# Patient Record
Sex: MALE | Race: WHITE | NOT HISPANIC OR LATINO | Employment: OTHER | URBAN - NONMETROPOLITAN AREA
[De-identification: names, ages, dates, MRNs, and addresses within clinical notes are randomized per-mention and may not be internally consistent; named-entity substitution may affect disease eponyms.]

---

## 2017-01-09 ENCOUNTER — OFFICE VISIT (OUTPATIENT)
Dept: FAMILY MEDICINE CLINIC | Facility: CLINIC | Age: 77
End: 2017-01-09

## 2017-01-09 VITALS
RESPIRATION RATE: 12 BRPM | WEIGHT: 179 LBS | DIASTOLIC BLOOD PRESSURE: 80 MMHG | TEMPERATURE: 98.8 F | SYSTOLIC BLOOD PRESSURE: 130 MMHG | HEIGHT: 63 IN | BODY MASS INDEX: 31.71 KG/M2 | OXYGEN SATURATION: 92 % | HEART RATE: 52 BPM

## 2017-01-09 DIAGNOSIS — E11.42 TYPE 2 DIABETES MELLITUS WITH PERIPHERAL NEUROPATHY (HCC): ICD-10-CM

## 2017-01-09 DIAGNOSIS — Z23 ENCOUNTER FOR IMMUNIZATION: ICD-10-CM

## 2017-01-09 DIAGNOSIS — E78.49 OTHER HYPERLIPIDEMIA: ICD-10-CM

## 2017-01-09 DIAGNOSIS — E53.8 VITAMIN B 12 DEFICIENCY: ICD-10-CM

## 2017-01-09 DIAGNOSIS — N52.01 ERECTILE DYSFUNCTION DUE TO ARTERIAL INSUFFICIENCY: ICD-10-CM

## 2017-01-09 DIAGNOSIS — J44.9 CHRONIC OBSTRUCTIVE PULMONARY DISEASE, UNSPECIFIED COPD TYPE (HCC): ICD-10-CM

## 2017-01-09 DIAGNOSIS — N40.1 BENIGN NON-NODULAR PROSTATIC HYPERPLASIA WITH LOWER URINARY TRACT SYMPTOMS: ICD-10-CM

## 2017-01-09 DIAGNOSIS — E78.2 MIXED HYPERLIPIDEMIA: ICD-10-CM

## 2017-01-09 DIAGNOSIS — K70.0 FATTY LIVER, ALCOHOLIC: ICD-10-CM

## 2017-01-09 DIAGNOSIS — F51.04 CHRONIC INSOMNIA: ICD-10-CM

## 2017-01-09 DIAGNOSIS — I10 ESSENTIAL HYPERTENSION: Primary | ICD-10-CM

## 2017-01-09 DIAGNOSIS — J30.9 CHRONIC ALLERGIC RHINITIS: ICD-10-CM

## 2017-01-09 DIAGNOSIS — Z87.891 FORMER SMOKER: ICD-10-CM

## 2017-01-09 DIAGNOSIS — Z00.00 HEALTHCARE MAINTENANCE: ICD-10-CM

## 2017-01-09 PROCEDURE — G0009 ADMIN PNEUMOCOCCAL VACCINE: HCPCS | Performed by: GENERAL PRACTICE

## 2017-01-09 PROCEDURE — 90670 PCV13 VACCINE IM: CPT | Performed by: GENERAL PRACTICE

## 2017-01-09 PROCEDURE — 99214 OFFICE O/P EST MOD 30 MIN: CPT | Performed by: GENERAL PRACTICE

## 2017-01-09 RX ORDER — HYDROXYZINE PAMOATE 50 MG/1
CAPSULE ORAL
Qty: 120 CAPSULE | Refills: 5 | Status: SHIPPED | OUTPATIENT
Start: 2017-01-09 | End: 2018-03-03 | Stop reason: SDUPTHER

## 2017-01-09 RX ORDER — LISINOPRIL 10 MG/1
10 TABLET ORAL DAILY
Qty: 30 TABLET | Refills: 5 | Status: SHIPPED | OUTPATIENT
Start: 2017-01-09 | End: 2017-12-28 | Stop reason: SDUPTHER

## 2017-01-09 RX ORDER — PRAVASTATIN SODIUM 40 MG
40 TABLET ORAL DAILY
Qty: 30 TABLET | Refills: 5 | Status: SHIPPED | OUTPATIENT
Start: 2017-01-09 | End: 2017-08-27 | Stop reason: SDUPTHER

## 2017-01-09 RX ORDER — LORAZEPAM 1 MG/1
1 TABLET ORAL NIGHTLY PRN
Qty: 30 TABLET | Refills: 3 | Status: SHIPPED | OUTPATIENT
Start: 2017-01-09 | End: 2017-04-11 | Stop reason: SDUPTHER

## 2017-01-09 RX ORDER — TAMSULOSIN HYDROCHLORIDE 0.4 MG/1
1 CAPSULE ORAL NIGHTLY
Qty: 30 CAPSULE | Refills: 5 | Status: SHIPPED | OUTPATIENT
Start: 2017-01-09 | End: 2017-12-28 | Stop reason: SDUPTHER

## 2017-01-09 RX ORDER — METFORMIN HYDROCHLORIDE 500 MG/1
500 TABLET, EXTENDED RELEASE ORAL
Qty: 30 TABLET | Refills: 5 | Status: SHIPPED | OUTPATIENT
Start: 2017-01-09 | End: 2017-07-27 | Stop reason: SDUPTHER

## 2017-01-09 RX ORDER — GABAPENTIN 600 MG/1
600 TABLET ORAL NIGHTLY
Qty: 30 TABLET | Refills: 5 | Status: SHIPPED | OUTPATIENT
Start: 2017-01-09 | End: 2017-07-27 | Stop reason: SDUPTHER

## 2017-01-09 NOTE — MR AVS SNAPSHOT
Joselito Merida   1/9/2017 1:00 PM   Office Visit    Dept Phone:  [phone]   Encounter #:  [id number]    Provider:  Markie Medrano MD   Department:  [facility]                Your Full Care Plan              Where to Get Your Medications      [address] [place], [place] [address] - [phone]  - [phone] [facility] [address]     Phone:  [phone]     cyanocobalamin 1000 MCG tablet    gabapentin 600 MG tablet    hydrOXYzine 50 MG capsule    lisinopril 10 MG tablet    metFORMIN  MG 24 hr tablet    pravastatin 40 MG tablet    RA ALLERGY RELIEF 10 MG tablet    tamsulosin 0.4 MG capsule 24 hr capsule         You can get these medications from any pharmacy     Bring a paper prescription for each of these medications     LORazepam 1 MG tablet            Your Updated Medication List          This list is accurate as of: 1/9/17  2:01 PM.  Always use your most recent med list.                aspirin 81 MG tablet       cyanocobalamin 1000 MCG tablet   Commonly known as:  VITAMIN B-12   Take 1 tablet by mouth Daily.       gabapentin 600 MG tablet   Commonly known as:  NEURONTIN   Take 1 tablet by mouth Every Night.       hydrOXYzine 50 MG capsule   Commonly known as:  VISTARIL   1 po bid, and 2 po qhs       lisinopril 10 MG tablet   Commonly known as:  PRINIVIL,ZESTRIL   Take 1 tablet by mouth Daily.       LORazepam 1 MG tablet   Commonly known as:  ATIVAN   Take 1 tablet by mouth At Night As Needed for anxiety.       metFORMIN  MG 24 hr tablet   Commonly known as:  GLUCOPHAGE-XR   Take 1 tablet by mouth Daily With Breakfast.       pravastatin 40 MG tablet   Commonly known as:  PRAVACHOL   Take 1 tablet by mouth Daily.       RA ALLERGY RELIEF 10 MG tablet   Generic drug:  cetirizine   Take 1 tablet by mouth Daily.       tamsulosin 0.4 MG capsule 24 hr  capsule   Commonly known as:  FLOMAX   Take 1 capsule by mouth Every Night.               We Performed the Following     Pneumococcal Conjugate Vaccine 13-Valent All       You Were Diagnosed With        Codes Comments    Essential hypertension    -  Primary ICD-10-CM: I10  ICD-9-CM: 401.9     Chronic obstructive pulmonary disease, unspecified COPD type     ICD-10-CM: J44.9  ICD-9-CM: 496     Chronic allergic rhinitis     ICD-10-CM: J30.9  ICD-9-CM: 477.9     Vitamin B 12 deficiency     ICD-10-CM: E53.8  ICD-9-CM: 266.2     Fatty liver, alcoholic     ICD-10-CM: K70.0  ICD-9-CM: 571.0     Type 2 diabetes mellitus with peripheral neuropathy     ICD-10-CM: E11.42  ICD-9-CM: 250.60, 357.2     Benign non-nodular prostatic hyperplasia with lower urinary tract symptoms     ICD-10-CM: N40.1  ICD-9-CM: 600.91     Chronic insomnia     ICD-10-CM: F51.04  ICD-9-CM: 780.52     Other hyperlipidemia     ICD-10-CM: E78.4  ICD-9-CM: 272.4     Mixed hyperlipidemia     ICD-10-CM: E78.2  ICD-9-CM: 272.2     Erectile dysfunction due to arterial insufficiency     ICD-10-CM: N52.01  ICD-9-CM: 607.84     Encounter for immunization     ICD-10-CM: Z23  ICD-9-CM: V03.89       Instructions     None    Patient Instructions History      Upcoming Appointments     Visit Type Date Time Department    OFFICE VISIT 1/9/2017  1:00 PM [facility]    LAB 3/24/2017  9:30 AM [facility]    OFFICE VISIT 4/11/2017  1:30 PM [facility]      MyChart Signup     Our records indicate that you have declined [facility] [employer] signup. If you would like to sign up for [employer], please email [email or web address] or call [phone] to obtain an activation code.             Other Info from Your Visit           Your Appointments     Mar 24, 2017  9:30 AM EDT   Lab with LAB [facility] (--)    [address] [place] [address]   [phone]            Apr 11, 2017  1:30 PM EDT  "  Office Visit with Markie Medrano MD   [facility] (--)    [address] [facility] [address] [phone]           Please arrive 10 minutes early. Bring a complete list of all medications and bring any previous records or diagnostic testing results.              Allergies     No Known Drug Allergy        Vital Signs     Pulse Temperature Height Weight Oxygen Saturation Body Mass Index    52 98.8 °F (37.1 °C) (Tympanic) 63\" (160 cm) 179 lb (81.2 kg) 92% 31.71 kg/m2    Smoking Status                   Never Smoker           Problems and Diagnoses Noted     Benign non-nodular prostatic hyperplasia with lower urinary tract symptoms    Chronic allergic rhinitis    Chronic insomnia    Chronic airway obstruction    Encounter for immunization    High blood pressure    Fatty liver, alcoholic    Mixed hyperlipidemia    Type 2 diabetes mellitus with peripheral neuropathy    Vasculogenic erectile dysfunction    Vitamin B12 deficiency    Other hyperlipidemia          No Longer an Issue     Excess wax in ear        "

## 2017-01-10 NOTE — PROGRESS NOTES
Subjective   Joselito Merida is a 76 y.o. male.     History of Present Illness     COPD  The patient reports that the SOB and cough has been gradually decreasing in frequency. He states that the symptoms occur during the day. He reports that his symptoms become worse with activity and exposure to cold air. His symptoms are reduced with rest and with inhaled inhaled medication. The patient states he also has nasal congestion and post nasal drip/clearing throat. He is following the treatment plan, including medications as directed. He has a history of 40 pack years.    Diabetes  Current symptoms include paresthesia of the feet. Patient denies visual disturbances, polydipsia, polyuria, hypoglycemia and foot ulcerations. Evaluation to date has been: hemoglobin A1C. Home sugars: patient does not check sugars. Current treatments: metformin. Last dilated eye exam within one year. Most recent hemoglobin A1c   Lab Results   Component Value Date    HGBA1C 6.90 (H) 09/28/2016    HGBA1C 7.6 (H) 04/14/2016      Dyslipidemia  Compliance with treatment has been fair. The patient exercises intermittently. He is currently being prescribed the following medication for his dyslipidemia - Pravachol (pravastatin). Patient denies side effects associated with his medications. Most recent lipids include  Lab Results   Component Value Date    TRIG 201 (H) 09/28/2016    TRIG 114 04/14/2016    HDL 52 (L) 09/28/2016    HDL 48 (L) 04/14/2016    LDLCALC 90 09/28/2016    LDL 63 04/14/2016     Hypertension  Home blood pressure readings: not doing. Associated signs and symptoms: dyspnea. Patient denies: chest pain, palpitations, orthopnea, paroxysmal nocturnal dyspnea and peripheral edema. Current antihypertensive medications includes lisinopril. Medication compliance: taking as prescribed. Most recent creatinine   Lab Results   Component Value Date    CREATININE 1.09 09/28/2016    CREATININE 1.10 04/14/2016     Labs  Most recent B12 301.  Patient  states he was not taking his B12 supplementation consistently at that time but has done so over the last few months    The following portions of the patient's history were reviewed and updated as appropriate: allergies, current medications, past medical history, past social history and problem list.    Review of Systems   Constitutional: Negative for appetite change, chills, fatigue, fever and unexpected weight change.   HENT: Positive for rhinorrhea and sneezing. Negative for congestion, ear pain, postnasal drip, sinus pressure, sore throat and voice change.    Eyes: Negative for visual disturbance.   Respiratory: Positive for cough and shortness of breath. Negative for wheezing.    Cardiovascular: Negative for chest pain, palpitations and leg swelling.   Gastrointestinal: Negative for abdominal pain, blood in stool, constipation, diarrhea, nausea and vomiting.   Endocrine: Negative for polydipsia and polyuria.   Genitourinary: Negative for difficulty urinating, dysuria, frequency, hematuria and urgency.   Musculoskeletal: Negative for arthralgias, back pain, joint swelling, myalgias and neck pain.   Skin: Negative for color change.   Neurological: Positive for numbness. Negative for tremors, weakness and headaches.   Psychiatric/Behavioral: Positive for sleep disturbance. Negative for dysphoric mood and suicidal ideas. The patient is not nervous/anxious.      Objective   Physical Exam   Constitutional: He is oriented to person, place, and time. He appears well-developed and well-nourished. He is cooperative. He does not have a sickly appearance. No distress.   Well in general.  No apparent distress.  No pallor, cyanosis, diaphoresis, or jaundice   HENT:   Head: Atraumatic.   Mouth/Throat: Oropharynx is clear and moist.   Eyes: EOM are normal. Pupils are equal, round, and reactive to light. No scleral icterus.   Neck: No JVD present. Carotid bruit is not present. No tracheal deviation present. No thyromegaly  present.   Cardiovascular: Normal rate, regular rhythm, S1 normal, S2 normal, normal heart sounds and intact distal pulses.  Exam reveals no gallop.    No murmur heard.  Pulmonary/Chest: No accessory muscle usage. No respiratory distress. He has decreased breath sounds. He has wheezes. He has no rales.   Pulmonary hyperinflation   Abdominal: Soft. Normal aorta and bowel sounds are normal. He exhibits no abdominal bruit and no mass. There is no hepatosplenomegaly. There is no tenderness. No hernia.   Musculoskeletal: He exhibits no tenderness or deformity.   Lymphadenopathy:        Head (right side): No submandibular adenopathy present.        Head (left side): No submandibular adenopathy present.     He has no cervical adenopathy.   Neurological: He is alert and oriented to person, place, and time. He has normal strength and normal reflexes. He displays normal reflexes. A sensory deficit (decreased vibration sense both feet) is present. No cranial nerve deficit. He exhibits normal muscle tone. Coordination normal.   Skin: Skin is warm and dry. No rash noted. He is not diaphoretic. No pallor. Nails show no clubbing.   Psychiatric: He has a normal mood and affect. His behavior is normal.     Assessment/Plan   Problems Addressed this Visit        Cardiovascular and Mediastinum    Essential hypertension - Primary  Hypertension: at goal. Evidence of target organ damage: none.  Encouraged to continue to work on diet and exercise plan.   Continue current medication  Fasting labs will be updated in several months     Relevant Medications    lisinopril (PRINIVIL,ZESTRIL) 10 MG tablet    Other Relevant Orders    CBC & Differential    Comprehensive Metabolic Panel       Respiratory    COPD (chronic obstructive pulmonary disease)  COPD is improving with smoking cessation.  Encouraged to remain off cigarettes  Encouraged to remain as active as symptoms allow for    Relevant Medications    RA ALLERGY RELIEF 10 MG tablet     Chronic allergic rhinitis    Relevant Medications    hydrOXYzine (VISTARIL) 50 MG capsule    RA ALLERGY RELIEF 10 MG tablet       Digestive    Vitamin B 12 deficiency  Encouraged to continue oral supplementation.  We will monitor     Relevant Medications    cyanocobalamin (VITAMIN B-12) 1000 MCG tablet    Other Relevant Orders    Vitamin B12    Fatty liver, alcoholic  Encouraged remain off alcohol        Endocrine    Type 2 diabetes mellitus with peripheral neuropathy  Diabetes mellitus Type II, under excellent control.   Encouraged to continue to pursue ADA diet  Encouraged aerobic exercise.  Reminded of the dangers of metformin if he should resume alcohol  We'll continue to monitor     Relevant Medications    gabapentin (NEURONTIN) 600 MG tablet    metFORMIN XR (GLUCOPHAGE-XR) 500 MG 24 hr tablet    Other Relevant Orders    Hemoglobin A1c    MicroAlbumin, Urine, Random       Genitourinary    Benign non-nodular prostatic hyperplasia with lower urinary tract symptoms    Relevant Medications    tamsulosin (FLOMAX) 0.4 MG capsule 24 hr capsule       Other    Mixed hyperlipidemia  If next LDL is not close to 70 we'll consider replacing pravastatin with a more potent HMG COA inhibitor     Relevant Medications    pravastatin (PRAVACHOL) 40 MG tablet    Other Relevant Orders    Lipid Panel    TSH    Chronic insomnia  Reminded her good sleep habits.  We'll continue current medication     Relevant Medications    LORazepam (ATIVAN) 1 MG tablet    hydrOXYzine (VISTARIL) 50 MG capsule    Vasculogenic erectile dysfunction    Healthcare maintenance  Lung cancer screening discussed briefly however patient quit smoking more than 20 years ago and is not inclined toward this     Encounter for immunization  Recommended a Prevnar     Relevant Orders    Pneumococcal Conjugate Vaccine 13-Valent All (Completed)    Former smoker

## 2017-02-20 ENCOUNTER — OFFICE VISIT (OUTPATIENT)
Dept: FAMILY MEDICINE CLINIC | Facility: CLINIC | Age: 77
End: 2017-02-20

## 2017-02-20 VITALS
OXYGEN SATURATION: 90 % | TEMPERATURE: 98.8 F | HEART RATE: 54 BPM | WEIGHT: 179 LBS | DIASTOLIC BLOOD PRESSURE: 80 MMHG | BODY MASS INDEX: 31.71 KG/M2 | SYSTOLIC BLOOD PRESSURE: 140 MMHG | HEIGHT: 63 IN

## 2017-02-20 DIAGNOSIS — H61.23 BILATERAL IMPACTED CERUMEN: Primary | ICD-10-CM

## 2017-02-20 PROCEDURE — 69210 REMOVE IMPACTED EAR WAX UNI: CPT | Performed by: NURSE PRACTITIONER

## 2017-02-20 PROCEDURE — 99213 OFFICE O/P EST LOW 20 MIN: CPT | Performed by: NURSE PRACTITIONER

## 2017-02-20 NOTE — PROGRESS NOTES
"Guanakito Merida is a 76 y.o. male.     Chief Complaint   Patient presents with   • Ears Cleaned out       History of Present Illness     Ear complaints-excessive wax.  Reports he has them washed out frequently.  He denies any changes in his hearing more than his usual.  He denies any ear pain.      The following portions of the patient's history were reviewed and updated as appropriate: allergies, current medications, past family history, past medical history, past social history, past surgical history and problem list.    Review of Systems   Constitutional: Negative for appetite change, chills, fatigue, fever and unexpected weight change.   HENT: Positive for rhinorrhea and sneezing. Negative for congestion, ear pain, postnasal drip, sinus pressure, sore throat and voice change.    Eyes: Negative for visual disturbance.   Respiratory: Positive for cough and shortness of breath. Negative for wheezing.    Cardiovascular: Negative for chest pain, palpitations and leg swelling.   Gastrointestinal: Negative for abdominal pain, blood in stool, constipation, diarrhea, nausea and vomiting.   Endocrine: Negative for polydipsia and polyuria.   Genitourinary: Negative for difficulty urinating, dysuria, frequency, hematuria and urgency.   Musculoskeletal: Negative for arthralgias, back pain, joint swelling, myalgias and neck pain.   Skin: Negative for color change.   Neurological: Positive for numbness. Negative for tremors, weakness and headaches.   Psychiatric/Behavioral: Positive for sleep disturbance. Negative for dysphoric mood and suicidal ideas. The patient is not nervous/anxious.    All other systems reviewed and are negative.      Objective     Visit Vitals   • /80 (BP Location: Left arm, Patient Position: Sitting)   • Pulse 54   • Temp 98.8 °F (37.1 °C) (Tympanic)   • Ht 63\" (160 cm)   • Wt 179 lb (81.2 kg)   • SpO2 90%   • BMI 31.71 kg/m2       Physical Exam   Constitutional: He is oriented to person, " place, and time. He appears well-developed and well-nourished. He is cooperative. He does not have a sickly appearance. No distress.   Well in general.  No apparent distress.  No pallor, cyanosis, diaphoresis, or jaundice   HENT:   Head: Atraumatic.   Right Ear: Decreased hearing is noted.   Left Ear: Decreased hearing is noted.   Mouth/Throat: Oropharynx is clear and moist.   Bilateral Cerumen impaction.  Left ear cleared with copious amounts of warm water and flexi-loop.  LEft TM dull, retracted.  Right ear flushed with copious amounts of warm water.  Small amount removed with flexi-loop but unable to clear EAC.  Patient tolerated well.     Eyes: EOM are normal. Pupils are equal, round, and reactive to light. No scleral icterus.   Neck: No JVD present. Carotid bruit is not present. No tracheal deviation present. No thyromegaly present.   Cardiovascular: Normal rate, regular rhythm, S1 normal, S2 normal, normal heart sounds and intact distal pulses.  Exam reveals no gallop.    No murmur heard.  Pulmonary/Chest: No accessory muscle usage. No respiratory distress. He has decreased breath sounds. He has no wheezes. He has no rales.   Pulmonary hyperinflation   Abdominal: Soft. Normal aorta and bowel sounds are normal. He exhibits no abdominal bruit and no mass. There is no hepatosplenomegaly. There is no tenderness. No hernia.   Musculoskeletal: He exhibits no tenderness or deformity.   Lymphadenopathy:        Head (right side): No submandibular adenopathy present.        Head (left side): No submandibular adenopathy present.     He has no cervical adenopathy.   Neurological: He is alert and oriented to person, place, and time. He has normal strength and normal reflexes. A sensory deficit (decreased vibration sense both feet) is present. No cranial nerve deficit. Coordination normal.   Skin: Skin is warm and dry. No rash noted. He is not diaphoretic. No pallor. Nails show no clubbing.   Psychiatric: He has a normal  mood and affect. His behavior is normal.   Vitals reviewed.      Assessment/Plan     Joselito was seen today for ears cleaned out.    Diagnoses and all orders for this visit:    Bilateral impacted cerumen    Advised to have ears cleaned more frequently.    Do not stick objects into ears.   May use sweet oil drops 3-4 days prior to planned appt to soften wax.   Understands disease processes and need for medications.  Understands reasons for urgent and emergent care.  Patient (& family) verbalized agreement for treatment plan.   RTC PRN 3-5 days for worsening or non resolving symptoms  Patient declines to make an appt today for return back to the office.

## 2017-03-24 ENCOUNTER — LAB (OUTPATIENT)
Dept: FAMILY MEDICINE CLINIC | Facility: CLINIC | Age: 77
End: 2017-03-24

## 2017-03-24 DIAGNOSIS — E78.2 MIXED HYPERLIPIDEMIA: ICD-10-CM

## 2017-03-24 DIAGNOSIS — I10 ESSENTIAL HYPERTENSION: ICD-10-CM

## 2017-03-24 DIAGNOSIS — E11.42 TYPE 2 DIABETES MELLITUS WITH PERIPHERAL NEUROPATHY (HCC): ICD-10-CM

## 2017-03-24 DIAGNOSIS — E53.8 VITAMIN B 12 DEFICIENCY: ICD-10-CM

## 2017-03-24 LAB
ALBUMIN SERPL-MCNC: 4.3 G/DL (ref 3.4–4.8)
ALBUMIN UR-MCNC: 76.3 MG/L
ALBUMIN/GLOB SERPL: 1.4 G/DL (ref 1.5–2.5)
ALP SERPL-CCNC: 38 U/L (ref 40–129)
ALT SERPL W P-5'-P-CCNC: 50 U/L (ref 10–44)
ANION GAP SERPL CALCULATED.3IONS-SCNC: 5.4 MMOL/L (ref 3.6–11.2)
AST SERPL-CCNC: 43 U/L (ref 10–34)
BASOPHILS # BLD AUTO: 0.03 10*3/MM3 (ref 0–0.3)
BASOPHILS NFR BLD AUTO: 0.5 % (ref 0–2)
BILIRUB SERPL-MCNC: 0.8 MG/DL (ref 0.2–1.8)
BUN BLD-MCNC: 20 MG/DL (ref 7–21)
BUN/CREAT SERPL: 19.4 (ref 7–25)
CALCIUM SPEC-SCNC: 9.7 MG/DL (ref 7.7–10)
CHLORIDE SERPL-SCNC: 105 MMOL/L (ref 99–112)
CHOLEST SERPL-MCNC: 156 MG/DL (ref 0–200)
CO2 SERPL-SCNC: 28.6 MMOL/L (ref 24.3–31.9)
CREAT BLD-MCNC: 1.03 MG/DL (ref 0.43–1.29)
DEPRECATED RDW RBC AUTO: 42.2 FL (ref 37–54)
EOSINOPHIL # BLD AUTO: 0.37 10*3/MM3 (ref 0–0.7)
EOSINOPHIL NFR BLD AUTO: 5.6 % (ref 0–7)
ERYTHROCYTE [DISTWIDTH] IN BLOOD BY AUTOMATED COUNT: 13.7 % (ref 11.5–14.5)
GFR SERPL CREATININE-BSD FRML MDRD: 71 ML/MIN/1.73
GLOBULIN UR ELPH-MCNC: 3.1 GM/DL
GLUCOSE BLD-MCNC: 146 MG/DL (ref 70–110)
HBA1C MFR BLD: 7.2 % (ref 4.5–5.7)
HCT VFR BLD AUTO: 46.8 % (ref 42–52)
HDLC SERPL-MCNC: 52 MG/DL (ref 60–100)
HGB BLD-MCNC: 15.7 G/DL (ref 14–18)
IMM GRANULOCYTES # BLD: 0.02 10*3/MM3 (ref 0–0.03)
IMM GRANULOCYTES NFR BLD: 0.3 % (ref 0–0.5)
LDLC SERPL CALC-MCNC: 64 MG/DL (ref 0–100)
LDLC/HDLC SERPL: 1.22 {RATIO}
LYMPHOCYTES # BLD AUTO: 2.24 10*3/MM3 (ref 1–3)
LYMPHOCYTES NFR BLD AUTO: 33.7 % (ref 16–46)
MCH RBC QN AUTO: 28.3 PG (ref 27–33)
MCHC RBC AUTO-ENTMCNC: 33.5 G/DL (ref 33–37)
MCV RBC AUTO: 84.5 FL (ref 80–94)
MONOCYTES # BLD AUTO: 0.57 10*3/MM3 (ref 0.1–0.9)
MONOCYTES NFR BLD AUTO: 8.6 % (ref 0–12)
NEUTROPHILS # BLD AUTO: 3.42 10*3/MM3 (ref 1.4–6.5)
NEUTROPHILS NFR BLD AUTO: 51.3 % (ref 40–75)
OSMOLALITY SERPL CALC.SUM OF ELEC: 282.8 MOSM/KG (ref 273–305)
PLATELET # BLD AUTO: 183 10*3/MM3 (ref 130–400)
PMV BLD AUTO: 10.2 FL (ref 6–10)
POTASSIUM BLD-SCNC: 4.4 MMOL/L (ref 3.5–5.3)
PROT SERPL-MCNC: 7.4 G/DL (ref 6–8)
RBC # BLD AUTO: 5.54 10*6/MM3 (ref 4.7–6.1)
SODIUM BLD-SCNC: 139 MMOL/L (ref 135–153)
TRIGL SERPL-MCNC: 202 MG/DL (ref 0–150)
TSH SERPL DL<=0.05 MIU/L-ACNC: 3.05 MIU/ML (ref 0.55–4.78)
VIT B12 BLD-MCNC: 631 PG/ML (ref 211–911)
VLDLC SERPL-MCNC: 40.4 MG/DL
WBC NRBC COR # BLD: 6.65 10*3/MM3 (ref 4.5–12.5)

## 2017-03-24 PROCEDURE — 83036 HEMOGLOBIN GLYCOSYLATED A1C: CPT | Performed by: GENERAL PRACTICE

## 2017-03-24 PROCEDURE — 80053 COMPREHEN METABOLIC PANEL: CPT | Performed by: GENERAL PRACTICE

## 2017-03-24 PROCEDURE — 80061 LIPID PANEL: CPT | Performed by: GENERAL PRACTICE

## 2017-03-24 PROCEDURE — 36415 COLL VENOUS BLD VENIPUNCTURE: CPT

## 2017-03-24 PROCEDURE — 85025 COMPLETE CBC W/AUTO DIFF WBC: CPT | Performed by: GENERAL PRACTICE

## 2017-03-24 PROCEDURE — 82607 VITAMIN B-12: CPT | Performed by: GENERAL PRACTICE

## 2017-03-24 PROCEDURE — 84443 ASSAY THYROID STIM HORMONE: CPT | Performed by: GENERAL PRACTICE

## 2017-03-24 PROCEDURE — 82043 UR ALBUMIN QUANTITATIVE: CPT | Performed by: GENERAL PRACTICE

## 2017-04-11 ENCOUNTER — OFFICE VISIT (OUTPATIENT)
Dept: FAMILY MEDICINE CLINIC | Facility: CLINIC | Age: 77
End: 2017-04-11

## 2017-04-11 DIAGNOSIS — Z87.891 FORMER SMOKER: ICD-10-CM

## 2017-04-11 DIAGNOSIS — E78.2 MIXED HYPERLIPIDEMIA: ICD-10-CM

## 2017-04-11 DIAGNOSIS — E11.42 TYPE 2 DIABETES MELLITUS WITH PERIPHERAL NEUROPATHY (HCC): ICD-10-CM

## 2017-04-11 DIAGNOSIS — E53.8 VITAMIN B 12 DEFICIENCY: ICD-10-CM

## 2017-04-11 DIAGNOSIS — K70.0 FATTY LIVER, ALCOHOLIC: ICD-10-CM

## 2017-04-11 DIAGNOSIS — I10 ESSENTIAL HYPERTENSION: Primary | ICD-10-CM

## 2017-04-11 DIAGNOSIS — J30.9 CHRONIC ALLERGIC RHINITIS: ICD-10-CM

## 2017-04-11 DIAGNOSIS — N40.1 BENIGN NON-NODULAR PROSTATIC HYPERPLASIA WITH LOWER URINARY TRACT SYMPTOMS: ICD-10-CM

## 2017-04-11 DIAGNOSIS — F51.04 CHRONIC INSOMNIA: ICD-10-CM

## 2017-04-11 DIAGNOSIS — N52.01 ERECTILE DYSFUNCTION DUE TO ARTERIAL INSUFFICIENCY: ICD-10-CM

## 2017-04-11 DIAGNOSIS — J44.9 CHRONIC OBSTRUCTIVE PULMONARY DISEASE, UNSPECIFIED COPD TYPE (HCC): ICD-10-CM

## 2017-04-11 PROCEDURE — 99214 OFFICE O/P EST MOD 30 MIN: CPT | Performed by: GENERAL PRACTICE

## 2017-04-11 RX ORDER — LORAZEPAM 1 MG/1
1 TABLET ORAL NIGHTLY PRN
Qty: 30 TABLET | Refills: 3 | Status: SHIPPED | OUTPATIENT
Start: 2017-04-11 | End: 2017-08-11 | Stop reason: SDUPTHER

## 2017-04-11 NOTE — PROGRESS NOTES
Subjective   Joselito Merida is a 76 y.o. male.     History of Present Illness     Allergic Rhinitis  Patient has a history of allergic rhinitis. Patient's symptoms include sneezing, clear rhinorrhea and postnasal drip. These symptoms are perennial with seasonal exacerbation. The patient has been suffering from these symptoms for a number of years . The patient has tried prescription antihistamine - loratadine with good relief of symptoms.      COPD  The patient reports that his SOB and cough have been gradually decreasing in frequency. He states that the symptoms occur during the day. He reports that his symptoms become worse with activity and exposure to cold air. His symptoms are reduced with rest and with inhaled inhaled medication. The patient states he also has nasal congestion and post nasal drip/clearing throat. He is following the treatment plan, including medications as directed. He has a history of 40 pack years.    Diabetes  Current symptoms include paresthesia of the feet. Patient denies visual disturbances, polydipsia, polyuria, hypoglycemia and foot ulcerations. Evaluation to date has been: hemoglobin A1C. Home sugars: patient does not check sugars. Current treatments: metformin. Last dilated eye exam within one year. Most recent hemoglobin A1c   Lab Results   Component Value Date    HGBA1C 7.20 (H) 03/24/2017    HGBA1C 6.90 (H) 09/28/2016    HGBA1C 7.6 (H) 04/14/2016      Dyslipidemia  Compliance with treatment has been fair. The patient exercises intermittently. He is currently being prescribed the following medication for his dyslipidemia - pravastatin. Patient denies side effects associated with his medications. Most recent lipids include  Lab Results   Component Value Date    TRIG 202 (H) 03/24/2017    TRIG 201 (H) 09/28/2016    HDL 52 (L) 03/24/2017    HDL 52 (L) 09/28/2016    LDLCALC 64 03/24/2017    LDLCALC 90 09/28/2016    LDL 63 04/14/2016     Hypertension  Home blood pressure readings: not  doing. Associated signs and symptoms: dyspnea. Patient denies: chest pain, palpitations, orthopnea, paroxysmal nocturnal dyspnea and peripheral edema. Current antihypertensive medications includes lisinopril. Medication compliance: taking as prescribed. Most recent creatinine   Lab Results   Component Value Date    CREATININE 1.03 03/24/2017     Labs  Most recent AST 43 and ALT 50. B12 631    The following portions of the patient's history were reviewed and updated as appropriate: allergies, current medications, past medical history, past social history and problem list.    Review of Systems   Constitutional: Negative for appetite change, chills, fatigue, fever and unexpected weight change.   HENT: Positive for postnasal drip, rhinorrhea and sneezing. Negative for congestion, ear pain, sinus pressure, sore throat and voice change.    Eyes: Negative for visual disturbance.   Respiratory: Positive for cough and shortness of breath. Negative for wheezing.    Cardiovascular: Negative for chest pain, palpitations and leg swelling.   Gastrointestinal: Negative for abdominal pain, blood in stool, constipation, diarrhea, nausea and vomiting.   Endocrine: Negative for polydipsia and polyuria.   Genitourinary: Negative for difficulty urinating, dysuria, frequency, hematuria and urgency.   Musculoskeletal: Negative for arthralgias, back pain, joint swelling, myalgias and neck pain.   Skin: Negative for color change.   Neurological: Positive for numbness. Negative for tremors, weakness and headaches.   Psychiatric/Behavioral: Positive for sleep disturbance. Negative for dysphoric mood and suicidal ideas. The patient is not nervous/anxious.      Objective   Physical Exam   Constitutional: He is oriented to person, place, and time. He appears well-developed and well-nourished. He is cooperative. He does not have a sickly appearance. No distress.   Well in general.  No apparent distress.  No pallor, cyanosis, diaphoresis, or  jaundice   HENT:   Head: Atraumatic.   Mouth/Throat: Oropharynx is clear and moist.   Eyes: EOM are normal. Pupils are equal, round, and reactive to light. No scleral icterus.   Neck: No JVD present. Carotid bruit is not present. No tracheal deviation present. No thyromegaly present.   Cardiovascular: Normal rate, regular rhythm, S1 normal, S2 normal, normal heart sounds and intact distal pulses.  Exam reveals no gallop.    No murmur heard.  Pulmonary/Chest: No accessory muscle usage. No respiratory distress. He has decreased breath sounds. He has wheezes. He has no rales.   Pulmonary hyperinflation   Abdominal: Soft. Normal aorta and bowel sounds are normal. He exhibits no abdominal bruit and no mass. There is no hepatosplenomegaly. There is no tenderness. No hernia.   Musculoskeletal: He exhibits no tenderness or deformity.   Lymphadenopathy:        Head (right side): No submandibular adenopathy present.        Head (left side): No submandibular adenopathy present.     He has no cervical adenopathy.   Neurological: He is alert and oriented to person, place, and time. He has normal strength and normal reflexes. He displays normal reflexes. A sensory deficit (decreased vibration sense both feet) is present. No cranial nerve deficit. He exhibits normal muscle tone. Coordination normal.   Skin: Skin is warm and dry. No rash noted. He is not diaphoretic. No pallor. Nails show no clubbing.   Psychiatric: He has a normal mood and affect. His behavior is normal.     Assessment/Plan   Problems Addressed this Visit        Cardiovascular and Mediastinum    Essential hypertension   Hypertension: elevated today. Evidence of target organ damage: none.  Encouraged to continue to work on diet and exercise plan.   Reminded of the importance of salt avoidance.  Continue current medication for now - if elevated at return will recommend a titration of his lisinopril    Mixed hyperlipidemia  As above. Continue current medication.        Respiratory    COPD (chronic obstructive pulmonary disease)  COPD is improving with smoking cessation.  Encouraged to remain off cigarettes  Encouraged to remain as active as symptoms allow for    Chronic allergic rhinitis  Continue current treatment. Reminded regarding allergen avoidance.       Digestive    Vitamin B 12 deficiency  Corrected  Continue supplementation    Fatty liver, alcoholic       Endocrine    Type 2 diabetes mellitus with peripheral neuropathy  Diabetes mellitus Type II, under fair control.   Encouraged to continue to pursue ADA diet  Encouraged aerobic exercise.  Updated labs will be arranged at his return       Genitourinary    Benign non-nodular prostatic hyperplasia with lower urinary tract symptoms       Other    Chronic insomnia    Relevant Medications    LORazepam (ATIVAN) 1 MG tablet    Vasculogenic erectile dysfunction    Former smoker

## 2017-04-12 VITALS
HEIGHT: 63 IN | SYSTOLIC BLOOD PRESSURE: 160 MMHG | WEIGHT: 177 LBS | DIASTOLIC BLOOD PRESSURE: 70 MMHG | BODY MASS INDEX: 31.36 KG/M2 | HEART RATE: 56 BPM | TEMPERATURE: 98.2 F | OXYGEN SATURATION: 95 % | RESPIRATION RATE: 12 BRPM

## 2017-07-27 DIAGNOSIS — E11.42 TYPE 2 DIABETES MELLITUS WITH PERIPHERAL NEUROPATHY (HCC): ICD-10-CM

## 2017-07-27 RX ORDER — METFORMIN HYDROCHLORIDE 500 MG/1
TABLET, EXTENDED RELEASE ORAL
Qty: 30 TABLET | Refills: 5 | Status: SHIPPED | OUTPATIENT
Start: 2017-07-27 | End: 2018-01-29 | Stop reason: SDUPTHER

## 2017-07-27 RX ORDER — GABAPENTIN 600 MG/1
TABLET ORAL
Qty: 30 TABLET | Refills: 0 | Status: SHIPPED | OUTPATIENT
Start: 2017-07-27 | End: 2017-07-31 | Stop reason: SDUPTHER

## 2017-07-31 DIAGNOSIS — E11.42 TYPE 2 DIABETES MELLITUS WITH PERIPHERAL NEUROPATHY (HCC): ICD-10-CM

## 2017-07-31 RX ORDER — GABAPENTIN 600 MG/1
600 TABLET ORAL DAILY
Qty: 30 TABLET | Refills: 0 | Status: SHIPPED | OUTPATIENT
Start: 2017-07-31 | End: 2017-08-11 | Stop reason: SDUPTHER

## 2017-08-11 ENCOUNTER — OFFICE VISIT (OUTPATIENT)
Dept: FAMILY MEDICINE CLINIC | Facility: CLINIC | Age: 77
End: 2017-08-11

## 2017-08-11 DIAGNOSIS — E53.8 VITAMIN B 12 DEFICIENCY: ICD-10-CM

## 2017-08-11 DIAGNOSIS — E78.2 MIXED HYPERLIPIDEMIA: ICD-10-CM

## 2017-08-11 DIAGNOSIS — N52.01 ERECTILE DYSFUNCTION DUE TO ARTERIAL INSUFFICIENCY: ICD-10-CM

## 2017-08-11 DIAGNOSIS — I10 ESSENTIAL HYPERTENSION: Primary | ICD-10-CM

## 2017-08-11 DIAGNOSIS — N40.1 BENIGN NON-NODULAR PROSTATIC HYPERPLASIA WITH LOWER URINARY TRACT SYMPTOMS: ICD-10-CM

## 2017-08-11 DIAGNOSIS — J44.9 CHRONIC OBSTRUCTIVE PULMONARY DISEASE, UNSPECIFIED COPD TYPE (HCC): ICD-10-CM

## 2017-08-11 DIAGNOSIS — E11.42 TYPE 2 DIABETES MELLITUS WITH PERIPHERAL NEUROPATHY (HCC): ICD-10-CM

## 2017-08-11 DIAGNOSIS — J30.9 CHRONIC ALLERGIC RHINITIS: ICD-10-CM

## 2017-08-11 DIAGNOSIS — Z00.00 HEALTHCARE MAINTENANCE: ICD-10-CM

## 2017-08-11 DIAGNOSIS — F51.04 CHRONIC INSOMNIA: ICD-10-CM

## 2017-08-11 DIAGNOSIS — K70.0 FATTY LIVER, ALCOHOLIC: ICD-10-CM

## 2017-08-11 DIAGNOSIS — Z87.891 FORMER SMOKER: ICD-10-CM

## 2017-08-11 PROCEDURE — 85025 COMPLETE CBC W/AUTO DIFF WBC: CPT | Performed by: GENERAL PRACTICE

## 2017-08-11 PROCEDURE — 82607 VITAMIN B-12: CPT | Performed by: GENERAL PRACTICE

## 2017-08-11 PROCEDURE — 80053 COMPREHEN METABOLIC PANEL: CPT | Performed by: GENERAL PRACTICE

## 2017-08-11 PROCEDURE — 99214 OFFICE O/P EST MOD 30 MIN: CPT | Performed by: GENERAL PRACTICE

## 2017-08-11 PROCEDURE — 36415 COLL VENOUS BLD VENIPUNCTURE: CPT | Performed by: GENERAL PRACTICE

## 2017-08-11 PROCEDURE — 82043 UR ALBUMIN QUANTITATIVE: CPT | Performed by: GENERAL PRACTICE

## 2017-08-11 PROCEDURE — 80061 LIPID PANEL: CPT | Performed by: GENERAL PRACTICE

## 2017-08-11 PROCEDURE — 83036 HEMOGLOBIN GLYCOSYLATED A1C: CPT | Performed by: GENERAL PRACTICE

## 2017-08-11 PROCEDURE — 84443 ASSAY THYROID STIM HORMONE: CPT | Performed by: GENERAL PRACTICE

## 2017-08-11 RX ORDER — LORAZEPAM 1 MG/1
1 TABLET ORAL NIGHTLY PRN
Qty: 30 TABLET | Refills: 3 | Status: SHIPPED | OUTPATIENT
Start: 2017-08-11 | End: 2017-12-18 | Stop reason: SDUPTHER

## 2017-08-11 RX ORDER — GABAPENTIN 600 MG/1
600 TABLET ORAL DAILY
Qty: 30 TABLET | Refills: 3 | Status: SHIPPED | OUTPATIENT
Start: 2017-08-11 | End: 2018-01-02 | Stop reason: SDUPTHER

## 2017-08-11 NOTE — PROGRESS NOTES
Subjective   Joselito Merida is a 76 y.o. male.     History of Present Illness     Allergic Rhinitis  Patient has a history of allergic rhinitis. Patient's symptoms include sneezing, clear rhinorrhea and postnasal drip. These symptoms are perennial with seasonal exacerbation. The patient has been suffering from these symptoms for a number of years . The patient has tried prescription antihistamine - loratadine with good relief of symptoms.      COPD  The patient reports that his SOB and cough have remained stable since last here. He states that the symptoms occur during the day. He reports that his symptoms become worse with activity and exposure to cold air. His symptoms are reduced with rest and with inhaled inhaled medication. The patient states he also has nasal congestion and post nasal drip/clearing throat. He states he is able to walk on level ground with no symptoms but has trouble with inclines, stairs, or lifting. He is following the treatment plan, including medications as directed. He has a history of 40 pack years.    Diabetes  Current symptoms include paresthesia of the feet. Patient denies visual disturbances, polydipsia, polyuria, hypoglycemia and foot ulcerations. Evaluation to date has been: hemoglobin A1C. Home sugars: patient does not check sugars. Current treatments: metformin. Last dilated eye exam within one year. He has had no recent labs     Dyslipidemia  Compliance with treatment has been fair. The patient exercises intermittently. He is currently being prescribed the following medication for his dyslipidemia - pravastatin. Patient denies side effects associated with his medications.    Hypertension  Home blood pressure readings: not doing. Associated signs and symptoms: dyspnea. Patient denies: chest pain, palpitations, orthopnea, paroxysmal nocturnal dyspnea and peripheral edema. Current antihypertensive medications includes lisinopril. Medication compliance: taking as prescribed.     The  following portions of the patient's history were reviewed and updated as appropriate: allergies, current medications, past medical history, past social history and problem list.    Review of Systems   Constitutional: Negative for appetite change, chills, fatigue, fever and unexpected weight change.   HENT: Positive for postnasal drip, rhinorrhea and sneezing. Negative for congestion, ear pain, sinus pressure, sore throat and voice change.    Eyes: Negative for visual disturbance.   Respiratory: Positive for cough and shortness of breath. Negative for wheezing.    Cardiovascular: Negative for chest pain, palpitations and leg swelling.   Gastrointestinal: Negative for abdominal pain, blood in stool, constipation, diarrhea, nausea and vomiting.   Endocrine: Negative for polydipsia and polyuria.   Genitourinary: Negative for difficulty urinating, dysuria, frequency, hematuria and urgency.   Musculoskeletal: Negative for arthralgias, back pain, joint swelling, myalgias and neck pain.   Skin: Negative for color change.   Neurological: Positive for numbness. Negative for tremors, weakness and headaches.   Psychiatric/Behavioral: Positive for sleep disturbance. Negative for dysphoric mood and suicidal ideas. The patient is not nervous/anxious.      Objective   Physical Exam   Constitutional: He is oriented to person, place, and time. He appears well-developed and well-nourished. He is cooperative. He does not have a sickly appearance. No distress.   Well in general.  No apparent distress.  No pallor, cyanosis, diaphoresis, or jaundice   HENT:   Head: Atraumatic.   Mouth/Throat: Oropharynx is clear and moist.   Eyes: EOM are normal. Pupils are equal, round, and reactive to light. No scleral icterus.   Neck: No JVD present. Carotid bruit is not present. No tracheal deviation present. No thyromegaly present.   Cardiovascular: Normal rate, regular rhythm, S1 normal, S2 normal, normal heart sounds and intact distal pulses.  Exam  reveals no gallop.    No murmur heard.  Pulmonary/Chest: No accessory muscle usage. No respiratory distress. He has decreased breath sounds. He has wheezes. He has no rales.   Pulmonary hyperinflation   Abdominal: Soft. Normal aorta and bowel sounds are normal. He exhibits no abdominal bruit and no mass. There is no hepatosplenomegaly. There is no tenderness. No hernia.   Musculoskeletal: He exhibits no tenderness or deformity.    Joselito had a diabetic foot exam performed today.  Lymphadenopathy:        Head (right side): No submandibular adenopathy present.        Head (left side): No submandibular adenopathy present.     He has no cervical adenopathy.   Neurological: He is alert and oriented to person, place, and time. He has normal strength and normal reflexes. He displays normal reflexes. A sensory deficit (decreased vibration sense both feet) is present. No cranial nerve deficit. He exhibits normal muscle tone. Coordination normal.   Skin: Skin is warm and dry. No rash noted. He is not diaphoretic. No pallor. Nails show no clubbing.   Psychiatric: He has a normal mood and affect. His behavior is normal.     Assessment/Plan   Problems Addressed this Visit        Cardiovascular and Mediastinum    Essential hypertension   Encouraged to continue to work on his diet and exercise plan.  Continue current medication  Fasting labs scheduled    Relevant Orders    CBC & Differential    Comprehensive Metabolic Panel    CBC Auto Differential    Mixed hyperlipidemia  As above. Continue current medication.    Relevant Orders    Lipid Panel    TSH       Respiratory    COPD (chronic obstructive pulmonary disease)  COPD is improving with smoking cessation.  Encouraged to remain off cigarettes  Encouraged to remain as active as symptoms allow for    Chronic allergic rhinitis       Digestive    Vitamin B 12 deficiency  Will continue to monitor    Relevant Orders    Vitamin B12    Fatty liver, alcoholic       Endocrine    Type 2  diabetes mellitus with peripheral neuropathy  Diabetes mellitus Type II, under fair control.   Encouraged to continue to pursue ADA diet  Encouraged aerobic exercise.    Relevant Medications    gabapentin (NEURONTIN) 600 MG tablet    Other Relevant Orders    Hemoglobin A1c    MicroAlbumin, Urine, Random       Genitourinary    Benign non-nodular prostatic hyperplasia with lower urinary tract symptoms       Other    Chronic insomnia    Relevant Medications    LORazepam (ATIVAN) 1 MG tablet    Vasculogenic erectile dysfunction    Healthcare maintenance  Reminded to get a flu shot when available  Will discuss a Tdap and zostavax at his return    Former smoker

## 2017-08-12 VITALS
HEART RATE: 50 BPM | DIASTOLIC BLOOD PRESSURE: 70 MMHG | SYSTOLIC BLOOD PRESSURE: 120 MMHG | TEMPERATURE: 98.1 F | BODY MASS INDEX: 30.83 KG/M2 | HEIGHT: 63 IN | RESPIRATION RATE: 12 BRPM | OXYGEN SATURATION: 92 % | WEIGHT: 174 LBS

## 2017-08-13 LAB
ALBUMIN SERPL-MCNC: 4.5 G/DL (ref 3.5–4.8)
ALBUMIN/GLOB SERPL: 1.6 {RATIO} (ref 1.2–2.2)
ALP SERPL-CCNC: 45 IU/L (ref 39–117)
ALT SERPL-CCNC: 60 IU/L (ref 0–44)
AST SERPL-CCNC: 59 IU/L (ref 0–40)
BASOPHILS # BLD AUTO: 0 X10E3/UL (ref 0–0.2)
BASOPHILS NFR BLD AUTO: 1 %
BILIRUB SERPL-MCNC: 0.4 MG/DL (ref 0–1.2)
BUN SERPL-MCNC: 20 MG/DL (ref 8–27)
BUN/CREAT SERPL: 19 (ref 10–24)
CALCIUM SERPL-MCNC: 9.6 MG/DL (ref 8.6–10.2)
CHLORIDE SERPL-SCNC: 99 MMOL/L (ref 96–106)
CHOLEST SERPL-MCNC: 168 MG/DL (ref 100–199)
CO2 SERPL-SCNC: 19 MMOL/L (ref 18–29)
CREAT SERPL-MCNC: 1.04 MG/DL (ref 0.76–1.27)
EOSINOPHIL # BLD AUTO: 1 X10E3/UL (ref 0–0.4)
EOSINOPHIL # BLD AUTO: 12 %
ERYTHROCYTE [DISTWIDTH] IN BLOOD BY AUTOMATED COUNT: 14.2 % (ref 12.3–15.4)
GLOBULIN SER CALC-MCNC: 2.8 G/DL (ref 1.5–4.5)
GLUCOSE SERPL-MCNC: 107 MG/DL (ref 65–99)
HBA1C MFR BLD: 6.6 % (ref 4.8–5.6)
HCT VFR BLD AUTO: 45.9 % (ref 37.5–51)
HDLC SERPL-MCNC: 57 MG/DL
HGB BLD-MCNC: 15.5 G/DL (ref 12.6–17.7)
IMM GRANULOCYTES # BLD: 0 X10E3/UL (ref 0–0.1)
IMM GRANULOCYTES NFR BLD: 0 %
LDLC SERPL CALC-MCNC: 66 MG/DL (ref 0–99)
LYMPHOCYTES # BLD AUTO: 2.2 X10E3/UL (ref 0.7–3.1)
LYMPHOCYTES NFR BLD AUTO: 26 %
MCH RBC QN AUTO: 30.3 PG (ref 26.6–33)
MCHC RBC AUTO-ENTMCNC: 33.8 G/DL (ref 31.5–35.7)
MCV RBC AUTO: 90 FL (ref 79–97)
MICROALBUMIN UR-MCNC: 13.5 UG/ML
MONOCYTES # BLD AUTO: 0.5 X10E3/UL (ref 0.1–0.9)
MONOCYTES NFR BLD AUTO: 6 %
NEUTROPHILS # BLD AUTO: 4.7 X10E3/UL (ref 1.4–7)
NEUTROPHILS NFR BLD AUTO: 55 %
PLATELET # BLD AUTO: 181 X10E3/UL (ref 150–379)
POTASSIUM SERPL-SCNC: 5.1 MMOL/L (ref 3.5–5.2)
PROT SERPL-MCNC: 7.3 G/DL (ref 6–8.5)
RBC # BLD AUTO: 5.12 X10E6/UL (ref 4.14–5.8)
SODIUM SERPL-SCNC: 141 MMOL/L (ref 134–144)
TRIGL SERPL-MCNC: 223 MG/DL (ref 0–149)
TSH SERPL-ACNC: 2.58 UIU/ML (ref 0.45–4.5)
VIT B12 SERPL-MCNC: 671 PG/ML (ref 211–946)
VLDLC SERPL-MCNC: 45 MG/DL (ref 5–40)
WBC # BLD AUTO: 8.5 X10E3/UL (ref 3.4–10.8)

## 2017-08-27 DIAGNOSIS — E78.49 OTHER HYPERLIPIDEMIA: ICD-10-CM

## 2017-08-28 RX ORDER — PRAVASTATIN SODIUM 40 MG
TABLET ORAL
Qty: 30 TABLET | Refills: 5 | Status: SHIPPED | OUTPATIENT
Start: 2017-08-28 | End: 2018-03-01 | Stop reason: SDUPTHER

## 2017-12-18 ENCOUNTER — OFFICE VISIT (OUTPATIENT)
Dept: FAMILY MEDICINE CLINIC | Facility: CLINIC | Age: 77
End: 2017-12-18

## 2017-12-18 VITALS
HEART RATE: 54 BPM | RESPIRATION RATE: 12 BRPM | DIASTOLIC BLOOD PRESSURE: 75 MMHG | HEIGHT: 63 IN | WEIGHT: 176 LBS | TEMPERATURE: 98.8 F | OXYGEN SATURATION: 92 % | SYSTOLIC BLOOD PRESSURE: 120 MMHG | BODY MASS INDEX: 31.18 KG/M2

## 2017-12-18 DIAGNOSIS — H61.92 SKIN LESION OF LEFT EAR: ICD-10-CM

## 2017-12-18 DIAGNOSIS — J30.2 CHRONIC SEASONAL ALLERGIC RHINITIS, UNSPECIFIED TRIGGER: ICD-10-CM

## 2017-12-18 DIAGNOSIS — J44.9 CHRONIC OBSTRUCTIVE PULMONARY DISEASE, UNSPECIFIED COPD TYPE (HCC): ICD-10-CM

## 2017-12-18 DIAGNOSIS — N40.1 BENIGN NON-NODULAR PROSTATIC HYPERPLASIA WITH LOWER URINARY TRACT SYMPTOMS: ICD-10-CM

## 2017-12-18 DIAGNOSIS — E78.2 MIXED HYPERLIPIDEMIA: ICD-10-CM

## 2017-12-18 DIAGNOSIS — Z87.891 FORMER SMOKER: ICD-10-CM

## 2017-12-18 DIAGNOSIS — N52.01 ERECTILE DYSFUNCTION DUE TO ARTERIAL INSUFFICIENCY: ICD-10-CM

## 2017-12-18 DIAGNOSIS — I10 ESSENTIAL HYPERTENSION: Primary | ICD-10-CM

## 2017-12-18 DIAGNOSIS — E53.8 VITAMIN B 12 DEFICIENCY: ICD-10-CM

## 2017-12-18 DIAGNOSIS — Z00.00 HEALTHCARE MAINTENANCE: ICD-10-CM

## 2017-12-18 DIAGNOSIS — E11.42 TYPE 2 DIABETES MELLITUS WITH PERIPHERAL NEUROPATHY (HCC): ICD-10-CM

## 2017-12-18 DIAGNOSIS — F51.04 CHRONIC INSOMNIA: ICD-10-CM

## 2017-12-18 PROCEDURE — 99214 OFFICE O/P EST MOD 30 MIN: CPT | Performed by: GENERAL PRACTICE

## 2017-12-18 RX ORDER — LORAZEPAM 1 MG/1
1 TABLET ORAL NIGHTLY PRN
Qty: 30 TABLET | Refills: 3 | Status: SHIPPED | OUTPATIENT
Start: 2017-12-18 | End: 2018-01-02 | Stop reason: SDUPTHER

## 2017-12-18 NOTE — PROGRESS NOTES
Subjective   Joselito Merida is a 77 y.o. male.     History of Present Illness     Allergic Rhinitis  Patient has a history of allergic rhinitis. Patient's symptoms include sneezing, clear rhinorrhea and postnasal drip. These symptoms are perennial with seasonal exacerbation. The patient has been suffering from these symptoms for a number of years . The patient has tried prescription antihistamine - loratadine with good relief of symptoms.      COPD  The patient reports that his SOB and cough have remained stable since last here. He states that the symptoms occur during the day. He reports that his symptoms become worse with activity and exposure to cold air. His symptoms are reduced with rest and with inhaled inhaled medication. The patient states he also has nasal congestion and post nasal drip/clearing throat. He states he is able to walk on level ground with no symptoms but has trouble with inclines, stairs, or lifting. He is following the treatment plan, including medications as directed. He has a history of 40 pack years.    Diabetes  Current symptoms include paresthesia of the feet. Patient denies visual disturbances, polydipsia, polyuria, hypoglycemia and foot ulcerations. Evaluation to date has been: hemoglobin A1C. Home sugars: patient does not check sugars. Current treatments: metformin. Last dilated eye exam within one year.   Lab Results   Component Value Date    HGBA1C 6.6 (H) 08/11/2017      Dyslipidemia  Compliance with treatment has been fair. The patient exercises intermittently. He is currently being prescribed the following medication for his dyslipidemia - pravastatin. Patient denies side effects associated with his medications.  Lab Results   Component Value Date    CHOL 156 03/24/2017    CHLPL 168 08/11/2017    TRIG 223 (H) 08/11/2017    HDL 57 08/11/2017    LDLCALC 64 03/24/2017    LDL 66 08/11/2017     Hypertension  Home blood pressure readings: not doing. Associated signs and symptoms: dyspnea.  Patient denies: chest pain, palpitations, orthopnea, paroxysmal nocturnal dyspnea and peripheral edema. Current antihypertensive medications includes lisinopril. Medication compliance: taking as prescribed.   Lab Results   Component Value Date    CREATININE 1.04 08/11/2017     Labs  Most recent vitamin B-12 671    The following portions of the patient's history were reviewed and updated as appropriate: allergies, current medications, past medical history, past social history and problem list.    Review of Systems   Constitutional: Negative for appetite change, chills, fatigue, fever and unexpected weight change.   HENT: Positive for postnasal drip, rhinorrhea and sneezing. Negative for congestion, ear pain, sinus pressure, sore throat and voice change.    Eyes: Negative for visual disturbance.   Respiratory: Positive for cough and shortness of breath. Negative for wheezing.    Cardiovascular: Negative for chest pain, palpitations and leg swelling.   Gastrointestinal: Negative for abdominal pain, blood in stool, constipation, diarrhea, nausea and vomiting.   Endocrine: Negative for polydipsia and polyuria.   Genitourinary: Negative for difficulty urinating, dysuria, frequency, hematuria and urgency.   Musculoskeletal: Negative for arthralgias, back pain, joint swelling, myalgias and neck pain.   Skin: Negative for color change.   Neurological: Positive for numbness. Negative for tremors, weakness and headaches.   Psychiatric/Behavioral: Positive for sleep disturbance. Negative for dysphoric mood and suicidal ideas. The patient is not nervous/anxious.      Objective   Physical Exam   Constitutional: He is oriented to person, place, and time. He appears well-developed and well-nourished. He is cooperative. He does not have a sickly appearance. No distress.   Well in general.  No apparent distress.  No pallor, cyanosis, diaphoresis, or jaundice   HENT:   Head: Atraumatic.   Mouth/Throat: Oropharynx is clear and moist.    Eyes: EOM are normal. Pupils are equal, round, and reactive to light. No scleral icterus.   Neck: No JVD present. Carotid bruit is not present. No tracheal deviation present. No thyromegaly present.   Cardiovascular: Normal rate, regular rhythm, S1 normal, S2 normal, normal heart sounds and intact distal pulses.  Exam reveals no gallop.    No murmur heard.  Pulmonary/Chest: No accessory muscle usage. No respiratory distress. He has decreased breath sounds. He has wheezes. He has no rales.   Pulmonary hyperinflation   Abdominal: Soft. Normal aorta and bowel sounds are normal. He exhibits no abdominal bruit and no mass. There is no hepatosplenomegaly. There is no tenderness. No hernia.   Musculoskeletal: He exhibits no tenderness or deformity.   Lymphadenopathy:        Head (right side): No submandibular adenopathy present.        Head (left side): No submandibular adenopathy present.     He has no cervical adenopathy.   Neurological: He is alert and oriented to person, place, and time. He has normal strength and normal reflexes. He displays normal reflexes. A sensory deficit (decreased vibration sense both feet) is present. No cranial nerve deficit. He exhibits normal muscle tone. Coordination normal.   Skin: Skin is warm and dry. Lesion (4-5 mm oval slightly pale slightly pearly and slightly raised papule upper pinna left ear - patient states this has been present for over a year, has not changed, and has not been associated with any symptoms) noted. No rash noted. He is not diaphoretic. No pallor. Nails show no clubbing.   Psychiatric: He has a normal mood and affect. His behavior is normal.     Assessment/Plan   Problems Addressed this Visit        Cardiovascular and Mediastinum    Essential hypertension   Hypertension: at goal. Evidence of target organ damage: none.  Encouraged to continue to work on diet and exercise plan.   Continue current medication  Updated labs will be drawn at his return    Mixed  hyperlipidemia  As above.   Continue current medication.       Respiratory    COPD (chronic obstructive pulmonary disease)  COPD is improving with smoking cessation.  Encouraged to remain as active as symptoms allow for    Chronic seasonal allergic rhinitis  Reminded regarding allergen avoidance.  Continue current medication       Digestive    Vitamin B 12 deficiency  Corrected   Continue oral supplementation with monitoring        Endocrine    Type 2 diabetes mellitus with peripheral neuropathy  Diabetes mellitus Type II, under excellent control.   Encouraged to continue to pursue ADA diet  Encouraged aerobic exercise.  Reminded to get yearly retinal exam.       Nervous and Auditory    Skin lesion of left ear  Reviewed options going forward.  Patient like to monitor and will report if any larger, any change in appearance, or any associated symptoms       Genitourinary    Benign non-nodular prostatic hyperplasia with lower urinary tract symptoms       Other    Chronic insomnia    Relevant Medications    LORazepam (ATIVAN) 1 MG tablet    Vasculogenic erectile dysfunction    Healthcare maintenance  Patient has already received a flu shot fall.    Former smoker

## 2017-12-28 DIAGNOSIS — I10 ESSENTIAL HYPERTENSION: ICD-10-CM

## 2017-12-28 DIAGNOSIS — N40.1 BENIGN NON-NODULAR PROSTATIC HYPERPLASIA WITH LOWER URINARY TRACT SYMPTOMS: ICD-10-CM

## 2017-12-29 RX ORDER — LISINOPRIL 10 MG/1
TABLET ORAL
Qty: 30 TABLET | Refills: 5 | OUTPATIENT
Start: 2017-12-29 | End: 2018-06-30 | Stop reason: SDUPTHER

## 2017-12-29 RX ORDER — TAMSULOSIN HYDROCHLORIDE 0.4 MG/1
CAPSULE ORAL
Qty: 30 CAPSULE | Refills: 5 | OUTPATIENT
Start: 2017-12-29 | End: 2018-06-30 | Stop reason: SDUPTHER

## 2018-01-02 DIAGNOSIS — E11.42 TYPE 2 DIABETES MELLITUS WITH PERIPHERAL NEUROPATHY (HCC): ICD-10-CM

## 2018-01-02 DIAGNOSIS — F51.04 CHRONIC INSOMNIA: ICD-10-CM

## 2018-01-03 DIAGNOSIS — E11.42 TYPE 2 DIABETES MELLITUS WITH PERIPHERAL NEUROPATHY (HCC): ICD-10-CM

## 2018-01-03 DIAGNOSIS — F51.04 CHRONIC INSOMNIA: ICD-10-CM

## 2018-01-03 RX ORDER — LORAZEPAM 1 MG/1
TABLET ORAL
Qty: 30 TABLET | Refills: 2 | Status: SHIPPED | OUTPATIENT
Start: 2018-01-03 | End: 2018-03-19 | Stop reason: SDUPTHER

## 2018-01-03 RX ORDER — GABAPENTIN 600 MG/1
TABLET ORAL
Qty: 30 TABLET | Refills: 2 | Status: SHIPPED | OUTPATIENT
Start: 2018-01-03 | End: 2018-03-19 | Stop reason: SDUPTHER

## 2018-01-03 RX ORDER — LORAZEPAM 1 MG/1
TABLET ORAL
Qty: 30 TABLET | Refills: 3 | OUTPATIENT
Start: 2018-01-03

## 2018-01-03 RX ORDER — GABAPENTIN 600 MG/1
TABLET ORAL
Qty: 30 TABLET | Refills: 3 | OUTPATIENT
Start: 2018-01-03

## 2018-01-29 DIAGNOSIS — E11.42 TYPE 2 DIABETES MELLITUS WITH PERIPHERAL NEUROPATHY (HCC): ICD-10-CM

## 2018-01-29 RX ORDER — METFORMIN HYDROCHLORIDE 500 MG/1
TABLET, EXTENDED RELEASE ORAL
Qty: 30 TABLET | Refills: 5 | Status: SHIPPED | OUTPATIENT
Start: 2018-01-29 | End: 2018-08-08 | Stop reason: SDUPTHER

## 2018-03-01 DIAGNOSIS — E78.49 OTHER HYPERLIPIDEMIA: ICD-10-CM

## 2018-03-01 RX ORDER — PRAVASTATIN SODIUM 40 MG
TABLET ORAL
Qty: 30 TABLET | Refills: 5 | Status: SHIPPED | OUTPATIENT
Start: 2018-03-01 | End: 2018-08-23 | Stop reason: SDUPTHER

## 2018-03-03 DIAGNOSIS — J30.9 CHRONIC ALLERGIC RHINITIS: ICD-10-CM

## 2018-03-03 DIAGNOSIS — F51.04 CHRONIC INSOMNIA: ICD-10-CM

## 2018-03-05 RX ORDER — HYDROXYZINE PAMOATE 50 MG/1
CAPSULE ORAL
Qty: 120 CAPSULE | Refills: 5 | Status: SHIPPED | OUTPATIENT
Start: 2018-03-05 | End: 2019-07-27 | Stop reason: SDUPTHER

## 2018-03-19 ENCOUNTER — OFFICE VISIT (OUTPATIENT)
Dept: FAMILY MEDICINE CLINIC | Facility: CLINIC | Age: 78
End: 2018-03-19

## 2018-03-19 VITALS
RESPIRATION RATE: 12 BRPM | DIASTOLIC BLOOD PRESSURE: 70 MMHG | OXYGEN SATURATION: 96 % | BODY MASS INDEX: 31.01 KG/M2 | TEMPERATURE: 98.5 F | WEIGHT: 175 LBS | HEIGHT: 63 IN | HEART RATE: 60 BPM | SYSTOLIC BLOOD PRESSURE: 125 MMHG

## 2018-03-19 DIAGNOSIS — N52.01 ERECTILE DYSFUNCTION DUE TO ARTERIAL INSUFFICIENCY: ICD-10-CM

## 2018-03-19 DIAGNOSIS — F51.04 CHRONIC INSOMNIA: ICD-10-CM

## 2018-03-19 DIAGNOSIS — N40.1 BENIGN NON-NODULAR PROSTATIC HYPERPLASIA WITH LOWER URINARY TRACT SYMPTOMS: ICD-10-CM

## 2018-03-19 DIAGNOSIS — J30.2 CHRONIC SEASONAL ALLERGIC RHINITIS, UNSPECIFIED TRIGGER: ICD-10-CM

## 2018-03-19 DIAGNOSIS — J44.9 CHRONIC OBSTRUCTIVE PULMONARY DISEASE, UNSPECIFIED COPD TYPE (HCC): ICD-10-CM

## 2018-03-19 DIAGNOSIS — E11.42 TYPE 2 DIABETES MELLITUS WITH PERIPHERAL NEUROPATHY (HCC): ICD-10-CM

## 2018-03-19 DIAGNOSIS — E78.2 MIXED HYPERLIPIDEMIA: ICD-10-CM

## 2018-03-19 DIAGNOSIS — Z87.891 FORMER SMOKER: ICD-10-CM

## 2018-03-19 DIAGNOSIS — I10 ESSENTIAL HYPERTENSION: Primary | ICD-10-CM

## 2018-03-19 DIAGNOSIS — H61.92 SKIN LESION OF LEFT EAR: ICD-10-CM

## 2018-03-19 DIAGNOSIS — E53.8 VITAMIN B 12 DEFICIENCY: ICD-10-CM

## 2018-03-19 DIAGNOSIS — Z00.00 HEALTHCARE MAINTENANCE: ICD-10-CM

## 2018-03-19 PROCEDURE — 99214 OFFICE O/P EST MOD 30 MIN: CPT | Performed by: GENERAL PRACTICE

## 2018-03-19 RX ORDER — GABAPENTIN 600 MG/1
600 TABLET ORAL DAILY
Qty: 30 TABLET | Refills: 2 | Status: SHIPPED | OUTPATIENT
Start: 2018-03-19 | End: 2018-06-19 | Stop reason: SDUPTHER

## 2018-03-19 RX ORDER — NEOMYCIN SULFATE, POLYMYXIN B SULFATE AND HYDROCORTISONE 10; 3.5; 1 MG/ML; MG/ML; [USP'U]/ML
SUSPENSION/ DROPS AURICULAR (OTIC)
Qty: 10 ML | Refills: 0 | Status: SHIPPED | OUTPATIENT
Start: 2018-03-19 | End: 2018-03-26

## 2018-03-19 RX ORDER — LORAZEPAM 1 MG/1
1 TABLET ORAL NIGHTLY
Qty: 30 TABLET | Refills: 2 | Status: SHIPPED | OUTPATIENT
Start: 2018-03-19 | End: 2018-06-19 | Stop reason: SDUPTHER

## 2018-03-19 NOTE — PROGRESS NOTES
Subjective   Joselito Merida is a 77 y.o. male.     History of Present Illness     Allergic Rhinitis  Patient has a history of allergic rhinitis. Patient's symptoms include sneezing, clear rhinorrhea and postnasal drip. These symptoms are perennial with seasonal exacerbation.  With the onset of spring his symptoms have been worse once more.  The patient has been suffering from these symptoms for a number of years . The patient has tried prescription antihistamine - loratadine with good relief of symptoms.      COPD  The patient reports that his SOB and cough have remained stable since last here. He states that the symptoms occur during the day. He reports that his symptoms become worse with activity and exposure to cold air. His symptoms are reduced with rest and with inhaled inhaled medication. The patient states he also has nasal congestion and post nasal drip/clearing throat. He states he is able to walk on level ground with no symptoms but has trouble with inclines, stairs, or lifting. He is following the treatment plan, including medications as directed. He has a history of 40 pack years.    Diabetes  Current symptoms include paresthesia of the feet. Patient denies visual disturbances, polydipsia, polyuria, hypoglycemia and foot ulcerations. Evaluation to date has been: hemoglobin A1C. Home sugars: patient does not check sugars. Current treatments: metformin. Last dilated eye exam within one year.  He has had no recent labs     Dyslipidemia  Compliance with treatment has been fair. The patient exercises intermittently. He is currently being prescribed the following medication for his dyslipidemia - pravastatin. Patient denies side effects associated with his medications.    Hypertension  Home blood pressure readings: not doing. Associated signs and symptoms: dyspnea. Patient denies: chest pain, palpitations, orthopnea, paroxysmal nocturnal dyspnea and peripheral edema. Current antihypertensive medications includes  lisinopril. Medication compliance: taking as prescribed.     The following portions of the patient's history were reviewed and updated as appropriate: allergies, current medications, past medical history, past social history and problem list.    Review of Systems   Constitutional: Negative for appetite change, chills, fatigue, fever and unexpected weight change.   HENT: Positive for postnasal drip, rhinorrhea and sneezing. Negative for congestion, ear pain, sinus pressure, sore throat and voice change.    Eyes: Negative for visual disturbance.   Respiratory: Positive for cough and shortness of breath. Negative for wheezing.    Cardiovascular: Negative for chest pain, palpitations and leg swelling.   Gastrointestinal: Negative for abdominal pain, blood in stool, constipation, diarrhea, nausea and vomiting.   Endocrine: Negative for polydipsia and polyuria.   Genitourinary: Negative for difficulty urinating, dysuria, frequency, hematuria and urgency.   Musculoskeletal: Negative for arthralgias, back pain, joint swelling, myalgias and neck pain.   Skin: Negative for color change.   Neurological: Positive for numbness. Negative for tremors, weakness and headaches.   Psychiatric/Behavioral: Positive for sleep disturbance. Negative for dysphoric mood and suicidal ideas. The patient is not nervous/anxious.      Objective   Physical Exam   Constitutional: He is oriented to person, place, and time. He appears well-developed and well-nourished. He is cooperative. He does not have a sickly appearance. No distress.   Well in general.  No apparent distress.  No pallor, cyanosis, diaphoresis, or jaundice   HENT:   Head: Atraumatic.   Mouth/Throat: Oropharynx is clear and moist.   Eyes: EOM are normal. Pupils are equal, round, and reactive to light. No scleral icterus.   Neck: No JVD present. Carotid bruit is not present. No tracheal deviation present. No thyromegaly present.   Cardiovascular: Normal rate, regular rhythm, S1  normal, S2 normal, normal heart sounds and intact distal pulses.  Exam reveals no gallop.    No murmur heard.  Pulmonary/Chest: No accessory muscle usage. No respiratory distress. He has decreased breath sounds. He has wheezes. He has no rales.   Pulmonary hyperinflation   Abdominal: Soft. Normal aorta and bowel sounds are normal. He exhibits no abdominal bruit and no mass. There is no hepatosplenomegaly. There is no tenderness. No hernia.   Musculoskeletal: He exhibits no tenderness or deformity.   Lymphadenopathy:        Head (right side): No submandibular adenopathy present.        Head (left side): No submandibular adenopathy present.     He has no cervical adenopathy.   Neurological: He is alert and oriented to person, place, and time. He has normal strength and normal reflexes. He displays normal reflexes. A sensory deficit (decreased vibration sense both feet) is present. No cranial nerve deficit. He exhibits normal muscle tone. Coordination normal.   Skin: Skin is warm and dry. Lesion (4-5 mm oval slightly pale slightly pearly and slightly raised papule upper pinna left ear - appears unchanged from last) noted. No rash noted. He is not diaphoretic. No pallor. Nails show no clubbing.   Psychiatric: He has a normal mood and affect. His behavior is normal.     Assessment/Plan   Problems Addressed this Visit        Cardiovascular and Mediastinum    Essential hypertension   Encouraged to continue to work on his diet and exercise plan.  Continue current medication  Updated labs drawn     Relevant Orders    CBC & Differential    Comprehensive Metabolic Panel    Mixed hyperlipidemia  As above.   Continue current medication.    Relevant Orders    Lipid Panel    TSH       Respiratory    COPD (chronic obstructive pulmonary disease)  COPD is stable.  Encouraged to remain as active as symptoms allow for  Trial of trelegy - provided with samples    Chronic seasonal allergic rhinitis  Reminded regarding allergen  avoidance.  Continue current medication       Digestive    Vitamin B 12 deficiency  Continue maintenance supplementation with monitoring.    Relevant Orders    CBC & Differential    Vitamin B12       Endocrine    Type 2 diabetes mellitus with peripheral neuropathy  Diabetes mellitus Type II, under unknown control.   Encouraged to continue to pursue ADA diet  Encouraged aerobic exercise.    Relevant Medications    gabapentin (NEURONTIN) 600 MG tablet    Other Relevant Orders    Hemoglobin A1c    MicroAlbumin, Urine, Random - Urine, Clean Catch       Nervous and Auditory    Skin lesion of left ear  Stable  Will continue to monitor       Genitourinary    Benign non-nodular prostatic hyperplasia with lower urinary tract symptoms       Other    Chronic insomnia    Relevant Medications    LORazepam (ATIVAN) 1 MG tablet    Vasculogenic erectile dysfunction    Healthcare maintenance  Reviewed the potential benefits of shingrix. Prescription written.    Relevant Medications    Zoster Vac Recomb Adjuvanted (SHINGRIX) 50 MCG reconstituted suspension    Former smoker

## 2018-03-20 LAB
ALBUMIN SERPL-MCNC: 4.5 G/DL (ref 3.4–4.8)
ALBUMIN/GLOB SERPL: 1.5 G/DL (ref 1.5–2.5)
ALP SERPL-CCNC: 45 U/L (ref 40–129)
ALT SERPL-CCNC: 72 U/L (ref 10–44)
AST SERPL-CCNC: 84 U/L (ref 10–34)
BASOPHILS # BLD AUTO: 0.02 10*3/MM3 (ref 0–0.3)
BASOPHILS NFR BLD AUTO: 0.4 % (ref 0–2)
BILIRUB SERPL-MCNC: 0.6 MG/DL (ref 0.2–1.8)
BUN SERPL-MCNC: 19 MG/DL (ref 7–21)
BUN/CREAT SERPL: 17.4 (ref 7–25)
CALCIUM SERPL-MCNC: 9.8 MG/DL (ref 7.7–10)
CHLORIDE SERPL-SCNC: 103 MMOL/L (ref 99–112)
CHOLEST SERPL-MCNC: 155 MG/DL (ref 0–200)
CO2 SERPL-SCNC: 26.9 MMOL/L (ref 24.3–31.9)
CREAT SERPL-MCNC: 1.09 MG/DL (ref 0.43–1.29)
EOSINOPHIL # BLD AUTO: 0.37 10*3/MM3 (ref 0–0.7)
EOSINOPHIL NFR BLD AUTO: 6.5 % (ref 0–7)
ERYTHROCYTE [DISTWIDTH] IN BLOOD BY AUTOMATED COUNT: 12.9 % (ref 11.5–14.5)
GFR SERPLBLD CREATININE-BSD FMLA CKD-EPI: 66 ML/MIN/1.73
GFR SERPLBLD CREATININE-BSD FMLA CKD-EPI: 80 ML/MIN/1.73
GLOBULIN SER CALC-MCNC: 3 GM/DL
GLUCOSE SERPL-MCNC: 176 MG/DL (ref 70–110)
HBA1C MFR BLD: 7.4 % (ref 4.5–5.7)
HCT VFR BLD AUTO: 44.8 % (ref 42–52)
HDLC SERPL-MCNC: 59 MG/DL (ref 60–100)
HGB BLD-MCNC: 15.6 G/DL (ref 14–18)
IMM GRANULOCYTES # BLD: 0.03 10*3/MM3 (ref 0–0.03)
IMM GRANULOCYTES NFR BLD: 0.5 % (ref 0–0.5)
LDLC SERPL CALC-MCNC: 50 MG/DL (ref 0–100)
LYMPHOCYTES # BLD AUTO: 1.7 10*3/MM3 (ref 1–3)
LYMPHOCYTES NFR BLD AUTO: 29.8 % (ref 16–46)
MCH RBC QN AUTO: 29.8 PG (ref 27–33)
MCHC RBC AUTO-ENTMCNC: 34.8 G/DL (ref 33–37)
MCV RBC AUTO: 85.5 FL (ref 80–94)
MICROALBUMIN UR-MCNC: 11.3 UG/ML
MONOCYTES # BLD AUTO: 0.45 10*3/MM3 (ref 0.1–0.9)
MONOCYTES NFR BLD AUTO: 7.9 % (ref 0–12)
NEUTROPHILS # BLD AUTO: 3.13 10*3/MM3 (ref 1.4–6.5)
NEUTROPHILS NFR BLD AUTO: 54.9 % (ref 40–75)
PLATELET # BLD AUTO: 177 10*3/MM3 (ref 130–400)
POTASSIUM SERPL-SCNC: 4.5 MMOL/L (ref 3.5–5.3)
PROT SERPL-MCNC: 7.5 G/DL (ref 6–8)
RBC # BLD AUTO: 5.24 10*6/MM3 (ref 4.7–6.1)
SODIUM SERPL-SCNC: 139 MMOL/L (ref 135–153)
TRIGL SERPL-MCNC: 231 MG/DL (ref 0–150)
TSH SERPL DL<=0.005 MIU/L-ACNC: 2.48 MIU/ML (ref 0.55–4.78)
VIT B12 SERPL-MCNC: 858 PG/ML (ref 211–911)
VLDLC SERPL CALC-MCNC: 46.2 MG/DL
WBC # BLD AUTO: 5.7 10*3/MM3 (ref 4.5–12.5)

## 2018-06-19 ENCOUNTER — OFFICE VISIT (OUTPATIENT)
Dept: FAMILY MEDICINE CLINIC | Facility: CLINIC | Age: 78
End: 2018-06-19

## 2018-06-19 DIAGNOSIS — E53.8 VITAMIN B 12 DEFICIENCY: ICD-10-CM

## 2018-06-19 DIAGNOSIS — N40.1 BENIGN NON-NODULAR PROSTATIC HYPERPLASIA WITH LOWER URINARY TRACT SYMPTOMS: ICD-10-CM

## 2018-06-19 DIAGNOSIS — Z87.891 FORMER SMOKER: ICD-10-CM

## 2018-06-19 DIAGNOSIS — I10 ESSENTIAL HYPERTENSION: Primary | ICD-10-CM

## 2018-06-19 DIAGNOSIS — Z00.00 HEALTHCARE MAINTENANCE: ICD-10-CM

## 2018-06-19 DIAGNOSIS — F51.04 CHRONIC INSOMNIA: ICD-10-CM

## 2018-06-19 DIAGNOSIS — N52.01 ERECTILE DYSFUNCTION DUE TO ARTERIAL INSUFFICIENCY: ICD-10-CM

## 2018-06-19 DIAGNOSIS — J30.2 CHRONIC SEASONAL ALLERGIC RHINITIS, UNSPECIFIED TRIGGER: ICD-10-CM

## 2018-06-19 DIAGNOSIS — J44.9 COPD MIXED TYPE (HCC): ICD-10-CM

## 2018-06-19 DIAGNOSIS — E78.2 MIXED HYPERLIPIDEMIA: ICD-10-CM

## 2018-06-19 DIAGNOSIS — E11.42 TYPE 2 DIABETES MELLITUS WITH PERIPHERAL NEUROPATHY (HCC): ICD-10-CM

## 2018-06-19 DIAGNOSIS — K70.0 FATTY LIVER, ALCOHOLIC: ICD-10-CM

## 2018-06-19 PROCEDURE — 99214 OFFICE O/P EST MOD 30 MIN: CPT | Performed by: GENERAL PRACTICE

## 2018-06-19 RX ORDER — GABAPENTIN 600 MG/1
600 TABLET ORAL DAILY
Qty: 30 TABLET | Refills: 2 | Status: SHIPPED | OUTPATIENT
Start: 2018-06-19 | End: 2018-08-24 | Stop reason: SDUPTHER

## 2018-06-19 RX ORDER — LORAZEPAM 1 MG/1
1 TABLET ORAL NIGHTLY
Qty: 30 TABLET | Refills: 2 | Status: SHIPPED | OUTPATIENT
Start: 2018-06-19 | End: 2018-08-24 | Stop reason: SDUPTHER

## 2018-06-19 RX ORDER — SILDENAFIL CITRATE 20 MG/1
TABLET ORAL
Qty: 60 TABLET | Refills: 2 | Status: SHIPPED | OUTPATIENT
Start: 2018-06-19 | End: 2018-11-29 | Stop reason: SDUPTHER

## 2018-06-19 NOTE — PROGRESS NOTES
Subjective   Joselito Merida is a 77 y.o. male.     History of Present Illness     Chronic Allergic Rhinitis  Patient has a history of allergic rhinitis. Patient's symptoms include sneezing, clear rhinorrhea and postnasal drip. These symptoms are perennial with seasonal exacerbation.  Through spring his symptoms have been worse once more.  The patient has been suffering from these symptoms for a number of years . The patient has been using loratadine with good relief of symptoms.      COPD  The patient reports that his SOB and cough have remained stable. He states that the symptoms occur during the day. He reports that his symptoms become worse with activity and exposure to cold air. His symptoms are reduced with rest and with inhaled inhaled medication. The patient states he also has nasal congestion and post nasal drip/clearing throat. He states he is able to walk on level ground with no symptoms but has trouble with inclines, stairs, or lifting. He is following the treatment plan, including medications as directed. He remains on breo generally provided through samples. He has a history of 40 pack years.    Diabetes  Current symptoms include paresthesia of the feet. Patient denies visual disturbances, polydipsia, polyuria, hypoglycemia and foot ulcerations. Evaluation to date has been: hemoglobin A1C. Home sugars: patient does not check sugars. Current treatments: metformin. Last dilated eye exam within one year.   Lab Results   Component Value Date    HGBA1C 7.40 (H) 03/19/2018     Dyslipidemia  Compliance with treatment has been fair. The patient exercises intermittently. He is currently being prescribed the following medication for his dyslipidemia - pravastatin. Patient denies side effects associated with his medications.  Lab Results   Component Value Date    CHOL 156 03/24/2017    CHLPL 155 03/19/2018    TRIG 231 (H) 03/19/2018    HDL 59 (L) 03/19/2018    LDL 50 03/19/2018     Hypertension  Home blood pressure  readings: not doing. Associated signs and symptoms: dyspnea. Patient denies: chest pain, palpitations, orthopnea, paroxysmal nocturnal dyspnea and peripheral edema. Current antihypertensive medications includes lisinopril. Medication compliance: taking as prescribed.   Lab Results   Component Value Date    CREATININE 1.09 03/19/2018     Labs  Most recent [id number] [id number]    The following portions of the patient's history were reviewed and updated as appropriate: allergies, current medications, past medical history, past social history and problem list.    Review of Systems   Constitutional: Negative for appetite change, chills, fatigue, fever and unexpected weight change.   HENT: Positive for postnasal drip, rhinorrhea and sneezing. Negative for congestion, ear pain, sinus pressure, sore throat and voice change.    Eyes: Negative for visual disturbance.   Respiratory: Positive for cough and shortness of breath. Negative for wheezing.    Cardiovascular: Negative for chest pain, palpitations and leg swelling.   Gastrointestinal: Negative for abdominal pain, blood in stool, constipation, diarrhea, nausea and vomiting.   Endocrine: Negative for polydipsia and polyuria.   Genitourinary: Negative for difficulty urinating, dysuria, frequency, hematuria and urgency.   Musculoskeletal: Negative for arthralgias, back pain, joint swelling, myalgias and neck pain.   Skin: Negative for color change.   Neurological: Positive for numbness. Negative for tremors, weakness and headaches.   Psychiatric/Behavioral: Positive for sleep disturbance. Negative for dysphoric mood and suicidal ideas. The patient is not nervous/anxious.      Objective   Physical Exam   Constitutional: He is oriented to person, place, and time. He appears well-developed and well-nourished. He is cooperative. He does not have a sickly appearance. No distress.   Well in general.  No apparent distress.  No pallor, cyanosis, diaphoresis, or jaundice   HENT:   Head:  Atraumatic.   Mouth/Throat: Oropharynx is clear and moist.   Eyes: EOM are normal. Pupils are equal, round, and reactive to light. No scleral icterus.   Neck: No JVD present. Carotid bruit is not present. No tracheal deviation present. No thyromegaly present.   Cardiovascular: Normal rate, regular rhythm, S1 normal, S2 normal, normal heart sounds and intact distal pulses.  Exam reveals no gallop.    No murmur heard.  Pulmonary/Chest: No accessory muscle usage. No respiratory distress. He has decreased breath sounds. He has wheezes. He has no rales.   Pulmonary hyperinflation   Abdominal: Soft. Normal aorta and bowel sounds are normal. He exhibits no abdominal bruit and no mass. There is no hepatosplenomegaly. There is no tenderness. No hernia.   Musculoskeletal: He exhibits no tenderness or deformity.   Lymphadenopathy:        Head (right side): No submandibular adenopathy present.        Head (left side): No submandibular adenopathy present.     He has no cervical adenopathy.   Neurological: He is alert and oriented to person, place, and time. He has normal strength and normal reflexes. He displays normal reflexes. A sensory deficit (decreased vibration sense both feet) is present. No cranial nerve deficit. He exhibits normal muscle tone. Coordination normal.   Skin: Skin is warm and dry. Lesion (4-5 mm oval slightly pale slightly pearly and slightly raised papule upper pinna left ear - appears unchanged from last) noted. No rash noted. He is not diaphoretic. No pallor. Nails show no clubbing.   Psychiatric: He has a normal mood and affect. His behavior is normal.     Assessment/Plan   Problems Addressed this Visit        Cardiovascular and Mediastinum    Essential hypertension   Hypertension: elevated today. Evidence of target organ damage: none.  Encouraged to continue to work on diet and exercise plan.   Continue current medication for now however will consider titrating lisinopril if elevated again at his  return    Mixed hyperlipidemia  As above.   Continue current medication.       Respiratory    COPD mixed type  COPD is stable.  Encouraged to remain as active as symptoms allow for  Breo replaced with trelegy due to the availability of samples  Relevant Medications   trelegy 1 puff daily       Chronic seasonal allergic rhinitis  As above.   Continue current medication.       Digestive    Vitamin B 12 deficiency  Corrected  Continue maintenance supplementation with monitoring.    Fatty liver, alcoholic       Endocrine    Type 2 diabetes mellitus with peripheral neuropathy  Diabetes mellitus Type II, under good control.   Encouraged to continue to pursue ADA diet  Encouraged aerobic exercise.  Reminded to get yearly retinal exam.  Updated labs will be arranged at return.    Relevant Medications    gabapentin (NEURONTIN) 600 MG tablet       Genitourinary    Benign non-nodular prostatic hyperplasia with lower urinary tract symptoms       Other    Chronic insomnia    Relevant Medications    LORazepam (ATIVAN) 1 MG tablet    Vasculogenic erectile dysfunction  Prescription written for generic sildenafil. Reminded of the potential side effects and in particular the danger of combining with NTG    Relevant Medications    sildenafil (REVATIO) 20 MG tablet    Healthcare maintenance  Encouraged to follow up with juven[facility].    Former smoker

## 2018-06-20 VITALS
WEIGHT: 171 LBS | RESPIRATION RATE: 12 BRPM | HEIGHT: 63 IN | SYSTOLIC BLOOD PRESSURE: 150 MMHG | BODY MASS INDEX: 30.3 KG/M2 | DIASTOLIC BLOOD PRESSURE: 80 MMHG | HEART RATE: 52 BPM | OXYGEN SATURATION: 93 % | TEMPERATURE: 98.6 F

## 2018-06-30 DIAGNOSIS — F51.04 CHRONIC INSOMNIA: ICD-10-CM

## 2018-06-30 DIAGNOSIS — E11.42 TYPE 2 DIABETES MELLITUS WITH PERIPHERAL NEUROPATHY (HCC): ICD-10-CM

## 2018-06-30 DIAGNOSIS — I10 ESSENTIAL HYPERTENSION: ICD-10-CM

## 2018-06-30 DIAGNOSIS — N40.1 BENIGN NON-NODULAR PROSTATIC HYPERPLASIA WITH LOWER URINARY TRACT SYMPTOMS: ICD-10-CM

## 2018-07-02 RX ORDER — LORAZEPAM 1 MG/1
TABLET ORAL
Qty: 30 TABLET | Refills: 2 | OUTPATIENT
Start: 2018-07-02

## 2018-07-02 RX ORDER — LISINOPRIL 10 MG/1
TABLET ORAL
Qty: 30 TABLET | Refills: 5 | Status: SHIPPED | OUTPATIENT
Start: 2018-07-02 | End: 2019-01-03 | Stop reason: SDUPTHER

## 2018-07-02 RX ORDER — TAMSULOSIN HYDROCHLORIDE 0.4 MG/1
CAPSULE ORAL
Qty: 30 CAPSULE | Refills: 5 | Status: SHIPPED | OUTPATIENT
Start: 2018-07-02 | End: 2019-01-03 | Stop reason: SDUPTHER

## 2018-07-02 RX ORDER — GABAPENTIN 600 MG/1
TABLET ORAL
Qty: 30 TABLET | Refills: 2 | OUTPATIENT
Start: 2018-07-02

## 2018-08-08 DIAGNOSIS — E11.42 TYPE 2 DIABETES MELLITUS WITH PERIPHERAL NEUROPATHY (HCC): ICD-10-CM

## 2018-08-11 DIAGNOSIS — E11.42 TYPE 2 DIABETES MELLITUS WITH PERIPHERAL NEUROPATHY (HCC): ICD-10-CM

## 2018-08-23 DIAGNOSIS — E78.49 OTHER HYPERLIPIDEMIA: ICD-10-CM

## 2018-08-23 RX ORDER — PRAVASTATIN SODIUM 40 MG
TABLET ORAL
Qty: 30 TABLET | Refills: 5 | Status: SHIPPED | OUTPATIENT
Start: 2018-08-23 | End: 2019-03-06 | Stop reason: SDUPTHER

## 2018-08-24 ENCOUNTER — OFFICE VISIT (OUTPATIENT)
Dept: FAMILY MEDICINE CLINIC | Facility: CLINIC | Age: 78
End: 2018-08-24

## 2018-08-24 DIAGNOSIS — E53.8 VITAMIN B 12 DEFICIENCY: ICD-10-CM

## 2018-08-24 DIAGNOSIS — J44.9 COPD MIXED TYPE (HCC): ICD-10-CM

## 2018-08-24 DIAGNOSIS — E78.2 MIXED HYPERLIPIDEMIA: ICD-10-CM

## 2018-08-24 DIAGNOSIS — J30.2 CHRONIC SEASONAL ALLERGIC RHINITIS, UNSPECIFIED TRIGGER: ICD-10-CM

## 2018-08-24 DIAGNOSIS — Z87.891 FORMER SMOKER: ICD-10-CM

## 2018-08-24 DIAGNOSIS — K70.0 FATTY LIVER, ALCOHOLIC: ICD-10-CM

## 2018-08-24 DIAGNOSIS — D36.9 DERMOID CYST: ICD-10-CM

## 2018-08-24 DIAGNOSIS — N52.01 ERECTILE DYSFUNCTION DUE TO ARTERIAL INSUFFICIENCY: ICD-10-CM

## 2018-08-24 DIAGNOSIS — Z00.00 HEALTHCARE MAINTENANCE: ICD-10-CM

## 2018-08-24 DIAGNOSIS — I10 ESSENTIAL HYPERTENSION: Primary | ICD-10-CM

## 2018-08-24 DIAGNOSIS — E11.42 TYPE 2 DIABETES MELLITUS WITH PERIPHERAL NEUROPATHY (HCC): ICD-10-CM

## 2018-08-24 DIAGNOSIS — N40.1 BENIGN NON-NODULAR PROSTATIC HYPERPLASIA WITH LOWER URINARY TRACT SYMPTOMS: ICD-10-CM

## 2018-08-24 DIAGNOSIS — F51.04 CHRONIC INSOMNIA: ICD-10-CM

## 2018-08-24 PROCEDURE — 99214 OFFICE O/P EST MOD 30 MIN: CPT | Performed by: GENERAL PRACTICE

## 2018-08-24 RX ORDER — LORAZEPAM 1 MG/1
1 TABLET ORAL NIGHTLY
Qty: 30 TABLET | Refills: 1 | Status: SHIPPED | OUTPATIENT
Start: 2018-08-24 | End: 2018-11-26 | Stop reason: SDUPTHER

## 2018-08-24 RX ORDER — SULFAMETHOXAZOLE AND TRIMETHOPRIM 800; 160 MG/1; MG/1
1 TABLET ORAL 2 TIMES DAILY
Qty: 14 TABLET | Refills: 0 | Status: SHIPPED | OUTPATIENT
Start: 2018-08-24 | End: 2018-08-31

## 2018-08-24 RX ORDER — GABAPENTIN 600 MG/1
600 TABLET ORAL DAILY
Qty: 30 TABLET | Refills: 1 | Status: SHIPPED | OUTPATIENT
Start: 2018-08-24 | End: 2018-11-29 | Stop reason: SDUPTHER

## 2018-08-24 NOTE — PROGRESS NOTES
Subjective   Joselito Merida is a 77 y.o. male.     History of Present Illness     Skin Lesion  He gives a several month history of a swelling over his left mid back. This appeared suddenly and has not changed much since. With the exception of mild tenderness with pressure and a small amount of drainage this week he denies any associated symptoms. There's no history of pain otherwise and he denies any fever or chills.    COPD  The patient reports that his SOB and cough have remained stable. He states that the symptoms occur during the day. He reports that his symptoms become worse with activity and exposure to cold air. His symptoms are reduced with rest and with inhaled inhaled medication. The patient states he also has nasal congestion and post nasal drip/clearing throat. He states he is able to walk on level ground with no symptoms but has trouble with inclines, stairs, or lifting. He is following the treatment plan, including medications as directed. He remains on breo generally provided through samples. He has a history of 40 pack years.    Diabetes  Current symptoms include paresthesia of the feet. Patient denies visual disturbances, polydipsia, polyuria, hypoglycemia and foot ulcerations. Evaluation to date has been: hemoglobin A1C. Home sugars: patient does not check sugars. Current treatments: metformin. Last dilated eye exam within one year. He has had no recent labs    Dyslipidemia  Compliance with treatment has been fair. The patient exercises intermittently. He is currently being prescribed the following medication for his dyslipidemia - pravastatin. Patient denies side effects associated with his medications.    Hypertension  Home blood pressure readings: not doing. Associated signs and symptoms: dyspnea. Patient denies: chest pain, palpitations, orthopnea, paroxysmal nocturnal dyspnea and peripheral edema. Current antihypertensive medications includes lisinopril. Medication compliance: taking as  prescribed.     The following portions of the patient's history were reviewed and updated as appropriate: allergies, current medications, past medical history, past social history and problem list.    Review of Systems   Constitutional: Negative for appetite change, chills, fatigue, fever and unexpected weight change.   HENT: Positive for postnasal drip, rhinorrhea and sneezing. Negative for congestion, ear pain, sinus pressure, sore throat and voice change.    Eyes: Negative for visual disturbance.   Respiratory: Positive for cough and shortness of breath. Negative for wheezing.    Cardiovascular: Negative for chest pain, palpitations and leg swelling.   Gastrointestinal: Negative for abdominal pain, blood in stool, constipation, diarrhea, nausea and vomiting.   Endocrine: Negative for polydipsia and polyuria.   Genitourinary: Negative for difficulty urinating, dysuria, frequency, hematuria and urgency.   Musculoskeletal: Negative for arthralgias, back pain, joint swelling, myalgias and neck pain.   Skin: Negative for color change.        Lesion left mid back   Neurological: Positive for numbness. Negative for tremors, weakness and headaches.   Psychiatric/Behavioral: Positive for sleep disturbance. Negative for dysphoric mood and suicidal ideas. The patient is not nervous/anxious.      Objective   Physical Exam   Constitutional: He is oriented to person, place, and time. He appears well-developed and well-nourished. He is cooperative. He does not have a sickly appearance. No distress.   Well in general.  No apparent distress.  No pallor, cyanosis, diaphoresis, or jaundice   HENT:   Head: Atraumatic.   Mouth/Throat: Oropharynx is clear and moist.   Eyes: Pupils are equal, round, and reactive to light. EOM are normal. No scleral icterus.   Neck: No JVD present. Carotid bruit is not present. No tracheal deviation present. No thyromegaly present.   Cardiovascular: Normal rate, regular rhythm, S1 normal, S2 normal,  normal heart sounds and intact distal pulses.  Exam reveals no gallop.    No murmur heard.  Pulmonary/Chest: No accessory muscle usage. No respiratory distress. He has decreased breath sounds. He has wheezes. He has no rales.   Pulmonary hyperinflation   Abdominal: Soft. Normal aorta and bowel sounds are normal. He exhibits no abdominal bruit and no mass. There is no hepatosplenomegaly. There is no tenderness. No hernia.   Musculoskeletal: He exhibits no tenderness or deformity.   Lymphadenopathy:        Head (right side): No submandibular adenopathy present.        Head (left side): No submandibular adenopathy present.     He has no cervical adenopathy.   Neurological: He is alert and oriented to person, place, and time. He has normal strength and normal reflexes. He displays normal reflexes. A sensory deficit (decreased vibration sense both feet) is present. No cranial nerve deficit. He exhibits normal muscle tone. Coordination normal.   Skin: Skin is warm and dry. No rash noted. He is not diaphoretic. No pallor. Nails show no clubbing.   2.5 cm oval well demarcated cystic oval mass left mid back. Small amount of scabbing over the lateral aspect. Slightly tender with pressure and a small amount of serosanguinous fluid expressed from scabbed area. No fluctuance or warmth. 4-5 mm oval slightly pale slightly pearly and slightly raised papule upper pinna left ear - appears unchanged from previous.   Psychiatric: He has a normal mood and affect. His behavior is normal.     Assessment/Plan   Problems Addressed this Visit        Cardiovascular and Mediastinum    Essential hypertension   Hypertension: at goal. Evidence of target organ damage: none.  Encouraged to continue to work on diet and exercise plan.   Continue current medication  Updated labs scheduled    Relevant Orders    CBC & Differential    Comprehensive Metabolic Panel    Mixed hyperlipidemia  As above.   Continue current medication.    Relevant Orders     Lipid Panel       Respiratory    COPD mixed type (CMS/HCC)  COPD is stable.  Encouraged to remain as active as symptoms allow for    Chronic seasonal allergic rhinitis       Digestive    Vitamin B 12 deficiency    Fatty liver, alcoholic       Endocrine    Type 2 diabetes mellitus with peripheral neuropathy (CMS/HCC)  Diabetes mellitus Type II, under excellent control.   Encouraged to continue to pursue ADA diet  Encouraged aerobic exercise.  Reminded to get yearly retinal exam.    Relevant Medications    gabapentin (NEURONTIN) 600 MG tablet    Other Relevant Orders    Hemoglobin A1c    MicroAlbumin, Urine, Random - Urine, Clean Catch       Genitourinary    Benign non-nodular prostatic hyperplasia with lower urinary tract symptoms    Dermoid cyst  Left mid back. Appears slightly inflamed  Specimen of drainage sent for C+S  Will start on empiric antibiotics   Encouraged to report if any worse or if any new symptoms or concerns.  Will discuss excision at his return    Relevant Medications    sulfamethoxazole-trimethoprim (BACTRIM DS,SEPTRA DS) 800-160 MG per tablet    Other Relevant Orders    Culture, Routine - Swab, Back       Other    Chronic insomnia    Relevant Medications    LORazepam (ATIVAN) 1 MG tablet    Vasculogenic erectile dysfunction    Healthcare maintenance  Encouraged to follow up with shingrix.  Reminded to get a flu shot when available.    Former smoker

## 2018-08-26 VITALS
HEART RATE: 50 BPM | DIASTOLIC BLOOD PRESSURE: 65 MMHG | OXYGEN SATURATION: 93 % | RESPIRATION RATE: 12 BRPM | WEIGHT: 172 LBS | HEIGHT: 63 IN | TEMPERATURE: 98.7 F | BODY MASS INDEX: 30.48 KG/M2 | SYSTOLIC BLOOD PRESSURE: 125 MMHG

## 2018-09-01 LAB
BACTERIA SPEC AEROBE CULT: ABNORMAL
BACTERIA SPEC CULT: ABNORMAL
OTHER ANTIBIOTIC SUSC ISLT: ABNORMAL

## 2018-09-26 ENCOUNTER — CLINICAL SUPPORT (OUTPATIENT)
Dept: FAMILY MEDICINE CLINIC | Facility: CLINIC | Age: 78
End: 2018-09-26

## 2018-09-26 ENCOUNTER — LAB (OUTPATIENT)
Dept: FAMILY MEDICINE CLINIC | Facility: CLINIC | Age: 78
End: 2018-09-26

## 2018-09-26 DIAGNOSIS — I10 ESSENTIAL HYPERTENSION: ICD-10-CM

## 2018-09-26 DIAGNOSIS — E11.42 TYPE 2 DIABETES MELLITUS WITH PERIPHERAL NEUROPATHY (HCC): ICD-10-CM

## 2018-09-26 DIAGNOSIS — E78.2 MIXED HYPERLIPIDEMIA: ICD-10-CM

## 2018-09-26 DIAGNOSIS — Z23 ENCOUNTER FOR IMMUNIZATION: Primary | ICD-10-CM

## 2018-09-26 PROCEDURE — 90686 IIV4 VACC NO PRSV 0.5 ML IM: CPT | Performed by: GENERAL PRACTICE

## 2018-09-26 PROCEDURE — 36415 COLL VENOUS BLD VENIPUNCTURE: CPT | Performed by: GENERAL PRACTICE

## 2018-09-26 PROCEDURE — G0008 ADMIN INFLUENZA VIRUS VAC: HCPCS | Performed by: GENERAL PRACTICE

## 2018-09-27 LAB
ALBUMIN SERPL-MCNC: 4.5 G/DL (ref 3.4–4.8)
ALBUMIN/GLOB SERPL: 1.7 G/DL (ref 1.5–2.5)
ALP SERPL-CCNC: 44 U/L (ref 40–129)
ALT SERPL-CCNC: 55 U/L (ref 10–44)
AST SERPL-CCNC: 45 U/L (ref 10–34)
BASOPHILS # BLD AUTO: 0.06 10*3/MM3 (ref 0–0.3)
BASOPHILS NFR BLD AUTO: 0.8 % (ref 0–2)
BILIRUB SERPL-MCNC: 0.6 MG/DL (ref 0.2–1.8)
BUN SERPL-MCNC: 20 MG/DL (ref 7–21)
BUN/CREAT SERPL: 19.2 (ref 7–25)
CALCIUM SERPL-MCNC: 9 MG/DL (ref 7.7–10)
CHLORIDE SERPL-SCNC: 107 MMOL/L (ref 99–112)
CHOLEST SERPL-MCNC: 148 MG/DL (ref 0–200)
CO2 SERPL-SCNC: 25.2 MMOL/L (ref 24.3–31.9)
CREAT SERPL-MCNC: 1.04 MG/DL (ref 0.43–1.29)
EOSINOPHIL # BLD AUTO: 0.91 10*3/MM3 (ref 0–0.7)
EOSINOPHIL NFR BLD AUTO: 11.4 % (ref 0–7)
ERYTHROCYTE [DISTWIDTH] IN BLOOD BY AUTOMATED COUNT: 13.4 % (ref 11.5–14.5)
GLOBULIN SER CALC-MCNC: 2.7 GM/DL
GLUCOSE SERPL-MCNC: 135 MG/DL (ref 70–110)
HBA1C MFR BLD: 6.9 % (ref 4.5–5.7)
HCT VFR BLD AUTO: 44.2 % (ref 42–52)
HDLC SERPL-MCNC: 53 MG/DL (ref 60–100)
HGB BLD-MCNC: 15.4 G/DL (ref 14–18)
IMM GRANULOCYTES # BLD: 0.03 10*3/MM3 (ref 0–0.03)
IMM GRANULOCYTES NFR BLD: 0.4 % (ref 0–0.5)
LDLC SERPL CALC-MCNC: 57 MG/DL (ref 0–100)
LYMPHOCYTES # BLD AUTO: 2.6 10*3/MM3 (ref 1–3)
LYMPHOCYTES NFR BLD AUTO: 32.6 % (ref 16–46)
MCH RBC QN AUTO: 30.4 PG (ref 27–33)
MCHC RBC AUTO-ENTMCNC: 34.8 G/DL (ref 33–37)
MCV RBC AUTO: 87.4 FL (ref 80–94)
MICROALBUMIN UR-MCNC: 37.7 UG/ML
MONOCYTES # BLD AUTO: 0.65 10*3/MM3 (ref 0.1–0.9)
MONOCYTES NFR BLD AUTO: 8.1 % (ref 0–12)
NEUTROPHILS # BLD AUTO: 3.73 10*3/MM3 (ref 1.4–6.5)
NEUTROPHILS NFR BLD AUTO: 46.7 % (ref 40–75)
PLATELET # BLD AUTO: 168 10*3/MM3 (ref 130–400)
POTASSIUM SERPL-SCNC: 4.9 MMOL/L (ref 3.5–5.3)
PROT SERPL-MCNC: 7.2 G/DL (ref 6–8)
RBC # BLD AUTO: 5.06 10*6/MM3 (ref 4.7–6.1)
SODIUM SERPL-SCNC: 141 MMOL/L (ref 135–153)
TRIGL SERPL-MCNC: 188 MG/DL (ref 0–150)
VLDLC SERPL CALC-MCNC: 37.6 MG/DL
WBC # BLD AUTO: 7.98 10*3/MM3 (ref 4.5–12.5)

## 2018-11-26 DIAGNOSIS — F51.04 CHRONIC INSOMNIA: ICD-10-CM

## 2018-11-26 RX ORDER — LORAZEPAM 1 MG/1
1 TABLET ORAL NIGHTLY
Qty: 30 TABLET | Refills: 0 | Status: SHIPPED | OUTPATIENT
Start: 2018-11-26 | End: 2018-11-29 | Stop reason: SDUPTHER

## 2018-11-29 ENCOUNTER — OFFICE VISIT (OUTPATIENT)
Dept: FAMILY MEDICINE CLINIC | Facility: CLINIC | Age: 78
End: 2018-11-29

## 2018-11-29 VITALS
OXYGEN SATURATION: 94 % | WEIGHT: 173 LBS | RESPIRATION RATE: 12 BRPM | TEMPERATURE: 98.1 F | BODY MASS INDEX: 30.65 KG/M2 | HEART RATE: 56 BPM | HEIGHT: 63 IN | SYSTOLIC BLOOD PRESSURE: 130 MMHG | DIASTOLIC BLOOD PRESSURE: 70 MMHG

## 2018-11-29 DIAGNOSIS — E78.2 MIXED HYPERLIPIDEMIA: Primary | ICD-10-CM

## 2018-11-29 DIAGNOSIS — K70.0 FATTY LIVER, ALCOHOLIC: ICD-10-CM

## 2018-11-29 DIAGNOSIS — I10 ESSENTIAL HYPERTENSION: ICD-10-CM

## 2018-11-29 DIAGNOSIS — E53.8 VITAMIN B 12 DEFICIENCY: ICD-10-CM

## 2018-11-29 DIAGNOSIS — E11.42 TYPE 2 DIABETES MELLITUS WITH PERIPHERAL NEUROPATHY (HCC): ICD-10-CM

## 2018-11-29 DIAGNOSIS — D36.9 DERMOID CYST: ICD-10-CM

## 2018-11-29 DIAGNOSIS — J30.2 CHRONIC SEASONAL ALLERGIC RHINITIS: ICD-10-CM

## 2018-11-29 DIAGNOSIS — N52.01 ERECTILE DYSFUNCTION DUE TO ARTERIAL INSUFFICIENCY: ICD-10-CM

## 2018-11-29 DIAGNOSIS — J44.9 COPD MIXED TYPE (HCC): ICD-10-CM

## 2018-11-29 DIAGNOSIS — N40.1 BENIGN NON-NODULAR PROSTATIC HYPERPLASIA WITH LOWER URINARY TRACT SYMPTOMS: ICD-10-CM

## 2018-11-29 DIAGNOSIS — F51.04 CHRONIC INSOMNIA: ICD-10-CM

## 2018-11-29 DIAGNOSIS — Z00.00 HEALTHCARE MAINTENANCE: ICD-10-CM

## 2018-11-29 DIAGNOSIS — Z87.891 FORMER SMOKER: ICD-10-CM

## 2018-11-29 PROCEDURE — 99214 OFFICE O/P EST MOD 30 MIN: CPT | Performed by: GENERAL PRACTICE

## 2018-11-29 RX ORDER — GABAPENTIN 600 MG/1
600 TABLET ORAL DAILY
Qty: 30 TABLET | Refills: 3 | Status: SHIPPED | OUTPATIENT
Start: 2018-11-29 | End: 2019-03-28 | Stop reason: SDUPTHER

## 2018-11-29 RX ORDER — SILDENAFIL CITRATE 20 MG/1
TABLET ORAL
Qty: 60 TABLET | Refills: 2 | Status: SHIPPED | OUTPATIENT
Start: 2018-11-29 | End: 2019-03-28 | Stop reason: SDUPTHER

## 2018-11-29 RX ORDER — LORAZEPAM 1 MG/1
1 TABLET ORAL NIGHTLY
Qty: 30 TABLET | Refills: 2 | Status: SHIPPED | OUTPATIENT
Start: 2018-11-29 | End: 2019-03-28 | Stop reason: SDUPTHER

## 2018-11-29 NOTE — PROGRESS NOTES
Subjective   Joselito Merida is a 78 y.o. male.     History of Present Illness     Skin Lesion  He continues to have a swelling over his left mid back. This appeared suddenly and has not changed much since. With the exception of intermittent mild tenderness with pressure and a small amount of drainage he denies any associated symptoms. There's no history of pain otherwise and he denies any fever or chills.    COPD  The patient reports that his SOB and cough have remained stable. He states that the symptoms occur during the day. He reports that his symptoms become worse with activity and exposure to cold air. His symptoms are reduced with rest and with inhaled inhaled medication. The patient states he also has nasal congestion and post nasal drip/clearing throat. He states he is able to walk on level ground with no symptoms but has trouble with inclines, stairs, or lifting. He is following the treatment plan, including medications as directed. He remains on breo generally provided through samples. He has a history of 40 pack years.    Diabetes  Current symptoms include paresthesia of the feet. Patient denies visual disturbances, polydipsia, polyuria, hypoglycemia and foot ulcerations. Evaluation to date has been: hemoglobin A1C. Home sugars: patient does not check sugars. Current treatments: metformin. Last dilated eye exam within one year.   Lab Results   Component Value Date    HGBA1C 6.90 (H) 09/26/2018     Dyslipidemia  Compliance with treatment has been fair. The patient exercises intermittently. He is currently being prescribed the following medication for his dyslipidemia - pravastatin. Patient denies side effects associated with his medications.  Lab Results   Component Value Date    CHOL 156 03/24/2017    CHLPL 148 09/26/2018    TRIG 188 (H) 09/26/2018    HDL 53 (L) 09/26/2018    LDL 57 09/26/2018     Hypertension  Home blood pressure readings: not doing. Associated signs and symptoms: dyspnea. Patient denies:  chest pain, palpitations, orthopnea, paroxysmal nocturnal dyspnea and peripheral edema. Current antihypertensive medications includes lisinopril. Medication compliance: taking as prescribed.   Lab Results   Component Value Date    CREATININE 1.04 09/26/2018     The following portions of the patient's history were reviewed and updated as appropriate: allergies, current medications, past medical history, past social history and problem list.    Review of Systems   Constitutional: Negative for appetite change, chills, fatigue, fever and unexpected weight change.   HENT: Positive for postnasal drip, rhinorrhea and sneezing. Negative for congestion, ear pain, sinus pressure, sore throat and voice change.    Eyes: Negative for visual disturbance.   Respiratory: Positive for cough and shortness of breath. Negative for wheezing.    Cardiovascular: Negative for chest pain, palpitations and leg swelling.   Gastrointestinal: Negative for abdominal pain, blood in stool, constipation, diarrhea, nausea and vomiting.   Endocrine: Negative for polydipsia and polyuria.   Genitourinary: Negative for difficulty urinating, dysuria, frequency, hematuria and urgency.   Musculoskeletal: Negative for arthralgias, back pain, joint swelling, myalgias and neck pain.   Skin: Negative for color change.        Lesion left mid back   Neurological: Positive for numbness. Negative for tremors, weakness and headaches.   Psychiatric/Behavioral: Positive for sleep disturbance. Negative for dysphoric mood and suicidal ideas. The patient is not nervous/anxious.      Objective   Physical Exam   Constitutional: He is oriented to person, place, and time. He appears well-developed and well-nourished. He is cooperative. He does not have a sickly appearance. No distress.   Well in general.  No apparent distress.  No pallor, cyanosis, diaphoresis, or jaundice   HENT:   Head: Atraumatic.   Mouth/Throat: Oropharynx is clear and moist.   Eyes: EOM are normal.  Pupils are equal, round, and reactive to light. No scleral icterus.   Neck: No JVD present. Carotid bruit is not present. No tracheal deviation present. No thyromegaly present.   Cardiovascular: Normal rate, regular rhythm, S1 normal, S2 normal, normal heart sounds and intact distal pulses. Exam reveals no gallop.   No murmur heard.  Pulmonary/Chest: No accessory muscle usage. No respiratory distress. He has decreased breath sounds. He has wheezes. He has no rales.   Pulmonary hyperinflation   Abdominal: Soft. Normal aorta and bowel sounds are normal. He exhibits no abdominal bruit and no mass. There is no hepatosplenomegaly. There is no tenderness. No hernia.   Musculoskeletal: He exhibits no tenderness or deformity.   Lymphadenopathy:        Head (right side): No submandibular adenopathy present.        Head (left side): No submandibular adenopathy present.     He has no cervical adenopathy.   Neurological: He is alert and oriented to person, place, and time. He has normal strength and normal reflexes. He displays normal reflexes. A sensory deficit (decreased vibration sense both feet) is present. No cranial nerve deficit. He exhibits normal muscle tone. Coordination normal.   Skin: Skin is warm and dry. No rash noted. He is not diaphoretic. No pallor. Nails show no clubbing.   2.5 cm oval well demarcated cystic oval mass left mid back. Small amount of scabbing over the inferior aspect. Slightly tender with pressure and a small amount of soft sebaceous material expressed from scabbed area. No fluctuance or warmth. 4-5 mm oval slightly pale slightly pearly and slightly raised papule upper pinna left ear - appears unchanged from previous.   Psychiatric: He has a normal mood and affect. His behavior is normal.     Assessment/Plan   Problems Addressed this Visit        Cardiovascular and Mediastinum    Essential hypertension   Hypertension: at goal. Evidence of target organ damage: none.  Encouraged to continue to  work on diet and exercise plan.   Continue current medication    Mixed hyperlipidemia   As above.   Continue current medication.       Respiratory    COPD mixed type (CMS/HCC)   COPD is improved with smoking cessation.  Encouraged to remain as active as symptoms allow for    Chronic seasonal allergic rhinitis       Digestive    Vitamin B 12 deficiency  Continue maintenance supplementation with monitoring.    Fatty liver, alcoholic       Endocrine    Type 2 diabetes mellitus with peripheral neuropathy (CMS/HCC)  Diabetes mellitus Type II, under excellent control.   Encouraged to continue to pursue ADA diet  Encouraged aerobic exercise.  Updated labs will be drawn at return.    Relevant Medications    gabapentin (NEURONTIN) 600 MG tablet       Genitourinary    Benign non-nodular prostatic hyperplasia with lower urinary tract symptoms    Dermoid cyst  Lower mid back.  Reviewed options.  Patient is not interested in pursuing excision at present but will report if any worse, any new symptoms, or if he should change his mind        Other    Chronic insomnia    Relevant Medications    LORazepam (ATIVAN) 1 MG tablet    Vasculogenic erectile dysfunction    Relevant Medications    sildenafil (REVATIO) 20 MG tablet    Healthcare maintenance  Patient has already received a flu shot fall.  We'll discuss a Tdap and shingrix again at his return     Former smoker

## 2018-12-26 DIAGNOSIS — F51.04 CHRONIC INSOMNIA: ICD-10-CM

## 2018-12-26 RX ORDER — LORAZEPAM 1 MG/1
1 TABLET ORAL NIGHTLY
Qty: 30 TABLET | Refills: 0 | OUTPATIENT
Start: 2018-12-26

## 2019-01-03 DIAGNOSIS — I10 ESSENTIAL HYPERTENSION: ICD-10-CM

## 2019-01-03 DIAGNOSIS — N40.1 BENIGN NON-NODULAR PROSTATIC HYPERPLASIA WITH LOWER URINARY TRACT SYMPTOMS: ICD-10-CM

## 2019-01-03 RX ORDER — LISINOPRIL 10 MG/1
TABLET ORAL
Qty: 30 TABLET | Refills: 2 | Status: SHIPPED | OUTPATIENT
Start: 2019-01-03 | End: 2019-08-06 | Stop reason: SDUPTHER

## 2019-01-03 RX ORDER — TAMSULOSIN HYDROCHLORIDE 0.4 MG/1
CAPSULE ORAL
Qty: 30 CAPSULE | Refills: 2 | Status: SHIPPED | OUTPATIENT
Start: 2019-01-03 | End: 2019-05-07 | Stop reason: SDUPTHER

## 2019-02-05 DIAGNOSIS — E11.42 TYPE 2 DIABETES MELLITUS WITH PERIPHERAL NEUROPATHY (HCC): ICD-10-CM

## 2019-02-06 ENCOUNTER — TELEPHONE (OUTPATIENT)
Dept: FAMILY MEDICINE CLINIC | Facility: CLINIC | Age: 79
End: 2019-02-06

## 2019-02-06 NOTE — TELEPHONE ENCOUNTER
Pt is aware of this information.   ----- Message from Markie Medrano MD sent at 2/6/2019  8:28 AM EST -----  No - we dont do that    ----- Message -----  From: Riya Liang MA  Sent: 2/5/2019  10:49 AM  To: Markie Medrano MD    Patient came by the office and stated that he went fishing the other day and someone stole his ativan prescription. He just got them filled on 01/26/2019. I know his insurance will not cover another prescription this soon however the patient wants to know if you could okay him getting a refill this early.

## 2019-03-06 DIAGNOSIS — E78.49 OTHER HYPERLIPIDEMIA: ICD-10-CM

## 2019-03-06 RX ORDER — PRAVASTATIN SODIUM 40 MG
TABLET ORAL
Qty: 30 TABLET | Refills: 4 | Status: SHIPPED | OUTPATIENT
Start: 2019-03-06 | End: 2019-08-06 | Stop reason: SDUPTHER

## 2019-03-28 ENCOUNTER — OFFICE VISIT (OUTPATIENT)
Dept: FAMILY MEDICINE CLINIC | Facility: CLINIC | Age: 79
End: 2019-03-28

## 2019-03-28 VITALS
DIASTOLIC BLOOD PRESSURE: 65 MMHG | WEIGHT: 174 LBS | RESPIRATION RATE: 12 BRPM | TEMPERATURE: 98.4 F | OXYGEN SATURATION: 98 % | HEIGHT: 63 IN | HEART RATE: 54 BPM | BODY MASS INDEX: 30.83 KG/M2 | SYSTOLIC BLOOD PRESSURE: 130 MMHG

## 2019-03-28 DIAGNOSIS — I10 ESSENTIAL HYPERTENSION: ICD-10-CM

## 2019-03-28 DIAGNOSIS — J44.9 COPD MIXED TYPE (HCC): ICD-10-CM

## 2019-03-28 DIAGNOSIS — N40.1 BENIGN NON-NODULAR PROSTATIC HYPERPLASIA WITH LOWER URINARY TRACT SYMPTOMS: ICD-10-CM

## 2019-03-28 DIAGNOSIS — F51.04 CHRONIC INSOMNIA: ICD-10-CM

## 2019-03-28 DIAGNOSIS — E78.2 MIXED HYPERLIPIDEMIA: Primary | ICD-10-CM

## 2019-03-28 DIAGNOSIS — Z87.891 FORMER SMOKER: ICD-10-CM

## 2019-03-28 DIAGNOSIS — E11.42 TYPE 2 DIABETES MELLITUS WITH PERIPHERAL NEUROPATHY (HCC): ICD-10-CM

## 2019-03-28 DIAGNOSIS — J30.2 CHRONIC SEASONAL ALLERGIC RHINITIS: ICD-10-CM

## 2019-03-28 DIAGNOSIS — Z00.00 HEALTHCARE MAINTENANCE: ICD-10-CM

## 2019-03-28 DIAGNOSIS — N52.01 ERECTILE DYSFUNCTION DUE TO ARTERIAL INSUFFICIENCY: ICD-10-CM

## 2019-03-28 DIAGNOSIS — E53.8 VITAMIN B 12 DEFICIENCY: ICD-10-CM

## 2019-03-28 PROCEDURE — 99214 OFFICE O/P EST MOD 30 MIN: CPT | Performed by: GENERAL PRACTICE

## 2019-03-28 RX ORDER — LORAZEPAM 1 MG/1
1 TABLET ORAL NIGHTLY
Qty: 30 TABLET | Refills: 3 | Status: SHIPPED | OUTPATIENT
Start: 2019-03-28 | End: 2019-06-28 | Stop reason: SDUPTHER

## 2019-03-28 RX ORDER — GABAPENTIN 600 MG/1
600 TABLET ORAL DAILY
Qty: 30 TABLET | Refills: 3 | Status: SHIPPED | OUTPATIENT
Start: 2019-03-28 | End: 2019-06-28 | Stop reason: SDUPTHER

## 2019-03-28 RX ORDER — SILDENAFIL CITRATE 20 MG/1
TABLET ORAL
Qty: 60 TABLET | Refills: 5 | Status: SHIPPED | OUTPATIENT
Start: 2019-03-28 | End: 2019-12-12 | Stop reason: SDUPTHER

## 2019-03-28 NOTE — PROGRESS NOTES
Subjective   Joselito Merida is a 78 y.o. male.     History of Present Illness     COPD  The patient reports that his SOB and cough were worse over the last several weeks but had been returning to baseline.  Over this time he has had increased nasal congestion and postnasal drip. There is no history of any other upper respiratory tract symptoms and he denies any chest pain, hemoptysis, fever or chills. He states he is able to walk on level ground with no symptoms but has trouble with inclines, stairs, or lifting. He is following the treatment plan, including medications as directed. He remains on breo generally provided through samples. He has a history of 40 pack years.    Diabetes  Current symptoms include paresthesia of the feet. Patient denies visual disturbances, polydipsia, polyuria, hypoglycemia and foot ulcerations. Evaluation to date has been: hemoglobin A1C. Home sugars: patient does not check sugars. Current treatments: metformin. Last dilated eye exam within one year.  He has had no recent labs    Dyslipidemia  Compliance with treatment has been fair. The patient exercises intermittently. He is currently being prescribed the following medication for his dyslipidemia - pravastatin. Patient denies side effects associated with his medications.    Hypertension  Home blood pressure readings: not doing. Associated signs and symptoms: dyspnea. Patient denies: chest pain, palpitations, orthopnea, paroxysmal nocturnal dyspnea and peripheral edema. Current antihypertensive medications includes lisinopril. Medication compliance: taking as prescribed.     The following portions of the patient's history were reviewed and updated as appropriate: allergies, current medications, past medical history, past social history and problem list.    Review of Systems   Constitutional: Negative for appetite change, chills, fatigue, fever and unexpected weight change.   HENT: Positive for postnasal drip, rhinorrhea and sneezing.  Negative for congestion, ear pain, sinus pressure, sore throat and voice change.    Eyes: Negative for visual disturbance.   Respiratory: Positive for cough and shortness of breath. Negative for wheezing.    Cardiovascular: Negative for chest pain, palpitations and leg swelling.   Gastrointestinal: Negative for abdominal pain, blood in stool, constipation, diarrhea, nausea and vomiting.   Endocrine: Negative for polydipsia and polyuria.   Genitourinary: Negative for difficulty urinating, dysuria, frequency, hematuria and urgency.   Musculoskeletal: Negative for arthralgias, back pain, joint swelling, myalgias and neck pain.   Skin: Negative for color change.        Lesion left mid back   Neurological: Positive for numbness. Negative for tremors, weakness and headaches.   Psychiatric/Behavioral: Positive for sleep disturbance. Negative for dysphoric mood and suicidal ideas. The patient is not nervous/anxious.      Objective   Physical Exam   Constitutional: He is oriented to person, place, and time. He appears well-developed and well-nourished. He is cooperative. He does not have a sickly appearance. No distress.   Well in general.  No apparent distress.  No pallor, cyanosis, diaphoresis, or jaundice   HENT:   Head: Atraumatic.   Mouth/Throat: Oropharynx is clear and moist.   Eyes: EOM are normal. Pupils are equal, round, and reactive to light. No scleral icterus.   Neck: No JVD present. Carotid bruit is not present. No tracheal deviation present. No thyromegaly present.   Cardiovascular: Normal rate, regular rhythm, S1 normal, S2 normal, normal heart sounds and intact distal pulses. Exam reveals no gallop.   No murmur heard.  Pulmonary/Chest: No accessory muscle usage. No respiratory distress. He has decreased breath sounds. He has wheezes. He has no rales.   Pulmonary hyperinflation   Abdominal: Soft. Normal aorta and bowel sounds are normal. He exhibits no abdominal bruit and no mass. There is no  hepatosplenomegaly. There is no tenderness. No hernia.   Musculoskeletal: He exhibits no tenderness or deformity.   Lymphadenopathy:        Head (right side): No submandibular adenopathy present.        Head (left side): No submandibular adenopathy present.     He has no cervical adenopathy.   Neurological: He is alert and oriented to person, place, and time. He has normal strength and normal reflexes. He displays normal reflexes. A sensory deficit (decreased vibration sense both feet) is present. No cranial nerve deficit. He exhibits normal muscle tone. Coordination normal.   Skin: Skin is warm and dry. No rash noted. He is not diaphoretic. No pallor. Nails show no clubbing.   Psychiatric: He has a normal mood and affect. His behavior is normal.     Assessment/Plan   Problems Addressed this Visit        Cardiovascular and Mediastinum    Essential hypertension   Hypertension: at goal. Evidence of target organ damage: none.  Encouraged to continue to work on diet and exercise plan.   Continue current medication  Updated labs drawn.    Relevant Orders    CBC & Differential    Comprehensive Metabolic Panel    Mixed hyperlipidemia   As above.   Continue current medication.    Relevant Orders    Lipid Panel    TSH       Respiratory    COPD mixed type (CMS/HCC)   COPD is stable.  Encouraged to remain off cigarettes  Encouraged to remain as active as symptoms allow for  Continue current medication    Chronic seasonal allergic rhinitis       Digestive    Vitamin B 12 deficiency  Continue maintenance supplementation with monitoring.    Relevant Orders    Vitamin B12       Endocrine    Type 2 diabetes mellitus with peripheral neuropathy (CMS/HCC)  Diabetes mellitus Type II, under excellent control.   Encouraged to continue to pursue ADA diet  Encouraged aerobic exercise.  Continue current medication    Relevant Medications    gabapentin (NEURONTIN) 600 MG tablet    Other Relevant Orders    Hemoglobin A1c    MicroAlbumin,  Urine, Random -       Genitourinary    Benign non-nodular prostatic hyperplasia with lower urinary tract symptoms       Other    Chronic insomnia  Continue current medication    Relevant Medications    LORazepam (ATIVAN) 1 MG tablet    Vasculogenic erectile dysfunction    Relevant Medications    sildenafil (REVATIO) 20 MG tablet    Healthcare maintenance  Recommended an updated Tdap along with the Shingrix -prescriptions written for each    Relevant Medications    Tdap (BOOSTRIX) 5-2.5-18.5 LF-MCG/0.5 injection    Zoster Vac Recomb Adjuvanted (SHINGRIX) 50 MCG/0.5ML reconstituted suspension    Former smoker

## 2019-04-01 LAB
ALBUMIN SERPL-MCNC: 4.4 G/DL (ref 3.5–5.2)
ALBUMIN/GLOB SERPL: 1.6 G/DL
ALP SERPL-CCNC: 46 U/L (ref 39–117)
ALT SERPL-CCNC: 52 U/L (ref 1–41)
AST SERPL-CCNC: 47 U/L (ref 1–40)
BASOPHILS # BLD AUTO: 0.03 10*3/MM3 (ref 0–0.2)
BASOPHILS NFR BLD AUTO: 0.5 % (ref 0–1.5)
BILIRUB SERPL-MCNC: 0.4 MG/DL (ref 0.2–1.2)
BUN SERPL-MCNC: 21 MG/DL (ref 8–23)
BUN/CREAT SERPL: 19.6 (ref 7–25)
CALCIUM SERPL-MCNC: 8.9 MG/DL (ref 8.6–10.5)
CHLORIDE SERPL-SCNC: 102 MMOL/L (ref 98–107)
CHOLEST SERPL-MCNC: 132 MG/DL (ref 0–200)
CO2 SERPL-SCNC: 23.1 MMOL/L (ref 22–29)
CREAT SERPL-MCNC: 1.07 MG/DL (ref 0.76–1.27)
EOSINOPHIL # BLD AUTO: 0.25 10*3/MM3 (ref 0–0.4)
EOSINOPHIL NFR BLD AUTO: 4.1 % (ref 0.3–6.2)
ERYTHROCYTE [DISTWIDTH] IN BLOOD BY AUTOMATED COUNT: 14.1 % (ref 12.3–15.4)
GLOBULIN SER CALC-MCNC: 2.8 GM/DL
GLUCOSE SERPL-MCNC: 190 MG/DL (ref 65–99)
HBA1C MFR BLD: 6.7 % (ref 4.8–5.6)
HCT VFR BLD AUTO: 46 % (ref 37.5–51)
HDLC SERPL-MCNC: 54 MG/DL (ref 40–60)
HGB BLD-MCNC: 14.8 G/DL (ref 13–17.7)
IMM GRANULOCYTES # BLD AUTO: 0.02 10*3/MM3 (ref 0–0.05)
IMM GRANULOCYTES NFR BLD AUTO: 0.3 % (ref 0–0.5)
LDLC SERPL CALC-MCNC: 39 MG/DL (ref 0–100)
LYMPHOCYTES # BLD AUTO: 1.59 10*3/MM3 (ref 0.7–3.1)
LYMPHOCYTES NFR BLD AUTO: 26.1 % (ref 19.6–45.3)
MCH RBC QN AUTO: 28.4 PG (ref 26.6–33)
MCHC RBC AUTO-ENTMCNC: 32.2 G/DL (ref 31.5–35.7)
MCV RBC AUTO: 88.1 FL (ref 79–97)
MICROALBUMIN UR-MCNC: 74.8 UG/ML
MONOCYTES # BLD AUTO: 0.48 10*3/MM3 (ref 0.1–0.9)
MONOCYTES NFR BLD AUTO: 7.9 % (ref 5–12)
NEUTROPHILS # BLD AUTO: 3.73 10*3/MM3 (ref 1.4–7)
NEUTROPHILS NFR BLD AUTO: 61.1 % (ref 42.7–76)
NRBC BLD AUTO-RTO: 0 /100 WBC (ref 0–0)
PLATELET # BLD AUTO: 197 10*3/MM3 (ref 140–450)
POTASSIUM SERPL-SCNC: 4.6 MMOL/L (ref 3.5–5.2)
PROT SERPL-MCNC: 7.2 G/DL (ref 6–8.5)
RBC # BLD AUTO: 5.22 10*6/MM3 (ref 4.14–5.8)
SODIUM SERPL-SCNC: 142 MMOL/L (ref 136–145)
TESTOST FREE SERPL-MCNC: 3.5 PG/ML (ref 6.6–18.1)
TESTOST SERPL-MCNC: 247.4 NG/DL (ref 264–916)
TRIGL SERPL-MCNC: 197 MG/DL (ref 0–150)
TSH SERPL DL<=0.005 MIU/L-ACNC: 2.78 UIU/ML (ref 0.45–4.5)
VIT B12 SERPL-MCNC: 402 PG/ML (ref 211–946)
VLDLC SERPL CALC-MCNC: 39.4 MG/DL (ref 5–40)
WBC # BLD AUTO: 6.1 10*3/MM3 (ref 3.4–10.8)

## 2019-05-07 DIAGNOSIS — N40.1 BENIGN NON-NODULAR PROSTATIC HYPERPLASIA WITH LOWER URINARY TRACT SYMPTOMS: ICD-10-CM

## 2019-05-07 RX ORDER — TAMSULOSIN HYDROCHLORIDE 0.4 MG/1
CAPSULE ORAL
Qty: 30 CAPSULE | Refills: 3 | Status: SHIPPED | OUTPATIENT
Start: 2019-05-07 | End: 2019-09-16 | Stop reason: SDUPTHER

## 2019-06-28 ENCOUNTER — OFFICE VISIT (OUTPATIENT)
Dept: FAMILY MEDICINE CLINIC | Facility: CLINIC | Age: 79
End: 2019-06-28

## 2019-06-28 DIAGNOSIS — J44.9 COPD MIXED TYPE (HCC): ICD-10-CM

## 2019-06-28 DIAGNOSIS — Z00.00 HEALTHCARE MAINTENANCE: ICD-10-CM

## 2019-06-28 DIAGNOSIS — I10 ESSENTIAL HYPERTENSION: ICD-10-CM

## 2019-06-28 DIAGNOSIS — N40.1 BENIGN NON-NODULAR PROSTATIC HYPERPLASIA WITH LOWER URINARY TRACT SYMPTOMS: ICD-10-CM

## 2019-06-28 DIAGNOSIS — J30.2 CHRONIC SEASONAL ALLERGIC RHINITIS: ICD-10-CM

## 2019-06-28 DIAGNOSIS — E78.2 MIXED HYPERLIPIDEMIA: Primary | ICD-10-CM

## 2019-06-28 DIAGNOSIS — N52.01 ERECTILE DYSFUNCTION DUE TO ARTERIAL INSUFFICIENCY: ICD-10-CM

## 2019-06-28 DIAGNOSIS — F51.04 CHRONIC INSOMNIA: ICD-10-CM

## 2019-06-28 DIAGNOSIS — E11.42 TYPE 2 DIABETES MELLITUS WITH PERIPHERAL NEUROPATHY (HCC): ICD-10-CM

## 2019-06-28 DIAGNOSIS — E53.8 VITAMIN B 12 DEFICIENCY: ICD-10-CM

## 2019-06-28 DIAGNOSIS — Z87.891 FORMER SMOKER: ICD-10-CM

## 2019-06-28 PROCEDURE — 99214 OFFICE O/P EST MOD 30 MIN: CPT | Performed by: GENERAL PRACTICE

## 2019-06-28 RX ORDER — GABAPENTIN 600 MG/1
600 TABLET ORAL DAILY
Qty: 30 TABLET | Refills: 3 | Status: SHIPPED | OUTPATIENT
Start: 2019-06-28 | End: 2019-10-25 | Stop reason: SDUPTHER

## 2019-06-28 RX ORDER — LORAZEPAM 1 MG/1
1 TABLET ORAL NIGHTLY
Qty: 30 TABLET | Refills: 3 | Status: SHIPPED | OUTPATIENT
Start: 2019-06-28 | End: 2019-10-25 | Stop reason: SDUPTHER

## 2019-06-28 NOTE — PROGRESS NOTES
Subjective   Joselito Merida is a 78 y.o. male.     History of Present Illness     COPD  The patient reports that his SOB and cough have remained at baseline since last here.  Continues to have intermittent nasal congestion and a postnasal drip. There is no history of any other upper respiratory tract symptoms and he denies any chest pain, hemoptysis, fever or chills. He states he is able to walk on level ground with no symptoms but has trouble with inclines, stairs, or lifting. He is following the treatment plan, including medications as directed. He remains on breo generally provided through samples. He has a history of 40 pack years.    Diabetes  Current symptoms include paresthesia of the feet. Patient denies visual disturbances, polydipsia, polyuria, hypoglycemia and foot ulcerations. Evaluation to date has been: hemoglobin A1C. Home sugars: patient does not check sugars. Current treatments: metformin. Last dilated eye exam within one year.    Lab Results   Component Value Date    HGBA1C 6.70 (H) 03/28/2019     Dyslipidemia  Compliance with treatment has been fair. The patient exercises intermittently. He is currently being prescribed the following medication for his dyslipidemia - pravastatin. Patient denies side effects associated with his medications.  Lab Results   Component Value Date    CHOL 156 03/24/2017    CHLPL 132 03/28/2019    TRIG 197 (H) 03/28/2019    HDL 54 03/28/2019    LDL 39 03/28/2019     Hypertension  Home blood pressure readings: not doing. Associated signs and symptoms: dyspnea. Patient denies: chest pain, palpitations, orthopnea, paroxysmal nocturnal dyspnea and peripheral edema. Current antihypertensive medications includes lisinopril. Medication compliance: taking as prescribed.   Lab Results   Component Value Date    CREATININE 1.07 03/28/2019     Lab Results   Component Value Date    K 4.6 03/28/2019     The following portions of the patient's history were reviewed and updated as  appropriate: allergies, current medications, past medical history, past social history and problem list.    Review of Systems   Constitutional: Negative for appetite change, chills, fatigue, fever and unexpected weight change.   HENT: Positive for postnasal drip, rhinorrhea and sneezing. Negative for congestion, ear pain, sinus pressure, sore throat and voice change.    Eyes: Negative for visual disturbance.   Respiratory: Positive for cough and shortness of breath. Negative for wheezing.    Cardiovascular: Negative for chest pain, palpitations and leg swelling.   Gastrointestinal: Negative for abdominal pain, blood in stool, constipation, diarrhea, nausea and vomiting.   Endocrine: Negative for polydipsia and polyuria.   Genitourinary: Negative for difficulty urinating, dysuria, frequency, hematuria and urgency.   Musculoskeletal: Negative for arthralgias, back pain, joint swelling, myalgias and neck pain.   Skin: Negative for color change.   Neurological: Positive for numbness. Negative for tremors, weakness and headaches.   Psychiatric/Behavioral: Positive for sleep disturbance. Negative for dysphoric mood and suicidal ideas. The patient is not nervous/anxious.      Objective   Physical Exam   Constitutional: He is oriented to person, place, and time. He appears well-developed and well-nourished. He is cooperative. He does not have a sickly appearance. No distress.   Well in general.  No apparent distress.  No pallor, cyanosis, diaphoresis, or jaundice   HENT:   Head: Atraumatic.   Mouth/Throat: Oropharynx is clear and moist.   Eyes: EOM are normal. Pupils are equal, round, and reactive to light. No scleral icterus.   Neck: No JVD present. Carotid bruit is not present. No tracheal deviation present. No thyromegaly present.   Cardiovascular: Normal rate, regular rhythm, S1 normal, S2 normal, normal heart sounds and intact distal pulses. Exam reveals no gallop.   No murmur heard.  Pulmonary/Chest: No accessory  muscle usage. No respiratory distress. He has decreased breath sounds. He has wheezes. He has no rales.   Pulmonary hyperinflation   Abdominal: Soft. Normal aorta and bowel sounds are normal. He exhibits no abdominal bruit and no mass. There is no hepatosplenomegaly. There is no tenderness. No hernia.   Musculoskeletal: He exhibits no tenderness or deformity.   Lymphadenopathy:        Head (right side): No submandibular adenopathy present.        Head (left side): No submandibular adenopathy present.     He has no cervical adenopathy.   Neurological: He is alert and oriented to person, place, and time. He has normal strength and normal reflexes. He displays normal reflexes. A sensory deficit (decreased vibration sense both feet) is present. No cranial nerve deficit. He exhibits normal muscle tone. Coordination normal.   Skin: Skin is warm and dry. No rash noted. He is not diaphoretic. No pallor. Nails show no clubbing.   Psychiatric: He has a normal mood and affect. His behavior is normal.     Assessment/Plan   Problems Addressed this Visit        Cardiovascular and Mediastinum    Essential hypertension   Hypertension: at goal. Evidence of target organ damage: none.  Encouraged to continue to work on diet and exercise plan.   Continue current medication    Mixed hyperlipidemia   As above.   Continue current medication.       Respiratory    COPD mixed type (CMS/HCC)   COPD is stable.  Reminded of the importance of smoking cessation  Encouraged to remain as active as symptoms allow for    Chronic seasonal allergic rhinitis       Digestive    Vitamin B 12 deficiency  Continue supplementation with monitoring.       Endocrine    Type 2 diabetes mellitus with peripheral neuropathy (CMS/HCC)  Diabetes mellitus Type II, under excellent control.   Encouraged to continue to pursue ADA diet  Encouraged aerobic exercise.  Continue current medication  Updated labs will be drawn at his return.    Relevant Medications     gabapentin (NEURONTIN) 600 MG tablet       Genitourinary    Benign non-nodular prostatic hyperplasia with lower urinary tract symptoms       Other    Chronic insomnia    Relevant Medications    LORazepam (ATIVAN) 1 MG tablet    Vasculogenic erectile dysfunction    Healthcare maintenance  Reminded to get a flu shot when available.    Former smoker

## 2019-06-29 VITALS
OXYGEN SATURATION: 94 % | RESPIRATION RATE: 12 BRPM | SYSTOLIC BLOOD PRESSURE: 120 MMHG | DIASTOLIC BLOOD PRESSURE: 65 MMHG | HEIGHT: 63 IN | WEIGHT: 172 LBS | HEART RATE: 51 BPM | BODY MASS INDEX: 30.48 KG/M2 | TEMPERATURE: 97.5 F

## 2019-07-24 DIAGNOSIS — F51.04 CHRONIC INSOMNIA: ICD-10-CM

## 2019-07-24 DIAGNOSIS — E11.42 TYPE 2 DIABETES MELLITUS WITH PERIPHERAL NEUROPATHY (HCC): ICD-10-CM

## 2019-07-24 RX ORDER — LORAZEPAM 1 MG/1
1 TABLET ORAL NIGHTLY
Qty: 30 TABLET | Refills: 3 | OUTPATIENT
Start: 2019-07-24

## 2019-07-24 RX ORDER — GABAPENTIN 600 MG/1
TABLET ORAL
Qty: 30 TABLET | Refills: 3 | OUTPATIENT
Start: 2019-07-24

## 2019-07-27 DIAGNOSIS — J30.9 CHRONIC ALLERGIC RHINITIS: ICD-10-CM

## 2019-07-27 DIAGNOSIS — F51.04 CHRONIC INSOMNIA: ICD-10-CM

## 2019-07-27 RX ORDER — HYDROXYZINE PAMOATE 50 MG/1
CAPSULE ORAL
Qty: 120 CAPSULE | Refills: 5 | Status: SHIPPED | OUTPATIENT
Start: 2019-07-27 | End: 2020-10-19

## 2019-08-06 DIAGNOSIS — E78.49 OTHER HYPERLIPIDEMIA: ICD-10-CM

## 2019-08-06 DIAGNOSIS — E11.42 TYPE 2 DIABETES MELLITUS WITH PERIPHERAL NEUROPATHY (HCC): ICD-10-CM

## 2019-08-06 DIAGNOSIS — I10 ESSENTIAL HYPERTENSION: ICD-10-CM

## 2019-08-06 DIAGNOSIS — F51.04 CHRONIC INSOMNIA: ICD-10-CM

## 2019-08-06 RX ORDER — LISINOPRIL 10 MG/1
TABLET ORAL
Qty: 30 TABLET | Refills: 2 | Status: SHIPPED | OUTPATIENT
Start: 2019-08-06 | End: 2019-10-25 | Stop reason: SDUPTHER

## 2019-08-06 RX ORDER — PRAVASTATIN SODIUM 40 MG
TABLET ORAL
Qty: 30 TABLET | Refills: 4 | Status: SHIPPED | OUTPATIENT
Start: 2019-08-06 | End: 2019-10-25 | Stop reason: SDUPTHER

## 2019-08-06 RX ORDER — LORAZEPAM 1 MG/1
1 TABLET ORAL NIGHTLY
Qty: 30 TABLET | Refills: 3 | OUTPATIENT
Start: 2019-08-06

## 2019-09-16 DIAGNOSIS — N40.1 BENIGN NON-NODULAR PROSTATIC HYPERPLASIA WITH LOWER URINARY TRACT SYMPTOMS: ICD-10-CM

## 2019-09-16 RX ORDER — TAMSULOSIN HYDROCHLORIDE 0.4 MG/1
CAPSULE ORAL
Qty: 30 CAPSULE | Refills: 3 | Status: SHIPPED | OUTPATIENT
Start: 2019-09-16 | End: 2019-10-25 | Stop reason: SDUPTHER

## 2019-10-25 ENCOUNTER — OFFICE VISIT (OUTPATIENT)
Dept: FAMILY MEDICINE CLINIC | Facility: CLINIC | Age: 79
End: 2019-10-25

## 2019-10-25 DIAGNOSIS — J30.2 CHRONIC SEASONAL ALLERGIC RHINITIS: ICD-10-CM

## 2019-10-25 DIAGNOSIS — N52.01 ERECTILE DYSFUNCTION DUE TO ARTERIAL INSUFFICIENCY: ICD-10-CM

## 2019-10-25 DIAGNOSIS — Z00.00 HEALTHCARE MAINTENANCE: ICD-10-CM

## 2019-10-25 DIAGNOSIS — F51.04 CHRONIC INSOMNIA: ICD-10-CM

## 2019-10-25 DIAGNOSIS — E78.49 OTHER HYPERLIPIDEMIA: ICD-10-CM

## 2019-10-25 DIAGNOSIS — E53.8 VITAMIN B 12 DEFICIENCY: ICD-10-CM

## 2019-10-25 DIAGNOSIS — M81.0 AGE-RELATED OSTEOPOROSIS WITHOUT CURRENT PATHOLOGICAL FRACTURE: ICD-10-CM

## 2019-10-25 DIAGNOSIS — N40.1 BENIGN NON-NODULAR PROSTATIC HYPERPLASIA WITH LOWER URINARY TRACT SYMPTOMS: ICD-10-CM

## 2019-10-25 DIAGNOSIS — E78.2 MIXED HYPERLIPIDEMIA: Primary | ICD-10-CM

## 2019-10-25 DIAGNOSIS — J44.9 COPD MIXED TYPE (HCC): ICD-10-CM

## 2019-10-25 DIAGNOSIS — K70.0 FATTY LIVER, ALCOHOLIC: ICD-10-CM

## 2019-10-25 DIAGNOSIS — E11.42 TYPE 2 DIABETES MELLITUS WITH PERIPHERAL NEUROPATHY (HCC): ICD-10-CM

## 2019-10-25 DIAGNOSIS — H61.23 BILATERAL IMPACTED CERUMEN: ICD-10-CM

## 2019-10-25 DIAGNOSIS — I10 ESSENTIAL HYPERTENSION: ICD-10-CM

## 2019-10-25 DIAGNOSIS — Z87.891 FORMER SMOKER: ICD-10-CM

## 2019-10-25 PROCEDURE — 99214 OFFICE O/P EST MOD 30 MIN: CPT | Performed by: GENERAL PRACTICE

## 2019-10-25 PROCEDURE — 36415 COLL VENOUS BLD VENIPUNCTURE: CPT | Performed by: GENERAL PRACTICE

## 2019-10-25 RX ORDER — PRAVASTATIN SODIUM 40 MG
40 TABLET ORAL DAILY
Qty: 30 TABLET | Refills: 5 | Status: SHIPPED | OUTPATIENT
Start: 2019-10-25 | End: 2020-01-06

## 2019-10-25 RX ORDER — TRIAMCINOLONE ACETONIDE 1 MG/G
CREAM TOPICAL 2 TIMES DAILY
Qty: 80 G | Refills: 5
Start: 2019-10-25 | End: 2020-02-25 | Stop reason: SDUPTHER

## 2019-10-25 RX ORDER — TAMSULOSIN HYDROCHLORIDE 0.4 MG/1
1 CAPSULE ORAL
Qty: 30 CAPSULE | Refills: 5 | Status: SHIPPED | OUTPATIENT
Start: 2019-10-25 | End: 2020-05-12 | Stop reason: SDUPTHER

## 2019-10-25 RX ORDER — LISINOPRIL 10 MG/1
10 TABLET ORAL DAILY
Qty: 30 TABLET | Refills: 5 | Status: SHIPPED | OUTPATIENT
Start: 2019-10-25 | End: 2020-05-05

## 2019-10-25 RX ORDER — GABAPENTIN 600 MG/1
600 TABLET ORAL DAILY
Qty: 30 TABLET | Refills: 3 | Status: SHIPPED | OUTPATIENT
Start: 2019-10-25 | End: 2020-02-20

## 2019-10-25 RX ORDER — LORAZEPAM 1 MG/1
1 TABLET ORAL NIGHTLY
Qty: 30 TABLET | Refills: 3 | Status: SHIPPED | OUTPATIENT
Start: 2019-10-25 | End: 2020-02-20

## 2019-10-25 NOTE — PROGRESS NOTES
Subjective   Joselito Merida is a 79 y.o. male.     History of Present Illness     COPD  The patient reports that his SOB and cough have remained at baseline since last here.  He continues to have intermittent nasal congestion and a postnasal drip. There is no history of any other upper respiratory tract symptoms and he denies any chest pain, hemoptysis, fever or chills. He states he is able to walk on level ground with no symptoms but has trouble with inclines, stairs, or lifting. He is following the treatment plan, including medications as directed. He remains on breo generally provided through samples.  He has not required a rescue inhaler for some time . He has a history of 40 pack years.    Diabetes  Current symptoms include paresthesia of the feet. Patient denies visual disturbances, polydipsia, polyuria, hypoglycemia and foot ulcerations. Evaluation to date has been: hemoglobin A1C. Home sugars: patient does not check sugars. Current treatments: metformin. Last dilated eye exam within one year.  He has had no recent labs    Dyslipidemia  Compliance with treatment has been fair. The patient exercises intermittently. He is currently being prescribed the following medication for his dyslipidemia - pravastatin. Patient denies side effects associated with his medications.    Hypertension  Home blood pressure readings: not doing. Associated signs and symptoms: dyspnea. Patient denies: chest pain, palpitations, orthopnea, paroxysmal nocturnal dyspnea and peripheral edema. Current antihypertensive medications includes lisinopril. Medication compliance: taking as prescribed.     Benign Prostatic Hypertrophy  He admits to nocturia x 0-2 with a mild decrease in stream.  He denies any hesitancy or any sense of incomplete voiding and has had no dysuria or hematuria.  He remains on tamsulosin with no apparent side effects    The following portions of the patient's history were reviewed and updated as appropriate: allergies,  current medications, past medical history, past social history and problem list.    Review of Systems   Constitutional: Negative for appetite change, chills, fatigue, fever and unexpected weight change.   HENT: Positive for postnasal drip, rhinorrhea and sneezing. Negative for congestion, ear pain, sinus pressure, sore throat and voice change.    Eyes: Negative for visual disturbance.   Respiratory: Positive for cough and shortness of breath. Negative for wheezing.    Cardiovascular: Negative for chest pain, palpitations and leg swelling.   Gastrointestinal: Negative for abdominal pain, blood in stool, constipation, diarrhea, nausea and vomiting.   Endocrine: Negative for polydipsia and polyuria.   Genitourinary: Negative for difficulty urinating, dysuria, frequency, hematuria and urgency.        Nocturia with decreased stream   Musculoskeletal: Negative for arthralgias, back pain, joint swelling, myalgias and neck pain.   Skin: Negative for color change.   Neurological: Positive for numbness. Negative for tremors, weakness and headaches.   Psychiatric/Behavioral: Positive for sleep disturbance. Negative for dysphoric mood and suicidal ideas. The patient is not nervous/anxious.      Objective   Physical Exam   Constitutional: He is oriented to person, place, and time. He appears well-developed and well-nourished. He is cooperative. He does not have a sickly appearance. No distress.   Well in general.  No apparent distress.  No pallor, cyanosis, diaphoresis, or jaundice   HENT:   Head: Atraumatic.   Right Ear: External ear normal.   Left Ear: External ear normal.   Mouth/Throat: Oropharynx is clear and moist.   Bilateral cerumen impaction   Eyes: EOM are normal. Pupils are equal, round, and reactive to light. No scleral icterus.   Neck: No JVD present. Carotid bruit is not present. No tracheal deviation present. No thyromegaly present.   Cardiovascular: Normal rate, regular rhythm, S1 normal, S2 normal, normal heart  sounds and intact distal pulses. Exam reveals no gallop.   No murmur heard.  Pulmonary/Chest: No accessory muscle usage. No respiratory distress. He has decreased breath sounds. He has wheezes. He has no rales.   Pulmonary hyperinflation   Abdominal: Soft. Normal aorta and bowel sounds are normal. He exhibits no abdominal bruit and no mass. There is no hepatosplenomegaly. There is no tenderness. No hernia.   Musculoskeletal: He exhibits no tenderness or deformity.   Lymphadenopathy:        Head (right side): No submandibular adenopathy present.        Head (left side): No submandibular adenopathy present.     He has no cervical adenopathy.   Neurological: He is alert and oriented to person, place, and time. He has normal strength and normal reflexes. He displays normal reflexes. A sensory deficit (decreased vibration sense both feet) is present. No cranial nerve deficit. He exhibits normal muscle tone. Coordination normal.   Skin: Skin is warm and dry. No rash noted. He is not diaphoretic. No pallor. Nails show no clubbing.   Psychiatric: He has a normal mood and affect. His behavior is normal.     Assessment/Plan   Problems Addressed this Visit        Cardiovascular and Mediastinum    Essential hypertension   Hypertension: at goal. Evidence of target organ damage: none.  Encouraged to continue to work on diet and exercise plan.   Continue current medication  Updated labs drawn.    Relevant Medications    lisinopril (PRINIVIL,ZESTRIL) 10 MG tablet    Other Relevant Orders    Comprehensive Metabolic Panel (Completed)    Mixed hyperlipidemia   As above.   Continue current medication.    Relevant Medications    pravastatin (PRAVACHOL) 40 MG tablet    Other Relevant Orders    Lipid Panel (Completed)       Respiratory    COPD mixed type (CMS/HCC)   COPD is stable.  Encouraged to remain as active as symptoms allow for  Continue current medication    Chronic seasonal allergic rhinitis       Digestive    Vitamin B 12  deficiency  Continue supplementation with monitoring.    Relevant Orders    CBC & Differential (Completed)    Vitamin B12 (Completed)    Fatty liver, alcoholic  Will continue to monitor       Endocrine    Type 2 diabetes mellitus with peripheral neuropathy (CMS/HCC)  Diabetes mellitus Type II, under excellent control.   Encouraged to continue to pursue ADA diet  Encouraged aerobic exercise.  Continue current medication    Relevant Medications    metFORMIN (GLUCOPHAGE) 500 MG tablet    gabapentin (NEURONTIN) 600 MG tablet    Other Relevant Orders    Hemoglobin A1c (Completed)    MicroAlbumin, Urine, Random - Urine, Clean Catch       Nervous and Auditory    Bilateral impacted cerumen  Reviewed options and patient wished to proceed with lavage    Relevant Orders    Ear Cerumen Removal Lavage (Completed)       Genitourinary    Benign non-nodular prostatic hyperplasia with lower urinary tract symptoms  Stable  Continue current medication    Relevant Medications    tamsulosin (FLOMAX) 0.4 MG capsule 24 hr capsule       Other    Chronic insomnia    Relevant Medications    LORazepam (ATIVAN) 1 MG tablet    Vasculogenic erectile dysfunction    Healthcare maintenance  Patient will obtain a flu shot through his pharmacy  We will discuss an updated colonoscopy at his return    Relevant Orders    Vitamin D 25 Hydroxy (Completed)    Former smoker

## 2019-10-26 VITALS
TEMPERATURE: 98.6 F | HEART RATE: 56 BPM | SYSTOLIC BLOOD PRESSURE: 120 MMHG | RESPIRATION RATE: 12 BRPM | BODY MASS INDEX: 30.48 KG/M2 | OXYGEN SATURATION: 94 % | DIASTOLIC BLOOD PRESSURE: 70 MMHG | HEIGHT: 63 IN | WEIGHT: 172 LBS

## 2019-10-26 LAB
25(OH)D3+25(OH)D2 SERPL-MCNC: 29.1 NG/ML (ref 30–100)
ALBUMIN SERPL-MCNC: 4.6 G/DL (ref 3.5–5.2)
ALBUMIN/GLOB SERPL: 2 G/DL
ALP SERPL-CCNC: 49 U/L (ref 39–117)
ALT SERPL-CCNC: 54 U/L (ref 1–41)
AST SERPL-CCNC: 56 U/L (ref 1–40)
BASOPHILS # BLD AUTO: 0.05 10*3/MM3 (ref 0–0.2)
BASOPHILS NFR BLD AUTO: 0.8 % (ref 0–1.5)
BILIRUB SERPL-MCNC: 0.4 MG/DL (ref 0.2–1.2)
BUN SERPL-MCNC: 18 MG/DL (ref 8–23)
BUN/CREAT SERPL: 17.8 (ref 7–25)
CALCIUM SERPL-MCNC: 9.4 MG/DL (ref 8.6–10.5)
CHLORIDE SERPL-SCNC: 99 MMOL/L (ref 98–107)
CHOLEST SERPL-MCNC: 139 MG/DL (ref 0–200)
CO2 SERPL-SCNC: 25.8 MMOL/L (ref 22–29)
CREAT SERPL-MCNC: 1.01 MG/DL (ref 0.76–1.27)
EOSINOPHIL # BLD AUTO: 0.6 10*3/MM3 (ref 0–0.4)
EOSINOPHIL NFR BLD AUTO: 9.1 % (ref 0.3–6.2)
ERYTHROCYTE [DISTWIDTH] IN BLOOD BY AUTOMATED COUNT: 12.7 % (ref 12.3–15.4)
GLOBULIN SER CALC-MCNC: 2.3 GM/DL
GLUCOSE SERPL-MCNC: 169 MG/DL (ref 65–99)
HBA1C MFR BLD: 6.3 % (ref 4.8–5.6)
HCT VFR BLD AUTO: 44.2 % (ref 37.5–51)
HDLC SERPL-MCNC: 46 MG/DL (ref 40–60)
HGB BLD-MCNC: 14.9 G/DL (ref 13–17.7)
IMM GRANULOCYTES # BLD AUTO: 0.02 10*3/MM3 (ref 0–0.05)
IMM GRANULOCYTES NFR BLD AUTO: 0.3 % (ref 0–0.5)
LDLC SERPL CALC-MCNC: 60 MG/DL (ref 0–100)
LYMPHOCYTES # BLD AUTO: 2.2 10*3/MM3 (ref 0.7–3.1)
LYMPHOCYTES NFR BLD AUTO: 33.3 % (ref 19.6–45.3)
MCH RBC QN AUTO: 28.5 PG (ref 26.6–33)
MCHC RBC AUTO-ENTMCNC: 33.7 G/DL (ref 31.5–35.7)
MCV RBC AUTO: 84.5 FL (ref 79–97)
MONOCYTES # BLD AUTO: 0.53 10*3/MM3 (ref 0.1–0.9)
MONOCYTES NFR BLD AUTO: 8 % (ref 5–12)
NEUTROPHILS # BLD AUTO: 3.21 10*3/MM3 (ref 1.7–7)
NEUTROPHILS NFR BLD AUTO: 48.5 % (ref 42.7–76)
NRBC BLD AUTO-RTO: 0 /100 WBC (ref 0–0.2)
PLATELET # BLD AUTO: 178 10*3/MM3 (ref 140–450)
POTASSIUM SERPL-SCNC: 4.8 MMOL/L (ref 3.5–5.2)
PROT SERPL-MCNC: 6.9 G/DL (ref 6–8.5)
RBC # BLD AUTO: 5.23 10*6/MM3 (ref 4.14–5.8)
SODIUM SERPL-SCNC: 136 MMOL/L (ref 136–145)
TRIGL SERPL-MCNC: 165 MG/DL (ref 0–150)
VIT B12 SERPL-MCNC: 485 PG/ML (ref 211–946)
VLDLC SERPL CALC-MCNC: 33 MG/DL (ref 5–40)
WBC # BLD AUTO: 6.61 10*3/MM3 (ref 3.4–10.8)

## 2019-12-12 DIAGNOSIS — N52.01 ERECTILE DYSFUNCTION DUE TO ARTERIAL INSUFFICIENCY: ICD-10-CM

## 2019-12-12 RX ORDER — SILDENAFIL CITRATE 20 MG/1
TABLET ORAL
Qty: 60 TABLET | Refills: 5 | Status: SHIPPED | OUTPATIENT
Start: 2019-12-12 | End: 2020-07-06

## 2019-12-23 ENCOUNTER — OFFICE VISIT (OUTPATIENT)
Dept: FAMILY MEDICINE CLINIC | Facility: CLINIC | Age: 79
End: 2019-12-23

## 2019-12-23 VITALS
BODY MASS INDEX: 30.83 KG/M2 | DIASTOLIC BLOOD PRESSURE: 78 MMHG | WEIGHT: 174 LBS | SYSTOLIC BLOOD PRESSURE: 128 MMHG | TEMPERATURE: 98.1 F | HEIGHT: 63 IN | OXYGEN SATURATION: 92 % | HEART RATE: 50 BPM

## 2019-12-23 DIAGNOSIS — H61.23 BILATERAL IMPACTED CERUMEN: Primary | ICD-10-CM

## 2019-12-23 PROCEDURE — 69210 REMOVE IMPACTED EAR WAX UNI: CPT | Performed by: NURSE PRACTITIONER

## 2019-12-23 PROCEDURE — 99213 OFFICE O/P EST LOW 20 MIN: CPT | Performed by: NURSE PRACTITIONER

## 2019-12-23 NOTE — PROGRESS NOTES
"Guanakito Meriad is a 79 y.o. male.     Chief Complaint   Patient presents with   • Ears Feel Full       History of Present Illness     Ear Complaint-patient acute walk-in to the clinic today with ear complaint.  Sensation of fullness and decreased hearing.  No fever or ear pain.  History of impaction of cerumen.  Not at goal.     The following portions of the patient's history were reviewed and updated as appropriate: CC, ROS, allergies, current medications, past family history, past medical history, past social history, past surgical history and problem list.    Review of Systems   Constitutional: Negative for appetite change, fatigue and unexpected weight change.   HENT: Positive for ear pain and hearing loss. Negative for congestion, ear discharge, nosebleeds, postnasal drip, rhinorrhea and sore throat.         Ears \"itching\".     Eyes: Negative for pain and visual disturbance.   Respiratory: Negative for cough and shortness of breath.    Cardiovascular: Negative for chest pain and palpitations.   Gastrointestinal: Negative for abdominal pain, blood in stool, constipation and diarrhea.   Endocrine: Negative for cold intolerance and polydipsia.   Genitourinary: Negative for difficulty urinating, flank pain and hematuria.   Musculoskeletal: Negative for arthralgias and back pain.   Skin: Negative for color change and rash.   Allergic/Immunologic: Negative.    Neurological: Positive for light-headedness. Negative for dizziness, syncope, numbness and headaches.   Hematological: Negative.    Psychiatric/Behavioral: Negative for sleep disturbance and suicidal ideas.   All other systems reviewed and are negative.      Objective     /78   Pulse 50   Temp 98.1 °F (36.7 °C) (Temporal)   Ht 160 cm (63\")   Wt 78.9 kg (174 lb)   SpO2 92%   BMI 30.82 kg/m²     Physical Exam   Constitutional: He is oriented to person, place, and time. He appears well-developed and well-nourished. No distress.   HENT: "   Head: Normocephalic and atraumatic.   Right Ear: External ear normal. Decreased hearing is noted.   Left Ear: External ear normal. Decreased hearing is noted.   Nose: Nose normal.   Mouth/Throat: Oropharynx is clear and moist.   Bilateral cerumen impaction.  Moderate amount cerumen removed from right EAC with flexi-loop per provider.  Remaining wax flushed with copious amounts of water.  Unable to remove but scant amount from left EAC.  Left EAC flushed with water but unable to clear blockage.  Right TM retracted and dull   Eyes: Pupils are equal, round, and reactive to light. EOM are normal. No scleral icterus.   Neck: Normal range of motion. Neck supple. No JVD present. No thyromegaly present.   Cardiovascular: Normal rate, regular rhythm and normal heart sounds.   Pulmonary/Chest: Effort normal and breath sounds normal.   Abdominal: Soft. Bowel sounds are normal. There is no tenderness.   Neurological: He is alert and oriented to person, place, and time. No cranial nerve deficit. Coordination normal.   Skin: Skin is warm and dry.   Psychiatric: He has a normal mood and affect. His behavior is normal. Judgment and thought content normal.   Vitals reviewed.      Assessment/Plan     Problem List Items Addressed This Visit        Nervous and Auditory    Bilateral impacted cerumen - Primary    Current Assessment & Plan     Right EAC  impaction removed today in the office.  Unable to removed left impaction. patient will use softening drops x1 week nightly And return to the clinic in 1 week for possible removal             Emotional support and active listening provided.  Patient provided time to verbalize feelings.  Understands disease processes and need for medications.  Understands reasons for urgent and emergent care.  Patient (& family) verbalized agreement for treatment plan    RTC 1 week, sooner if needed for problems or concerns          This document has been electronically signed by:  TANG Conti,  FNP-C    Dragon disclaimer:  Much of this encounter note is an electronic transcription/translation of spoken language to printed text. The electronic translation of spoken language may permit erroneous, or at times, nonsensical words or phrases to be inadvertently transcribed; Although I have reviewed the note for such errors, some may still exist.

## 2019-12-30 ENCOUNTER — OFFICE VISIT (OUTPATIENT)
Dept: FAMILY MEDICINE CLINIC | Facility: CLINIC | Age: 79
End: 2019-12-30

## 2019-12-30 VITALS
BODY MASS INDEX: 30.48 KG/M2 | HEART RATE: 54 BPM | DIASTOLIC BLOOD PRESSURE: 70 MMHG | TEMPERATURE: 98.1 F | HEIGHT: 63 IN | OXYGEN SATURATION: 92 % | WEIGHT: 172 LBS | SYSTOLIC BLOOD PRESSURE: 130 MMHG

## 2019-12-30 DIAGNOSIS — H92.01 RIGHT EAR PAIN: Primary | ICD-10-CM

## 2019-12-30 PROCEDURE — 99212 OFFICE O/P EST SF 10 MIN: CPT | Performed by: NURSE PRACTITIONER

## 2019-12-30 RX ORDER — OFLOXACIN 3 MG/ML
5 SOLUTION AURICULAR (OTIC) 2 TIMES DAILY
Qty: 10 ML | Refills: 0 | Status: SHIPPED | OUTPATIENT
Start: 2019-12-30 | End: 2020-01-06

## 2019-12-30 NOTE — PROGRESS NOTES
"Subjective   Joselito Merida is a 79 y.o. male.     Chief Complaint   Patient presents with   • Earache       History of Present Illness     Right ear complaint-reports he has noted some drainage and ear pain. He reports he did not use softening drops in his ear because of the dark drainage that came out \"on my pillow\".  He has been putting a cotton ball in his ear at times \"because it keeps it warm\".  No rhinorrhea or sore throat.  He reports his hearing is much improved since his visit last week.  Ongoing.      The following portions of the patient's history were reviewed and updated as appropriate: CC, ROS, allergies, current medications, past family history, past medical history, past social history, past surgical history and problem list.      Review of Systems   Constitutional: Negative for appetite change, fatigue and unexpected weight change.   HENT: Positive for ear discharge, ear pain and hearing loss. Negative for congestion, nosebleeds, postnasal drip, rhinorrhea and sore throat.         Ears \"itching\".     Eyes: Negative for pain and visual disturbance.   Respiratory: Negative for cough and shortness of breath.    Cardiovascular: Negative for chest pain and palpitations.   Gastrointestinal: Negative for abdominal pain, blood in stool, constipation and diarrhea.   Endocrine: Negative for cold intolerance and polydipsia.   Genitourinary: Negative for difficulty urinating, flank pain and hematuria.   Musculoskeletal: Negative for arthralgias and back pain.   Skin: Negative for color change and rash.   Allergic/Immunologic: Negative.    Neurological: Positive for light-headedness. Negative for dizziness, syncope, numbness and headaches.   Hematological: Negative.    Psychiatric/Behavioral: Negative for sleep disturbance and suicidal ideas.   All other systems reviewed and are negative.      Objective     /70   Pulse 54 Comment: recheck of pulse  Temp 98.1 °F (36.7 °C) (Temporal)   Ht 160 cm (63\")   Wt " 78 kg (172 lb)   SpO2 92%   BMI 30.47 kg/m²     Physical Exam   Constitutional: He is oriented to person, place, and time. He appears well-developed and well-nourished. No distress.   HENT:   Head: Normocephalic and atraumatic.   Right Ear: External ear normal. Tympanic membrane is erythematous. A middle ear effusion is present.   Left Ear: External ear normal. A middle ear effusion is present.   Nose: Nose normal.   Mouth/Throat: Oropharynx is clear and moist. No oropharyngeal exudate.   Bilateral EAC clear.  No wax noted.   Eyes: Pupils are equal, round, and reactive to light. Conjunctivae and EOM are normal. No scleral icterus.   Neck: Normal range of motion. Neck supple. No JVD present. No thyromegaly present.   Cardiovascular: Normal rate, regular rhythm and normal heart sounds.   Pulmonary/Chest: Effort normal and breath sounds normal.   Abdominal: Soft. Bowel sounds are normal. There is no tenderness.   Neurological: He is alert and oriented to person, place, and time. No cranial nerve deficit. Coordination normal.   Skin: Skin is warm and dry.   Psychiatric: He has a normal mood and affect. His behavior is normal. Judgment and thought content normal.   Vitals reviewed.      Assessment/Plan     Problem List Items Addressed This Visit     None      Visit Diagnoses     Right ear pain    -  Primary    Relevant Medications    ofloxacin (FLOXIN OTIC) 0.3 % otic solution            Emotional support and active listening provided.  Patient provided time to verbalize feelings.  Understands disease processes and need for medications.  Understands reasons for urgent and emergent care.  Patient (& family) verbalized agreement for treatment plan.     RTC PRN 3-5 days for worsening or non resolving symptoms  Keep sched follow up with PCP.          This document has been electronically signed by:  TANG Conti FNP-C Dragon disclaimer:  Much of this encounter note is an electronic transcription/translation of  spoken language to printed text. The electronic translation of spoken language may permit erroneous, or at times, nonsensical words or phrases to be inadvertently transcribed; Although I have reviewed the note for such errors, some may still exist.

## 2020-01-06 DIAGNOSIS — E78.49 OTHER HYPERLIPIDEMIA: ICD-10-CM

## 2020-01-06 RX ORDER — PRAVASTATIN SODIUM 40 MG
TABLET ORAL
Qty: 30 TABLET | Refills: 4 | Status: SHIPPED | OUTPATIENT
Start: 2020-01-06 | End: 2020-05-12 | Stop reason: SDUPTHER

## 2020-02-20 DIAGNOSIS — F51.04 CHRONIC INSOMNIA: ICD-10-CM

## 2020-02-20 DIAGNOSIS — E11.42 TYPE 2 DIABETES MELLITUS WITH PERIPHERAL NEUROPATHY (HCC): ICD-10-CM

## 2020-02-20 RX ORDER — GABAPENTIN 600 MG/1
TABLET ORAL
Qty: 30 TABLET | Refills: 0 | Status: SHIPPED | OUTPATIENT
Start: 2020-02-20 | End: 2020-02-25 | Stop reason: SDUPTHER

## 2020-02-20 RX ORDER — LORAZEPAM 1 MG/1
TABLET ORAL
Qty: 30 TABLET | Refills: 0 | Status: SHIPPED | OUTPATIENT
Start: 2020-02-20 | End: 2020-02-25 | Stop reason: SDUPTHER

## 2020-02-25 ENCOUNTER — OFFICE VISIT (OUTPATIENT)
Dept: FAMILY MEDICINE CLINIC | Facility: CLINIC | Age: 80
End: 2020-02-25

## 2020-02-25 DIAGNOSIS — D36.9 DERMOID CYST: ICD-10-CM

## 2020-02-25 DIAGNOSIS — E11.42 TYPE 2 DIABETES MELLITUS WITH PERIPHERAL NEUROPATHY (HCC): ICD-10-CM

## 2020-02-25 DIAGNOSIS — J30.2 CHRONIC SEASONAL ALLERGIC RHINITIS: ICD-10-CM

## 2020-02-25 DIAGNOSIS — E53.8 VITAMIN B 12 DEFICIENCY: ICD-10-CM

## 2020-02-25 DIAGNOSIS — Z87.891 FORMER SMOKER: ICD-10-CM

## 2020-02-25 DIAGNOSIS — I10 ESSENTIAL HYPERTENSION: ICD-10-CM

## 2020-02-25 DIAGNOSIS — N40.1 BENIGN NON-NODULAR PROSTATIC HYPERPLASIA WITH LOWER URINARY TRACT SYMPTOMS: ICD-10-CM

## 2020-02-25 DIAGNOSIS — Z00.00 HEALTHCARE MAINTENANCE: ICD-10-CM

## 2020-02-25 DIAGNOSIS — L82.1 SEBORRHEIC KERATOSES: ICD-10-CM

## 2020-02-25 DIAGNOSIS — N52.01 ERECTILE DYSFUNCTION DUE TO ARTERIAL INSUFFICIENCY: ICD-10-CM

## 2020-02-25 DIAGNOSIS — J44.9 COPD MIXED TYPE (HCC): ICD-10-CM

## 2020-02-25 DIAGNOSIS — L30.8 ASTEOTOTIC DERMATITIS: ICD-10-CM

## 2020-02-25 DIAGNOSIS — K70.0 FATTY LIVER, ALCOHOLIC: ICD-10-CM

## 2020-02-25 DIAGNOSIS — F51.04 CHRONIC INSOMNIA: ICD-10-CM

## 2020-02-25 DIAGNOSIS — E78.2 MIXED HYPERLIPIDEMIA: Primary | ICD-10-CM

## 2020-02-25 PROCEDURE — 99214 OFFICE O/P EST MOD 30 MIN: CPT | Performed by: GENERAL PRACTICE

## 2020-02-25 RX ORDER — GABAPENTIN 600 MG/1
600 TABLET ORAL DAILY
Qty: 30 TABLET | Refills: 2 | Status: SHIPPED | OUTPATIENT
Start: 2020-02-25 | End: 2020-05-12 | Stop reason: SDUPTHER

## 2020-02-25 RX ORDER — LORAZEPAM 1 MG/1
1 TABLET ORAL NIGHTLY
Qty: 30 TABLET | Refills: 2 | Status: SHIPPED | OUTPATIENT
Start: 2020-02-25 | End: 2020-05-12 | Stop reason: SDUPTHER

## 2020-02-25 RX ORDER — TRIAMCINOLONE ACETONIDE 1 MG/G
CREAM TOPICAL 2 TIMES DAILY
Qty: 80 G | Refills: 5
Start: 2020-02-25 | End: 2021-04-28

## 2020-02-25 NOTE — PROGRESS NOTES
Subjective   Joselito Merida is a 79 y.o. male.     History of Present Illness     COPD  The patient reports that his SOB and cough have remained at baseline since last here.  He continues to have intermittent nasal congestion and a postnasal drip. There is no history of any other upper respiratory tract symptoms and he denies any chest pain, hemoptysis, fever or chills. He states he is able to walk on level ground with no symptoms but continues to have trouble with inclines, stairs, or lifting. He is following the treatment plan, including medications as directed. He remains on breo generally provided through samples.  He has not required a rescue inhaler for some time. He has a history of 40 pack years.    Diabetes  Current symptoms include paresthesia of the feet. Patient denies visual disturbances, polydipsia, polyuria, hypoglycemia and foot ulcerations. Evaluation to date has been: hemoglobin A1C. Home sugars: patient does not check sugars. Current treatments: metformin. Last dilated eye exam within one year.    Lab Results   Component Value Date    HGBA1C 6.30 (H) 10/25/2019     Dyslipidemia  Compliance with treatment has been fair. The patient exercises intermittently. He is currently being prescribed the following medication for his dyslipidemia - pravastatin. Patient denies side effects associated with his medications.  Lab Results   Component Value Date    CHOL 156 03/24/2017    CHLPL 139 10/25/2019    TRIG 165 (H) 10/25/2019    HDL 46 10/25/2019    LDL 60 10/25/2019     Hypertension  Home blood pressure readings: not doing. Associated signs and symptoms: dyspnea. Patient denies: chest pain, palpitations, orthopnea, paroxysmal nocturnal dyspnea and peripheral edema. Current antihypertensive medications includes lisinopril. Medication compliance: taking as prescribed.   Lab Results   Component Value Date    GLUCOSE 146 (H) 03/24/2017    BUN 18 10/25/2019    CREATININE 1.01 10/25/2019    EGFRIFNONA 72 10/25/2019     EGFRIFAFRI 87 10/25/2019    BCR 17.8 10/25/2019    K 4.8 10/25/2019    CO2 25.8 10/25/2019    CALCIUM 9.4 10/25/2019    PROTENTOTREF 6.9 10/25/2019    ALBUMIN 4.60 10/25/2019    LABIL2 2.0 10/25/2019    AST 56 (H) 10/25/2019    ALT 54 (H) 10/25/2019     Benign Prostatic Hypertrophy  He admits to nocturia x 0-2 with a mild decrease in stream.  He denies any hesitancy or any sense of incomplete voiding and has had no dysuria or hematuria.  He remains on tamsulosin with no apparent side effects    Labs  Most recent vitamin D 29.1 with a B12 of 485    The following portions of the patient's history were reviewed and updated as appropriate: allergies, current medications, past medical history, past social history and problem list.    Review of Systems   Constitutional: Negative for appetite change, chills, fatigue, fever and unexpected weight change.   HENT: Positive for postnasal drip, rhinorrhea and sneezing. Negative for congestion, ear pain, sinus pressure, sore throat and voice change.    Eyes: Negative for visual disturbance.   Respiratory: Positive for cough and shortness of breath. Negative for wheezing.    Cardiovascular: Negative for chest pain, palpitations and leg swelling.   Gastrointestinal: Negative for abdominal pain, blood in stool, constipation, diarrhea, nausea and vomiting.   Endocrine: Negative for polydipsia and polyuria.   Genitourinary: Negative for difficulty urinating, dysuria, frequency, hematuria and urgency.        Nocturia with decreased stream   Musculoskeletal: Negative for arthralgias, back pain, joint swelling, myalgias and neck pain.   Skin: Positive for rash (slightly prurtic rash about torso since last here).   Neurological: Positive for numbness. Negative for weakness and headaches.   Psychiatric/Behavioral: Positive for sleep disturbance. Negative for dysphoric mood and suicidal ideas. The patient is not nervous/anxious.      Objective   Physical Exam   Constitutional: He is  oriented to person, place, and time. He appears well-developed and well-nourished. He is cooperative. He does not have a sickly appearance. No distress.   Well in general.  No apparent distress.  No pallor, cyanosis, diaphoresis, or jaundice   HENT:   Head: Atraumatic.   Right Ear: Tympanic membrane, external ear and ear canal normal.   Left Ear: Tympanic membrane, external ear and ear canal normal.   Mouth/Throat: Oropharynx is clear and moist.   Eyes: Pupils are equal, round, and reactive to light. EOM are normal. No scleral icterus.   Neck: No JVD present. Carotid bruit is not present. No tracheal deviation present. No thyromegaly present.   Cardiovascular: Normal rate, regular rhythm, S1 normal, S2 normal, normal heart sounds and intact distal pulses. Exam reveals no gallop.   No murmur heard.  Pulmonary/Chest: No accessory muscle usage. No respiratory distress. He has decreased breath sounds. He has wheezes. He has no rales.   Pulmonary hyperinflation   Musculoskeletal: He exhibits no tenderness or deformity.   Lymphadenopathy:        Head (right side): No submandibular adenopathy present.        Head (left side): No submandibular adenopathy present.     He has no cervical adenopathy.   Neurological: He is alert and oriented to person, place, and time. A sensory deficit (decreased vibration sense both feet) is present. No cranial nerve deficit. He exhibits normal muscle tone. Coordination normal.   Skin: Skin is warm and dry. No rash noted. He is not diaphoretic. No pallor. Nails show no clubbing.   Skin about the torso somewhat dry scattered areas of mild erythema.  Numerous circular or oval well demarcated slightly raised papules and plaques about the torso with verrucous surface varying in color from skin colored to uniformly pigmented.  Two well demarcated cystic masses mobile with skin mid and lower back   Psychiatric: He has a normal mood and affect. His behavior is normal.     Assessment/Plan   Problems  Addressed this Visit        Cardiovascular and Mediastinum    Essential hypertension   Hypertension: at goal. Evidence of target organ damage: none.  Encouraged to continue to work on diet and exercise plan.   Continue current medication    Mixed hyperlipidemia   As above.   Continue current medication.       Respiratory    COPD mixed type (CMS/HCC)   COPD is stable.  Encouraged to remain as active as symptoms allow for    Chronic seasonal allergic rhinitis       Digestive    Vitamin B 12 deficiency  Continue supplementation with monitoring.    Fatty liver, alcoholic       Endocrine    Type 2 diabetes mellitus with peripheral neuropathy (CMS/HCC)  Diabetes mellitus Type II, under excellent control.   Encouraged to continue to pursue ADA diet  Encouraged aerobic exercise.  Continue current medication  Updated labs will be drawn at his return.    Relevant Medications    gabapentin (NEURONTIN) 600 MG tablet    Dermoid cyst       Nervous and Auditory    Asteototic dermatitis  Advised regarding skin care  Low potency topical corticosteroid  Encouraged to report if any worse or if any new symptoms or concerns.    Relevant Medications    triamcinolone (KENALOG) 0.1 % cream       Musculoskeletal and Integument    Seborrheic keratoses    Genitourinary   Benign non-nodular prostatic hyperplasia with lower urinary tract symptoms  Continues to do well  Encouraged to report if this should change.      Other   Chronic insomnia   Relevant Medications   LORazepam (ATIVAN) 1 MG tablet   Vasculogenic erectile dysfunction   Healthcare maintenance  Patient uninterested in an updated colonoscopy but will consider   Former smoker

## 2020-02-26 VITALS
WEIGHT: 173 LBS | TEMPERATURE: 98.2 F | OXYGEN SATURATION: 93 % | RESPIRATION RATE: 14 BRPM | HEART RATE: 62 BPM | SYSTOLIC BLOOD PRESSURE: 112 MMHG | DIASTOLIC BLOOD PRESSURE: 64 MMHG | BODY MASS INDEX: 30.65 KG/M2 | HEIGHT: 63 IN

## 2020-02-26 PROBLEM — L30.8 ASTEOTOTIC DERMATITIS: Status: ACTIVE | Noted: 2020-02-26

## 2020-02-26 PROBLEM — L82.1 SEBORRHEIC KERATOSES: Status: ACTIVE | Noted: 2020-02-26

## 2020-02-26 PROBLEM — H61.23 BILATERAL IMPACTED CERUMEN: Status: RESOLVED | Noted: 2019-10-25 | Resolved: 2020-02-26

## 2020-03-20 DIAGNOSIS — F51.04 CHRONIC INSOMNIA: ICD-10-CM

## 2020-03-20 DIAGNOSIS — E11.42 TYPE 2 DIABETES MELLITUS WITH PERIPHERAL NEUROPATHY (HCC): ICD-10-CM

## 2020-03-20 RX ORDER — LORAZEPAM 1 MG/1
1 TABLET ORAL NIGHTLY
Qty: 30 TABLET | Refills: 0 | OUTPATIENT
Start: 2020-03-20

## 2020-03-20 RX ORDER — GABAPENTIN 600 MG/1
TABLET ORAL
Qty: 30 TABLET | Refills: 0 | OUTPATIENT
Start: 2020-03-20

## 2020-05-05 DIAGNOSIS — I10 ESSENTIAL HYPERTENSION: ICD-10-CM

## 2020-05-05 RX ORDER — LISINOPRIL 10 MG/1
TABLET ORAL
Qty: 30 TABLET | Refills: 5 | Status: SHIPPED | OUTPATIENT
Start: 2020-05-05 | End: 2020-11-04

## 2020-05-12 ENCOUNTER — OFFICE VISIT (OUTPATIENT)
Dept: FAMILY MEDICINE CLINIC | Facility: CLINIC | Age: 80
End: 2020-05-12

## 2020-05-12 VITALS
HEIGHT: 63 IN | DIASTOLIC BLOOD PRESSURE: 68 MMHG | HEART RATE: 52 BPM | TEMPERATURE: 97.1 F | BODY MASS INDEX: 30.48 KG/M2 | WEIGHT: 172 LBS | SYSTOLIC BLOOD PRESSURE: 126 MMHG | OXYGEN SATURATION: 95 % | RESPIRATION RATE: 14 BRPM

## 2020-05-12 DIAGNOSIS — F51.04 CHRONIC INSOMNIA: ICD-10-CM

## 2020-05-12 DIAGNOSIS — K70.0 FATTY LIVER, ALCOHOLIC: ICD-10-CM

## 2020-05-12 DIAGNOSIS — Z87.891 FORMER SMOKER: ICD-10-CM

## 2020-05-12 DIAGNOSIS — J44.9 COPD MIXED TYPE (HCC): ICD-10-CM

## 2020-05-12 DIAGNOSIS — Z00.00 HEALTHCARE MAINTENANCE: ICD-10-CM

## 2020-05-12 DIAGNOSIS — J30.2 CHRONIC SEASONAL ALLERGIC RHINITIS: ICD-10-CM

## 2020-05-12 DIAGNOSIS — E78.2 MIXED HYPERLIPIDEMIA: Primary | ICD-10-CM

## 2020-05-12 DIAGNOSIS — E78.49 OTHER HYPERLIPIDEMIA: ICD-10-CM

## 2020-05-12 DIAGNOSIS — I10 ESSENTIAL HYPERTENSION: ICD-10-CM

## 2020-05-12 DIAGNOSIS — E53.8 VITAMIN B 12 DEFICIENCY: ICD-10-CM

## 2020-05-12 DIAGNOSIS — M81.0 AGE-RELATED OSTEOPOROSIS WITHOUT CURRENT PATHOLOGICAL FRACTURE: ICD-10-CM

## 2020-05-12 DIAGNOSIS — N40.1 BENIGN NON-NODULAR PROSTATIC HYPERPLASIA WITH LOWER URINARY TRACT SYMPTOMS: ICD-10-CM

## 2020-05-12 DIAGNOSIS — E11.42 TYPE 2 DIABETES MELLITUS WITH PERIPHERAL NEUROPATHY (HCC): ICD-10-CM

## 2020-05-12 DIAGNOSIS — N52.01 ERECTILE DYSFUNCTION DUE TO ARTERIAL INSUFFICIENCY: ICD-10-CM

## 2020-05-12 PROCEDURE — 99214 OFFICE O/P EST MOD 30 MIN: CPT | Performed by: GENERAL PRACTICE

## 2020-05-12 RX ORDER — LORAZEPAM 1 MG/1
1 TABLET ORAL NIGHTLY
Qty: 30 TABLET | Refills: 3 | Status: SHIPPED | OUTPATIENT
Start: 2020-05-12 | End: 2020-06-20 | Stop reason: SDUPTHER

## 2020-05-12 RX ORDER — GABAPENTIN 600 MG/1
600 TABLET ORAL DAILY
Qty: 30 TABLET | Refills: 3 | Status: SHIPPED | OUTPATIENT
Start: 2020-05-12 | End: 2020-09-11 | Stop reason: SDUPTHER

## 2020-05-12 RX ORDER — TAMSULOSIN HYDROCHLORIDE 0.4 MG/1
1 CAPSULE ORAL
Qty: 30 CAPSULE | Refills: 5 | Status: SHIPPED | OUTPATIENT
Start: 2020-05-12 | End: 2021-01-26 | Stop reason: SDUPTHER

## 2020-05-12 RX ORDER — PRAVASTATIN SODIUM 40 MG
40 TABLET ORAL DAILY
Qty: 30 TABLET | Refills: 5 | Status: SHIPPED | OUTPATIENT
Start: 2020-05-12 | End: 2021-01-26 | Stop reason: SDUPTHER

## 2020-05-13 DIAGNOSIS — E53.8 VITAMIN B 12 DEFICIENCY: ICD-10-CM

## 2020-05-13 DIAGNOSIS — E55.9 VITAMIN D DEFICIENCY: Primary | ICD-10-CM

## 2020-05-13 DIAGNOSIS — E11.42 TYPE 2 DIABETES MELLITUS WITH PERIPHERAL NEUROPATHY (HCC): ICD-10-CM

## 2020-05-13 LAB
25(OH)D3+25(OH)D2 SERPL-MCNC: 24.3 NG/ML (ref 30–100)
ALBUMIN SERPL-MCNC: 4.3 G/DL (ref 3.5–5.2)
ALBUMIN/GLOB SERPL: 1.5 G/DL
ALP SERPL-CCNC: 49 U/L (ref 39–117)
ALT SERPL-CCNC: 53 U/L (ref 1–41)
AST SERPL-CCNC: 51 U/L (ref 1–40)
BASOPHILS # BLD AUTO: 0.04 10*3/MM3 (ref 0–0.2)
BASOPHILS NFR BLD AUTO: 0.7 % (ref 0–1.5)
BILIRUB SERPL-MCNC: 0.4 MG/DL (ref 0.2–1.2)
BUN SERPL-MCNC: 17 MG/DL (ref 8–23)
BUN/CREAT SERPL: 17.9 (ref 7–25)
CALCIUM SERPL-MCNC: 9.4 MG/DL (ref 8.6–10.5)
CHLORIDE SERPL-SCNC: 99 MMOL/L (ref 98–107)
CHOLEST SERPL-MCNC: 150 MG/DL (ref 0–200)
CO2 SERPL-SCNC: 25.9 MMOL/L (ref 22–29)
CREAT SERPL-MCNC: 0.95 MG/DL (ref 0.76–1.27)
EOSINOPHIL # BLD AUTO: 0.29 10*3/MM3 (ref 0–0.4)
EOSINOPHIL NFR BLD AUTO: 4.8 % (ref 0.3–6.2)
ERYTHROCYTE [DISTWIDTH] IN BLOOD BY AUTOMATED COUNT: 14.3 % (ref 12.3–15.4)
GLOBULIN SER CALC-MCNC: 2.9 GM/DL
GLUCOSE SERPL-MCNC: 269 MG/DL (ref 65–99)
HBA1C MFR BLD: 7.6 % (ref 4.8–5.6)
HCT VFR BLD AUTO: 46.2 % (ref 37.5–51)
HDLC SERPL-MCNC: 53 MG/DL (ref 40–60)
HGB BLD-MCNC: 15.8 G/DL (ref 13–17.7)
IMM GRANULOCYTES # BLD AUTO: 0.02 10*3/MM3 (ref 0–0.05)
IMM GRANULOCYTES NFR BLD AUTO: 0.3 % (ref 0–0.5)
LDLC SERPL CALC-MCNC: 51 MG/DL (ref 0–100)
LYMPHOCYTES # BLD AUTO: 1.9 10*3/MM3 (ref 0.7–3.1)
LYMPHOCYTES NFR BLD AUTO: 31.6 % (ref 19.6–45.3)
MCH RBC QN AUTO: 29.6 PG (ref 26.6–33)
MCHC RBC AUTO-ENTMCNC: 34.2 G/DL (ref 31.5–35.7)
MCV RBC AUTO: 86.7 FL (ref 79–97)
MICROALBUMIN UR-MCNC: 73.6 UG/ML
MONOCYTES # BLD AUTO: 0.52 10*3/MM3 (ref 0.1–0.9)
MONOCYTES NFR BLD AUTO: 8.7 % (ref 5–12)
NEUTROPHILS # BLD AUTO: 3.24 10*3/MM3 (ref 1.7–7)
NEUTROPHILS NFR BLD AUTO: 53.9 % (ref 42.7–76)
NRBC BLD AUTO-RTO: 0.2 /100 WBC (ref 0–0.2)
PLATELET # BLD AUTO: 175 10*3/MM3 (ref 140–450)
POTASSIUM SERPL-SCNC: 4.8 MMOL/L (ref 3.5–5.2)
PROT SERPL-MCNC: 7.2 G/DL (ref 6–8.5)
RBC # BLD AUTO: 5.33 10*6/MM3 (ref 4.14–5.8)
SODIUM SERPL-SCNC: 137 MMOL/L (ref 136–145)
TRIGL SERPL-MCNC: 229 MG/DL (ref 0–150)
VIT B12 SERPL-MCNC: 260 PG/ML (ref 211–946)
VLDLC SERPL CALC-MCNC: 45.8 MG/DL (ref 5–40)
WBC # BLD AUTO: 6.01 10*3/MM3 (ref 3.4–10.8)

## 2020-05-13 RX ORDER — VITAMIN B COMPLEX
1000 TABLET ORAL DAILY
Qty: 30 TABLET | Refills: 5 | Status: SHIPPED | OUTPATIENT
Start: 2020-05-13 | End: 2021-10-04 | Stop reason: SDUPTHER

## 2020-05-15 NOTE — PROGRESS NOTES
Spoke with pt about the following per Dr. Medrano. [facility]    -- Please let patient know that his glucose has been running higher and that his B12 and vitamin D lower   He needs to increase his metformin to 500 twice daily   He also needs to resume B12 1000 daily and start vitamin D 1000 daily   I have emailed prescriptions but the vitamins may be cheaper to buy OTC

## 2020-06-19 DIAGNOSIS — E11.42 TYPE 2 DIABETES MELLITUS WITH PERIPHERAL NEUROPATHY (HCC): ICD-10-CM

## 2020-06-19 DIAGNOSIS — F51.04 CHRONIC INSOMNIA: ICD-10-CM

## 2020-06-19 RX ORDER — LORAZEPAM 1 MG/1
TABLET ORAL
Qty: 30 TABLET | Refills: 2 | OUTPATIENT
Start: 2020-06-19

## 2020-06-19 RX ORDER — GABAPENTIN 600 MG/1
TABLET ORAL
Qty: 30 TABLET | Refills: 2 | OUTPATIENT
Start: 2020-06-19

## 2020-06-20 DIAGNOSIS — F51.04 CHRONIC INSOMNIA: ICD-10-CM

## 2020-06-20 RX ORDER — LORAZEPAM 1 MG/1
1 TABLET ORAL NIGHTLY
Qty: 30 TABLET | Refills: 3 | Status: SHIPPED | OUTPATIENT
Start: 2020-06-20 | End: 2020-09-11 | Stop reason: SDUPTHER

## 2020-06-22 ENCOUNTER — TELEPHONE (OUTPATIENT)
Dept: FAMILY MEDICINE CLINIC | Facility: CLINIC | Age: 80
End: 2020-06-22

## 2020-06-22 NOTE — TELEPHONE ENCOUNTER
----- Message from Markie Medrano MD sent at 6/20/2020  9:51 AM EDT -----  Emailed  Tanmay please    ----- Message -----  From: Riya Liang MA  Sent: 6/19/2020   3:57 PM EDT  To: Markie Medrano MD    Patient's pharmacy called and stated that they do have the prescription from 05/12 on the patient gabapentin. They do not have the prescription for the ativan though and mary says he doesn't have it at home. Could you send that one in through the pharmacy?

## 2020-07-06 DIAGNOSIS — N52.01 ERECTILE DYSFUNCTION DUE TO ARTERIAL INSUFFICIENCY: ICD-10-CM

## 2020-07-06 RX ORDER — SILDENAFIL CITRATE 20 MG/1
TABLET ORAL
Qty: 60 TABLET | Refills: 5 | Status: SHIPPED | OUTPATIENT
Start: 2020-07-06 | End: 2021-01-26 | Stop reason: SDUPTHER

## 2020-09-11 ENCOUNTER — OFFICE VISIT (OUTPATIENT)
Dept: FAMILY MEDICINE CLINIC | Facility: CLINIC | Age: 80
End: 2020-09-11

## 2020-09-11 DIAGNOSIS — I10 ESSENTIAL HYPERTENSION: ICD-10-CM

## 2020-09-11 DIAGNOSIS — L82.1 SEBORRHEIC KERATOSES: ICD-10-CM

## 2020-09-11 DIAGNOSIS — J44.9 COPD MIXED TYPE (HCC): ICD-10-CM

## 2020-09-11 DIAGNOSIS — E11.42 TYPE 2 DIABETES MELLITUS WITH PERIPHERAL NEUROPATHY (HCC): Primary | ICD-10-CM

## 2020-09-11 DIAGNOSIS — Z00.00 HEALTHCARE MAINTENANCE: ICD-10-CM

## 2020-09-11 DIAGNOSIS — Z23 ENCOUNTER FOR IMMUNIZATION: ICD-10-CM

## 2020-09-11 DIAGNOSIS — K70.0 FATTY LIVER, ALCOHOLIC: ICD-10-CM

## 2020-09-11 DIAGNOSIS — Z87.891 FORMER SMOKER: ICD-10-CM

## 2020-09-11 DIAGNOSIS — J30.2 CHRONIC SEASONAL ALLERGIC RHINITIS: ICD-10-CM

## 2020-09-11 DIAGNOSIS — E53.8 VITAMIN B 12 DEFICIENCY: ICD-10-CM

## 2020-09-11 DIAGNOSIS — E78.2 MIXED HYPERLIPIDEMIA: ICD-10-CM

## 2020-09-11 DIAGNOSIS — N52.01 ERECTILE DYSFUNCTION DUE TO ARTERIAL INSUFFICIENCY: ICD-10-CM

## 2020-09-11 DIAGNOSIS — F51.04 CHRONIC INSOMNIA: ICD-10-CM

## 2020-09-11 DIAGNOSIS — E55.9 VITAMIN D DEFICIENCY: ICD-10-CM

## 2020-09-11 DIAGNOSIS — N40.1 BENIGN NON-NODULAR PROSTATIC HYPERPLASIA WITH LOWER URINARY TRACT SYMPTOMS: ICD-10-CM

## 2020-09-11 PROCEDURE — 96160 PT-FOCUSED HLTH RISK ASSMT: CPT | Performed by: GENERAL PRACTICE

## 2020-09-11 PROCEDURE — G0008 ADMIN INFLUENZA VIRUS VAC: HCPCS | Performed by: GENERAL PRACTICE

## 2020-09-11 PROCEDURE — 90686 IIV4 VACC NO PRSV 0.5 ML IM: CPT | Performed by: GENERAL PRACTICE

## 2020-09-11 PROCEDURE — G0439 PPPS, SUBSEQ VISIT: HCPCS | Performed by: GENERAL PRACTICE

## 2020-09-11 RX ORDER — GABAPENTIN 600 MG/1
600 TABLET ORAL DAILY
Qty: 30 TABLET | Refills: 3 | Status: SHIPPED | OUTPATIENT
Start: 2020-09-11 | End: 2021-01-26 | Stop reason: SDUPTHER

## 2020-09-11 RX ORDER — LORAZEPAM 1 MG/1
1 TABLET ORAL NIGHTLY
Qty: 30 TABLET | Refills: 3 | Status: SHIPPED | OUTPATIENT
Start: 2020-09-11 | End: 2021-01-26 | Stop reason: SDUPTHER

## 2020-09-11 NOTE — PROGRESS NOTES
The ABCs of the Annual Wellness Visit  Subsequent Medicare Wellness Visit    Subjective   History of Present Illness:  Joselito Merida is a 79 y.o. male who presents for a Subsequent Medicare Wellness Visit.    COPD  He complains of persistent shortness of breath with exertion associated with an intermittent dry cough.  He continues to experience intermittent rhinorrhea and nasal congestion.  There is no history of any other upper respiratory tract symptoms and he continues to deny any chest pain, hemoptysis, fever or chills. He states he is able to walk on level ground with no symptoms but continues to have trouble with inclines, stairs, or lifting. He is following the treatment plan, including medications as directed. He remains on breo or trelegy generally provided through samples.  He has not required a rescue inhaler for some time. He has a smoking history of 40 pack years.    Diabetes  Current symptoms include paresthesia of the feet. Patient denies visual disturbances, polydipsia, polyuria, hypoglycemia and foot ulcerations. Evaluation to date has been: hemoglobin A1C. Home sugars: patient does not check sugars. Current treatments: metformin.  The dosage was titrated to 500 twice daily following his most recent labs.  He denies any apparent side effects  Lab Results   Component Value Date    HGBA1C 7.60 (H) 05/12/2020     Lab Results   Component Value Date    MICROALBUR 73.6 05/12/2020     Dyslipidemia  Compliance with treatment has been fair. The patient exercises intermittently. He is currently being prescribed the following medication for his dyslipidemia - pravastatin. Patient denies side effects associated with his medications.  Lab Results   Component Value Date    CHOL 156 03/24/2017    CHLPL 150 05/12/2020    TRIG 229 (H) 05/12/2020    HDL 53 05/12/2020    LDL 51 05/12/2020     Hypertension  Home blood pressure readings: not doing. Associated signs and symptoms: dyspnea. Patient denies: chest pain,  palpitations, orthopnea, paroxysmal nocturnal dyspnea and peripheral edema. Current antihypertensive medications includes lisinopril. Medication compliance: taking as prescribed.   Lab Results   Component Value Date    GLUCOSE 146 (H) 03/24/2017    BUN 17 05/12/2020    CREATININE 0.95 05/12/2020    EGFRIFNONA 77 05/12/2020    EGFRIFAFRI 93 05/12/2020    BCR 17.9 05/12/2020    K 4.8 05/12/2020    CO2 25.9 05/12/2020    CALCIUM 9.4 05/12/2020    PROTENTOTREF 7.2 05/12/2020    ALBUMIN 4.30 05/12/2020    LABIL2 1.5 05/12/2020    AST 51 (H) 05/12/2020    ALT 53 (H) 05/12/2020     Benign Prostatic Hypertrophy  He continues to have nocturia x 0-2 with a mild decrease in stream and an occasional sense of incomplete voiding.  He denies any hesitancy, dysuria or hematuria.  He remains on tamsulosin with no apparent side effects    Labs  Most recent vitamin D 24.3 with a B12 of 260    HEALTH RISK ASSESSMENT    Recent Hospitalizations:  No hospitalization(s) within the last year.    Current Medical Providers:  Patient Care Team:  Markie Medrano MD as PCP - General (Family Medicine)    Smoking Status:  Social History     Tobacco Use   Smoking Status Never Smoker   Smokeless Tobacco Current User   • Types: Chew       Alcohol Consumption:  Social History     Substance and Sexual Activity   Alcohol Use Yes       Depression Screen:   PHQ-2/PHQ-9 Depression Screening 9/11/2020   Little interest or pleasure in doing things 0   Feeling down, depressed, or hopeless 0   Trouble falling or staying asleep, or sleeping too much 0   Feeling tired or having little energy 0   Poor appetite or overeating 0   Feeling bad about yourself - or that you are a failure or have let yourself or your family down 0   Trouble concentrating on things, such as reading the newspaper or watching television 0   Moving or speaking so slowly that other people could have noticed. Or the opposite - being so fidgety or restless that you have been moving  around a lot more than usual 0   Thoughts that you would be better off dead, or of hurting yourself in some way 0   Total Score 0   If you checked off any problems, how difficult have these problems made it for you to do your work, take care of things at home, or get along with other people? Not difficult at all       Fall Risk Screen:  JENNIFER Fall Risk Assessment was completed, and patient is at LOW risk for falls.Assessment completed on:9/11/2020    Health Habits and Functional and Cognitive Screening:  Functional & Cognitive Status 9/11/2020   Do you have difficulty preparing food and eating? No   Do you have difficulty bathing yourself, getting dressed or grooming yourself? No   Do you have difficulty using the toilet? No   Do you have difficulty moving around from place to place? No   Do you have trouble with steps or getting out of a bed or a chair? No   Current Diet Well Balanced Diet   Dental Exam Not up to date   Eye Exam Not up to date   Exercise (times per week) 7 times per week   Current Exercise Activities Include Yard Work   Do you need help using the phone?  No   Are you deaf or do you have serious difficulty hearing?  No   Do you need help with transportation? No   Do you need help shopping? No   Do you need help preparing meals?  No   Do you need help with housework?  No   Do you need help with laundry? No   Do you need help taking your medications? No   Do you need help managing money? No   Do you ever drive or ride in a car without wearing a seat belt? No   Have you felt unusual stress, anger or loneliness in the last month? No   Who do you live with? Spouse   If you need help, do you have trouble finding someone available to you? No   Have you been bothered in the last four weeks by sexual problems? No   Do you have difficulty concentrating, remembering or making decisions? No     Does the patient have evidence of cognitive impairment? No    Asprin use counseling:Taking ASA appropriately as  indicated    Age-appropriate Screening Schedule:  Refer to the list below for future screening recommendations based on patient's age, sex and/or medical conditions. Orders for these recommended tests are listed in the plan section. The patient has been provided with a written plan.    Health Maintenance   Topic Date Due   • TDAP/TD VACCINES (1 - Tdap) 09/16/1959   • ZOSTER VACCINE (1 of 2) 09/16/1992   • DIABETIC EYE EXAM  04/04/2018   • DXA SCAN  12/31/2021 (Originally 8/23/2016)   • COLONOSCOPY  12/31/2021 (Originally 8/19/2019)   • HEMOGLOBIN A1C  11/12/2020   • LIPID PANEL  05/12/2021   • URINE MICROALBUMIN  05/12/2021   • INFLUENZA VACCINE  Completed          The following portions of the patient's history were reviewed and updated as appropriate: allergies, current medications, past family history, past medical history, past social history, past surgical history and problem list.    Outpatient Medications Prior to Visit   Medication Sig Dispense Refill   • aspirin 81 MG tablet Take 1 tablet by mouth daily.     • Cholecalciferol (VITAMIN D) 25 MCG (1000 UT) tablet Take 1 tablet by mouth Daily. 30 tablet 5   • cyanocobalamin (VITAMIN B-12) 1000 MCG tablet Take 1 tablet by mouth Daily. 30 tablet 5   • Fluticasone-Umeclidin-Vilant 100-62.5-25 MCG/INH aerosol powder  Inhale 1 each Every Morning. 3 each 0   • hydrOXYzine pamoate (VISTARIL) 50 MG capsule TAKE 1 CAPSULE BY MOUTH TWICE A DAY AND TAKE 2 CAPSULES AT BEDTIME 120 capsule 5   • lisinopril (PRINIVIL,ZESTRIL) 10 MG tablet TAKE 1 TABLET BY MOUTH ONCE DAILY 30 tablet 5   • metFORMIN (GLUCOPHAGE) 500 MG tablet Take 1 tablet by mouth 2 (Two) Times a Day With Meals. 60 tablet 5   • pravastatin (PRAVACHOL) 40 MG tablet Take 1 tablet by mouth Daily. 30 tablet 5   • RA ALLERGY RELIEF 10 MG tablet Take 1 tablet by mouth Daily. 30 tablet 5   • sildenafil (REVATIO) 20 MG tablet TAKE 2 TO 5 TABLETS BY MOUTH ONCE DAILY AS NEEDED 60 tablet 5   • tamsulosin (FLOMAX) 0.4 MG  capsule 24 hr capsule Take 1 capsule by mouth every night at bedtime. 30 capsule 5   • triamcinolone (KENALOG) 0.1 % cream Apply  topically to the appropriate area as directed 2 (Two) Times a Day. 80 g 5   • gabapentin (NEURONTIN) 600 MG tablet Take 1 tablet by mouth Daily. 30 tablet 3   • LORazepam (ATIVAN) 1 MG tablet Take 1 tablet by mouth Every Night. 30 tablet 3   • Zoster Vac Recomb Adjuvanted (SHINGRIX) 50 MCG/0.5ML reconstituted suspension Inject 50 mcg into the appropriate muscle as directed by prescriber See Admin Instructions. Repeat in 2-6 months 1 each 1     No facility-administered medications prior to visit.        Patient Active Problem List   Diagnosis   • Essential hypertension   • Mixed hyperlipidemia   • Type 2 diabetes mellitus with peripheral neuropathy (CMS/HCC)   • COPD mixed type (CMS/HCC)   • Vitamin B 12 deficiency   • Fatty liver, alcoholic   • Chronic insomnia   • Vasculogenic erectile dysfunction   • Benign non-nodular prostatic hyperplasia with lower urinary tract symptoms   • Chronic seasonal allergic rhinitis   • Healthcare maintenance   • Encounter for immunization   • Former smoker   • Skin lesion of left ear   • Dermoid cyst   • Seborrheic keratoses   • Asteototic dermatitis   • Vitamin D deficiency       Advanced Care Planning:  ACP discussion was held with the patient during this visit. Patient does not have an advance directive, information provided.    Review of Systems   Constitutional: Negative for appetite change, chills, fatigue, fever and unexpected weight change.   HENT: Positive for congestion, rhinorrhea and sneezing. Negative for ear pain, postnasal drip, sinus pressure, sore throat and voice change.    Eyes: Negative for visual disturbance.   Respiratory: Positive for cough and shortness of breath. Negative for wheezing.    Cardiovascular: Negative for chest pain, palpitations and leg swelling.   Gastrointestinal: Negative for abdominal pain, blood in stool,  "constipation, diarrhea, nausea and vomiting.   Endocrine: Negative for polydipsia and polyuria.   Genitourinary: Negative for dysuria, frequency, hematuria and urgency.        Nocturia with decreased stream and occasional sense of incomplete voiding   Musculoskeletal: Negative for arthralgias, back pain and myalgias.   Skin: Negative for rash.   Neurological: Positive for numbness. Negative for weakness and headaches.   Psychiatric/Behavioral: Positive for sleep disturbance. Negative for dysphoric mood and suicidal ideas. The patient is not nervous/anxious.      Compared to one year ago, the patient feels his physical health is the same.  Compared to one year ago, the patient feels his mental health is the same.    Reviewed chart for potential of high risk medication in the elderly: yes  Reviewed chart for potential of harmful drug interactions in the elderly:yes    Objective      Vitals:    09/11/20 1106   BP: 122/65   Pulse: 53   Resp: 14   Temp: 97.2 °F (36.2 °C)   TempSrc: Temporal   SpO2: 94%   Weight: 77.1 kg (170 lb)   Height: 160 cm (63\")       Body mass index is 30.11 kg/m².  Discussed the patient's BMI with him. The BMI is above average; BMI management plan is completed.    Physical Exam   Constitutional: He is oriented to person, place, and time. He appears well-developed. He is cooperative. No distress.   Well in general.  No apparent distress.  No pallor, cyanosis, diaphoresis, or jaundice   HENT:   Head: Atraumatic.   Right Ear: Tympanic membrane, external ear and ear canal normal.   Left Ear: Tympanic membrane, external ear and ear canal normal.   Eyes: Pupils are equal, round, and reactive to light. No scleral icterus.   Neck: No JVD present. Carotid bruit is not present. No tracheal deviation present. No thyromegaly present.   Cardiovascular: Normal rate, regular rhythm, S1 normal, S2 normal and normal heart sounds. Exam reveals no gallop.   No murmur heard.  Pulmonary/Chest: No accessory muscle " usage. No respiratory distress. He has decreased breath sounds. He has wheezes. He has no rales.   Pulmonary hyperinflation   Musculoskeletal: No tenderness or deformity.   Lymphadenopathy:        Head (right side): No submandibular adenopathy present.        Head (left side): No submandibular adenopathy present.     He has no cervical adenopathy.   Neurological: He is alert and oriented to person, place, and time. A sensory deficit (decreased vibration sense both feet) is present. No cranial nerve deficit. He exhibits normal muscle tone. Coordination normal.   Skin: Skin is warm and dry. No rash noted. He is not diaphoretic. No pallor. Nails show no clubbing.   2 cm oval well demarcated uniformly pigmented patch with varicose surface left mid back.  Numerous similar but smaller lesions about torso and extremities.   Psychiatric: His behavior is normal.     Assessment/Plan   Medicare Risks and Personalized Health Plan  CMS Preventative Services Quick Reference  Advance Directive Discussion  Cardiovascular risk  Colon Cancer Screening  Fall Risk  Immunizations Discussed/Encouraged (specific immunizations; adacel Tdap, Influenza and Shingrix )  Obesity/Overweight   Osteoprorosis Risk    The above risks/problems have been discussed with the patient.  Pertinent information has been shared with the patient in the After Visit Summary.  Follow up plans and orders are seen below in the Assessment/Plan Section.    Diagnoses and all orders for this visit:    1. Type 2 diabetes mellitus with peripheral neuropathy (CMS/[facility])   Diabetes mellitus Type II, under fair control.   Encouraged to continue to pursue ADA diet  Encouraged aerobic exercise.  Continue current medication  Updated labs drawn.  Recommended an updated diabetic eye exam  -     Hemoglobin A1c  -     MicroAlbumin, Urine, Random - Urine, Clean Catch  -     gabapentin (NEURONTIN) 600 MG tablet; Take 1 tablet by mouth Daily.  Dispense: 30 tablet; Refill: 3    2. Mixed  hyperlipidemia  As above.  Continue current medication  -     Lipid Panel  -     TSH    3. Essential hypertension   Hypertension: at goal. Evidence of target organ damage: none.   As above  Continue current medication  -     CBC & Differential  -     Comprehensive Metabolic Panel    4. COPD mixed type (CMS/HCC)   COPD is stable.  Encouraged to remain as active as symptoms allow for  Continue current medication    5. Chronic seasonal allergic rhinitis    6. Vitamin D deficiency  Continue supplementation with monitoring.  -     Vitamin D 25 Hydroxy    7. Vitamin B 12 deficiency  As above.  -     Vitamin B12    8. Fatty liver, alcoholic    9. Benign non-nodular prostatic hyperplasia with lower urinary tract symptoms  Continue current medication    10. Erectile dysfunction due to arterial insufficiency    11. Former smoker    12. Chronic insomnia  Continue current medication  -     LORazepam (ATIVAN) 1 MG tablet; Take 1 tablet by mouth Every Night.  Dispense: 30 tablet; Refill: 3    13. Healthcare maintenance  Flu shot administered  Recommended an updated Tdap along with Shingrix.  Prescriptions emailed to his pharmacy  Patient remains uninterested in a DEXA scan or in an updated colonoscopy but will consider  -     Zoster Vac Recomb Adjuvanted (Shingrix) 50 MCG/0.5ML reconstituted suspension; Inject 0.5 mL into the appropriate muscle as directed by prescriber See Admin Instructions. Repeat in 2-6 months  Dispense: 1 each; Refill: 1  -     Tdap (BOOSTRIX) 5-2.5-18.5 LF-MCG/0.5 injection; Inject 0.5 mL into the appropriate muscle as directed by prescriber 1 (One) Time for 1 dose.  Dispense: 1 each; Refill: 0  -     Hepatitis C Antibody    14. Seborrheic keratoses  Patient on interested in an excisional biopsy of the largest lesion over his back however will report if any changes or if he should decide differently    Follow Up:  Return in about 4 months (around 1/11/2021).     An After Visit Summary and PPPS were given to  the patient.

## 2020-09-11 NOTE — PATIENT INSTRUCTIONS
Advance Directive    Advance directives are legal documents that let you make choices ahead of time about your health care and medical treatment in case you become unable to communicate for yourself. Advance directives are a way for you to communicate your wishes to family, friends, and health care providers. This can help convey your decisions about end-of-life care if you become unable to communicate.  Discussing and writing advance directives should happen over time rather than all at once. Advance directives can be changed depending on your situation and what you want, even after you have signed the advance directives.  If you do not have an advance directive, some states assign family decision makers to act on your behalf based on how closely you are related to them. Each state has its own laws regarding advance directives. You may want to check with your health care provider, , or state representative about the laws in your state. There are different types of advance directives, such as:  · Medical power of .  · Living will.  · Do not resuscitate (DNR) or do not attempt resuscitation (DNAR) order.  Health care proxy and medical power of   A health care proxy, also called a health care agent, is a person who is appointed to make medical decisions for you in cases in which you are unable to make the decisions yourself. Generally, people choose someone they know well and trust to represent their preferences. Make sure to ask this person for an agreement to act as your proxy. A proxy may have to exercise judgment in the event of a medical decision for which your wishes are not known.  A medical power of  is a legal document that names your health care proxy. Depending on the laws in your state, after the document is written, it may also need to be:  · Signed.  · Notarized.  · Dated.  · Copied.  · Witnessed.  · Incorporated into your medical record.  You may also want to appoint  someone to manage your financial affairs in a situation in which you are unable to do so. This is called a durable power of  for finances. It is a separate legal document from the durable power of  for health care. You may choose the same person or someone different from your health care proxy to act as your agent in financial matters.  If you do not appoint a proxy, or if there is a concern that the proxy is not acting in your best interests, a court-appointed guardian may be designated to act on your behalf.  Living will  A living will is a set of instructions documenting your wishes about medical care when you cannot express them yourself. Health care providers should keep a copy of your living will in your medical record. You may want to give a copy to family members or friends. To alert caregivers in case of an emergency, you can place a card in your wallet to let them know that you have a living will and where they can find it. A living will is used if you become:  · Terminally ill.  · Incapacitated.  · Unable to communicate or make decisions.  Items to consider in your living will include:  · The use or non-use of life-sustaining equipment, such as dialysis machines and breathing machines (ventilators).  · A DNR or DNAR order, which is the instruction not to use cardiopulmonary resuscitation (CPR) if breathing or heartbeat stops.  · The use or non-use of tube feeding.  · Withholding of food and fluids.  · Comfort (palliative) care when the goal becomes comfort rather than a cure.  · Organ and tissue donation.  A living will does not give instructions for distributing your money and property if you should pass away. It is recommended that you seek the advice of a  when writing a will. Decisions about taxes, beneficiaries, and asset distribution will be legally binding. This process can relieve your family and friends of any concerns surrounding disputes or questions that may come up about  the distribution of your assets.  DNR or DNAR  A DNR or DNAR order is a request not to have CPR in the event that your heart stops beating or you stop breathing. If a DNR or DNAR order has not been made and shared, a health care provider will try to help any patient whose heart has stopped or who has stopped breathing. If you plan to have surgery, talk with your health care provider about how your DNR or DNAR order will be followed if problems occur.  Summary  · Advance directives are the legal documents that allow you to make choices ahead of time about your health care and medical treatment in case you become unable to communicate for yourself.  · The process of discussing and writing advance directives should happen over time. You can change the advance directives, even after you have signed them.  · Advance directives include DNR or DNAR orders, living johnson, and designating an agent as your medical power of .  This information is not intended to replace advice given to you by your health care provider. Make sure you discuss any questions you have with your health care provider.  Document Released: 03/26/2009 Document Revised: 01/22/2020 Document Reviewed: 11/06/2017  [employer] Patient Education © 2020 [employer]      Heart Disease Prevention  Heart disease is the leading cause of death in the world. Coronary artery disease is the most common cause of heart disease. This condition results when cholesterol and other substances (plaque) build up inside the walls of the blood vessels that supply your heart muscle (arteries). This buildup in arteries is called atherosclerosis. You can take actions to lower your risk of heart disease.  How can heart disease affect me?  Heart disease can cause many unpleasant symptoms and complications, such as:  · Chest pain (angina).  · Reduced or blocked blood flow to your heart. This can cause:  ? Irregular heartbeats (arrhythmias).  ? Heart attack.  ? Heart  failure.  What can increase my risk?  The following factors may make you more likely to develop this condition:  · High blood pressure (hypertension).  · High cholesterol.  · Smoking.  · A diet high in saturated fats or trans fats.  · Lack of physical activity.  · Obesity.  · Drinking too much alcohol.  · Diabetes.  · Having a family history of heart disease.  What actions can I take to prevent heart disease?  Nutrition    · Eat a heart-healthy eating plan as told by your health care provider. Examples include the DASH (Dietary Approaches to Stop Hypertension) eating plan or the Mediterranean diet.  · Generally, it is recommended that you:  ? Eat less salt (sodium). Ask your health care provider how much sodium is safe for you. Most people should have less than 2,300 mg each day.  ? Limit unhealthy fats, such as saturated and trans fats, in your diet. You can do this by eating low-fat dairy products, eating less red meat, and avoiding processed foods.  ? Eat healthy fats (omega-3 fatty acids). These are found in fish, such as mackerel or salmon.  ? Eat more fruits and vegetables. You should try to fill one-half of your plate with fruits and vegetables at each meal.  ? Eat more whole grains.  ? Avoid foods and drinks that have added sugars.  Lifestyle    · Get regular exercise. This is one of the most important things you can do for your health. Generally, it is recommended that you:  ? Exercise for at least 30 minutes on most days of the week (150 minutes each week). The exercise should increase your heart rate and make you sweat (aerobic exercise).  ? Add strength exercises on at least 2 days each week.  · Do not use any products that contain nicotine or tobacco, such as cigarettes and e-cigarettes. These can damage your heart and blood vessels. If you need help quitting, ask your health care provider.  Alcohol use  · Do not drink alcohol if:  ? Your health care provider tells you not to drink.  ? You are pregnant,  may be pregnant, or are planning to become pregnant.  · If you drink alcohol, limit how much you have:  ? 0-1 drink a day for women.  ? 0-2 drinks a day for men.  · Be aware of how much alcohol is in your drink. In the [place] one drink equals one typical bottle of beer (12 oz), one-half glass of wine (5 oz), or one shot of hard liquor (1½ oz).  Medicines  · Take over-the-counter and prescription medicines only as told by your health care provider.  · Ask your health care provider whether you should take an aspirin every day. Taking aspirin may help reduce your risk of heart disease and stroke.  · Depending on your risk factors, your health care provider may prescribe medicines to lower your risk of heart disease or to control related conditions. You may take medicine to:  ? Lower cholesterol.  ? Control blood pressure.  ? Control diabetes.  General information  · Keep your blood pressure under control, as recommended by your health care provider. For most healthy people, the upper number of your blood pressure (systolic) should be no higher than 120, and the lower number (diastolic) no higher than 80. Treatment may be needed if your blood pressure is higher than 130/80.  · Have your blood pressure checked at least every two years. Your health care provider may check your blood pressure more often if you have high blood pressure.  · After age 20, have your cholesterol checked every 4-6 years. If you have risk factors for heart disease, you may need to have it checked more frequently. Treatment may be needed if your cholesterol is high.  · Have your body mass index (BMI) checked every year. Your health care provider can calculate your BMI from your height and weight.  · Work with your health care provider to lose weight, if needed, or to maintain a healthy weight.  Where to find more information:  · Centers for Disease Control and Prevention: www.cdc.gov/heartdisease  · American Heart Association: www.heart.org  ? Take  a free online heart disease risk quiz to better understand your personal risk factors.  Summary  · Heart disease is the leading cause of death in the world.  · Heart disease can cause chest pain, abnormal heart rhythms, heart attack, and heart failure.  · High blood pressure, high cholesterol, and smoking are the main risk factors for heart disease, although other factors also contribute.  · You can take actions to lower your chances of developing heart disease. Work with your health care provider to reduce your risk by following a heart-healthy diet, being physically active, and controlling your weight, blood pressure, and cholesterol level.  This information is not intended to replace advice given to you by your health care provider. Make sure you discuss any questions you have with your health care provider.  Document Released: 08/01/2005 Document Revised: 01/02/2019 Document Reviewed: 01/02/2019  [employer] Patient Education © 2020 [employer]      Colorectal Cancer Screening    Colorectal cancer screening is a group of tests that are used to check for colorectal cancer before symptoms develop. Colorectal refers to the colon and rectum. The colon and rectum are located at the end of the digestive tract and carry bowel movements out of the body.  Who should have screening?  All adults starting at age 50 until age 75 should have screening. Your health care provider may recommend screening at age 45. You will have tests every 1-10 years, depending on your results and the type of screening test.  You may have screening tests starting at an earlier age, or more frequently than other people, if you have any of the following risk factors:  · A personal or family history of colorectal cancer or abnormal growths (polyps).  · Inflammatory bowel disease, such as ulcerative colitis or Crohn's disease.  · A history of having radiation treatment to the abdomen or pelvic area for cancer.  · Colorectal cancer symptoms, such as  changes in bowel habits or blood in your stool.  · A type of colon cancer syndrome that is passed from parent to child (hereditary), such as:  ? Centeno syndrome.  ? Familial adenomatous polyposis.  ? Turcot syndrome.  ? Peutz-Jeghers syndrome.  Screening recommendations for adults who are 75-85 years old vary depending on health.  How is screening done?  There are several types of colorectal screening tests. You may have one or more of the following:  · Guaiac-based fecal occult blood testing. For this test, a stool (feces) sample is checked for hidden (occult) blood, which could be a sign of colorectal cancer.  · Fecal immunochemical test (FIT). For this test, a stool sample is checked for blood, which could be a sign of colorectal cancer.  · Stool DNA test. For this test, a stool sample is checked for blood and changes in DNA that could lead to colorectal cancer.  · Sigmoidoscopy. During this test, a thin, flexible tube with a camera on the end (sigmoidoscope) is used to examine the rectum and the lower colon.  · Colonoscopy. During this test, a long, flexible tube with a camera on the end (colonoscope) is used to examine the entire colon and rectum. With a colonoscopy, it is possible to take a sample of tissue (biopsy) and remove small polyps during the test.  · Virtual colonoscopy. Instead of a colonoscope, this type of colonoscopy uses X-rays (CT scan) and computers to produce images of the colon and rectum.  What are the benefits of screening?  Screening reduces your risk for colorectal cancer and can help identify cancer at an early stage, when the cancer can be removed or treated more easily. It is common for polyps to form in the lining of the colon, especially as you age. These polyps may be cancerous or become cancerous over time. Screening can identify these polyps.  What are the risks of screening?  Each screening test may have different risks.  · Stool sample tests have fewer risks than other types of  screening tests. However, you may need more tests to confirm results from a stool sample test.  · Screening tests that involve X-rays expose you to low levels of radiation, which may slightly increase your cancer risk. The benefit of detecting cancer outweighs the slight increase in risk.  · Screening tests such as sigmoidoscopy and colonoscopy may place you at risk for bleeding, intestinal damage, infection, or a reaction to medicines given during the exam.  Talk with your health care provider to understand your risk for colorectal cancer and to make a screening plan that is right for you.  Questions to ask your health care provider  · When should I start colorectal cancer screening?  · What is my risk for colorectal cancer?  · How often do I need screening?  · Which screening tests do I need?  · How do I get my test results?  · What do my results mean?  Where to find more information  Learn more about colorectal cancer screening from:  · The American Cancer Society: www.cancer.org  · The National Cancer [place]: www.cancer.gov  Summary  · Colorectal cancer screening is a group of tests used to check for colorectal cancer before symptoms develop.  · Screening reduces your risk for colorectal cancer and can help identify cancer at an early stage, when the cancer can be removed or treated more easily.  · All adults starting at age 50 until age 75 should have screening. Your health care provider may recommend screening at age 45.  · You may have screening tests starting at an earlier age, or more frequently than other people, if you have certain risk factors.  · Talk with your health care provider to understand your risk for colorectal cancer and to make a screening plan that is right for you.  This information is not intended to replace advice given to you by your health care provider. Make sure you discuss any questions you have with your health care provider.  Document Released: 06/07/2011 Document Revised:  04/08/2020 Document Reviewed: 09/19/2018  Elsevier Patient Education © 2020 [employer]      Osteoporosis    Osteoporosis is thinning and loss of density in your bones. Osteoporosis makes bones more brittle and fragile and more likely to break (fracture). Over time, osteoporosis can cause your bones to become so weak that they fracture after a minor fall. Bones in the hip, wrist, and spine are most likely to fracture due to osteoporosis.  What are the causes?  The exact cause of this condition is not known.  What increases the risk?  You may be at greater risk for osteoporosis if you:  · Have a family history of the condition.  · Have poor nutrition.  · Use steroid medicines, such as prednisone.  · Are female.  · Are age 50 or older.  · Smoke or have a history of smoking.  · Are not physically active (are sedentary).  · Are white () or of  descent.  · Have a small body frame.  · Take certain medicines, such as antiseizure medicines.  What are the signs or symptoms?  A fracture might be the first sign of osteoporosis, especially if the fracture results from a fall or injury that usually would not cause a bone to break. Other signs and symptoms include:  · Pain in the neck or low back.  · Stooped posture.  · Loss of height.  How is this diagnosed?  This condition may be diagnosed based on:  · Your medical history.  · A physical exam.  · A bone mineral density test, also called a DXA or DEXA test (dual-energy X-ray absorptiometry test). This test uses X-rays to measure the amount of minerals in your bones.  How is this treated?  The goal of treatment is to strengthen your bones and lower your risk for a fracture. Treatment may involve:  · Making lifestyle changes, such as:  ? Including foods with more calcium and vitamin D in your diet.  ? Doing weight-bearing and muscle-strengthening exercises.  ? Stopping tobacco use.  ? Limiting alcohol intake.  · Taking medicine to slow the process of bone loss or  to increase bone density.  · Taking daily supplements of calcium and vitamin D.  · Taking hormone replacement medicines, such as estrogen for women and testosterone for men.  · Monitoring your levels of calcium and vitamin D.  Follow these instructions at home:    Activity  · Exercise as told by your health care provider. Ask your health care provider what exercises and activities are safe for you. You should do:  ? Exercises that make you work against gravity (weight-bearing exercises), such as elías chi, yoga, or walking.  ? Exercises to strengthen muscles, such as lifting weights.  Lifestyle  · Limit alcohol intake to no more than 1 drink a day for nonpregnant women and 2 drinks a day for men. One drink equals 12 oz of beer, 5 oz of wine, or 1½ oz of hard liquor.  · Do not use any products that contain nicotine or tobacco, such as cigarettes and e-cigarettes. If you need help quitting, ask your health care provider.  Preventing falls  · Use devices to help you move around (mobility aids) as needed, such as canes, walkers, scooters, or crutches.  · Keep rooms well-lit and clutter-free.  · Remove tripping hazards from walkways, including cords and throw rugs.  · Install grab bars in bathrooms and safety rails on stairs.  · Use rubber mats in the bathroom and other areas that are often wet or slippery.  · Wear closed-toe shoes that fit well and support your feet. Wear shoes that have rubber soles or low heels.  · Review your medicines with your health care provider. Some medicines can cause dizziness or changes in blood pressure, which can increase your risk of falling.  General instructions  · Include calcium and vitamin D in your diet. Calcium is important for bone health, and vitamin D helps your body to absorb calcium. Good sources of calcium and vitamin D include:  ? Certain fatty fish, such as salmon and tuna.  ? Products that have calcium and vitamin D added to them (fortified products), such as fortified  cereals.  ? Egg yolks.  ? Cheese.  ? Liver.  · Take over-the-counter and prescription medicines only as told by your health care provider.  · Keep all follow-up visits as told by your health care provider. This is important.  Contact a health care provider if:  · You have never been screened for osteoporosis and you are:  ? A woman who is age 65 or older.  ? A man who is age 70 or older.  Get help right away if:  · You fall or injure yourself.  Summary  · Osteoporosis is thinning and loss of density in your bones. This makes bones more brittle and fragile and more likely to break (fracture),even with minor falls.  · The goal of treatment is to strengthen your bones and reduce your risk for a fracture.  · Include calcium and vitamin D in your diet. Calcium is important for bone health, and vitamin D helps your body to absorb calcium.  · Talk with your health care provider about screening for osteoporosis if you are a woman who is age 65 or older, or a man who is age 70 or older.  This information is not intended to replace advice given to you by your health care provider. Make sure you discuss any questions you have with your health care provider.  Document Released: 09/27/2006 Document Revised: 11/30/2018 Document Reviewed: 10/12/2018  Elsevier Patient Education © 2020 [employer]

## 2020-09-12 VITALS
WEIGHT: 170 LBS | SYSTOLIC BLOOD PRESSURE: 122 MMHG | OXYGEN SATURATION: 94 % | BODY MASS INDEX: 30.12 KG/M2 | DIASTOLIC BLOOD PRESSURE: 65 MMHG | HEIGHT: 63 IN | HEART RATE: 53 BPM | RESPIRATION RATE: 14 BRPM | TEMPERATURE: 97.2 F

## 2020-09-12 LAB
25(OH)D3+25(OH)D2 SERPL-MCNC: 28.6 NG/ML (ref 30–100)
ALBUMIN SERPL-MCNC: 4.4 G/DL (ref 3.5–5.2)
ALBUMIN/GLOB SERPL: 2 G/DL
ALP SERPL-CCNC: 46 U/L (ref 39–117)
ALT SERPL-CCNC: 37 U/L (ref 1–41)
AST SERPL-CCNC: 34 U/L (ref 1–40)
BASOPHILS # BLD AUTO: 0.05 10*3/MM3 (ref 0–0.2)
BASOPHILS NFR BLD AUTO: 0.8 % (ref 0–1.5)
BILIRUB SERPL-MCNC: 0.3 MG/DL (ref 0–1.2)
BUN SERPL-MCNC: 16 MG/DL (ref 8–23)
BUN/CREAT SERPL: 14.8 (ref 7–25)
CALCIUM SERPL-MCNC: 9.5 MG/DL (ref 8.6–10.5)
CHLORIDE SERPL-SCNC: 104 MMOL/L (ref 98–107)
CHOLEST SERPL-MCNC: 151 MG/DL (ref 0–200)
CO2 SERPL-SCNC: 25.6 MMOL/L (ref 22–29)
CREAT SERPL-MCNC: 1.08 MG/DL (ref 0.76–1.27)
EOSINOPHIL # BLD AUTO: 0.52 10*3/MM3 (ref 0–0.4)
EOSINOPHIL NFR BLD AUTO: 8.4 % (ref 0.3–6.2)
ERYTHROCYTE [DISTWIDTH] IN BLOOD BY AUTOMATED COUNT: 13.6 % (ref 12.3–15.4)
GLOBULIN SER CALC-MCNC: 2.2 GM/DL
GLUCOSE SERPL-MCNC: 119 MG/DL (ref 65–99)
HBA1C MFR BLD: 6.62 % (ref 4.8–5.6)
HCT VFR BLD AUTO: 45 % (ref 37.5–51)
HCV AB S/CO SERPL IA: <0.1 S/CO RATIO (ref 0–0.9)
HDLC SERPL-MCNC: 55 MG/DL (ref 40–60)
HGB BLD-MCNC: 15 G/DL (ref 13–17.7)
IMM GRANULOCYTES # BLD AUTO: 0.02 10*3/MM3 (ref 0–0.05)
IMM GRANULOCYTES NFR BLD AUTO: 0.3 % (ref 0–0.5)
LDLC SERPL CALC-MCNC: 37 MG/DL (ref 0–100)
LYMPHOCYTES # BLD AUTO: 1.97 10*3/MM3 (ref 0.7–3.1)
LYMPHOCYTES NFR BLD AUTO: 31.9 % (ref 19.6–45.3)
MCH RBC QN AUTO: 30.3 PG (ref 26.6–33)
MCHC RBC AUTO-ENTMCNC: 33.3 G/DL (ref 31.5–35.7)
MCV RBC AUTO: 90.9 FL (ref 79–97)
MONOCYTES # BLD AUTO: 0.46 10*3/MM3 (ref 0.1–0.9)
MONOCYTES NFR BLD AUTO: 7.5 % (ref 5–12)
NEUTROPHILS # BLD AUTO: 3.15 10*3/MM3 (ref 1.7–7)
NEUTROPHILS NFR BLD AUTO: 51.1 % (ref 42.7–76)
NRBC BLD AUTO-RTO: 0 /100 WBC (ref 0–0.2)
PLATELET # BLD AUTO: 168 10*3/MM3 (ref 140–450)
POTASSIUM SERPL-SCNC: 4.9 MMOL/L (ref 3.5–5.2)
PROT SERPL-MCNC: 6.6 G/DL (ref 6–8.5)
RBC # BLD AUTO: 4.95 10*6/MM3 (ref 4.14–5.8)
SODIUM SERPL-SCNC: 141 MMOL/L (ref 136–145)
TRIGL SERPL-MCNC: 295 MG/DL (ref 0–150)
TSH SERPL DL<=0.005 MIU/L-ACNC: 2.8 UIU/ML (ref 0.27–4.2)
VIT B12 SERPL-MCNC: 646 PG/ML (ref 211–946)
VLDLC SERPL CALC-MCNC: 59 MG/DL
WBC # BLD AUTO: 6.17 10*3/MM3 (ref 3.4–10.8)

## 2020-10-19 DIAGNOSIS — J30.9 CHRONIC ALLERGIC RHINITIS: ICD-10-CM

## 2020-10-19 DIAGNOSIS — F51.04 CHRONIC INSOMNIA: ICD-10-CM

## 2020-10-19 RX ORDER — HYDROXYZINE PAMOATE 50 MG/1
CAPSULE ORAL
Qty: 120 CAPSULE | Refills: 5 | Status: SHIPPED | OUTPATIENT
Start: 2020-10-19 | End: 2021-08-16

## 2020-11-04 DIAGNOSIS — I10 ESSENTIAL HYPERTENSION: ICD-10-CM

## 2020-11-04 RX ORDER — LISINOPRIL 10 MG/1
TABLET ORAL
Qty: 30 TABLET | Refills: 5 | Status: SHIPPED | OUTPATIENT
Start: 2020-11-04 | End: 2021-05-03

## 2020-11-17 ENCOUNTER — OFFICE VISIT (OUTPATIENT)
Dept: FAMILY MEDICINE CLINIC | Facility: CLINIC | Age: 80
End: 2020-11-17

## 2020-11-17 DIAGNOSIS — I10 ESSENTIAL HYPERTENSION: ICD-10-CM

## 2020-11-17 DIAGNOSIS — Z51.89 WOUND CHECK, ABSCESS: ICD-10-CM

## 2020-11-17 DIAGNOSIS — E53.8 VITAMIN B 12 DEFICIENCY: ICD-10-CM

## 2020-11-17 DIAGNOSIS — E11.42 TYPE 2 DIABETES MELLITUS WITH PERIPHERAL NEUROPATHY (HCC): ICD-10-CM

## 2020-11-17 DIAGNOSIS — Z87.891 FORMER SMOKER: ICD-10-CM

## 2020-11-17 DIAGNOSIS — E78.2 MIXED HYPERLIPIDEMIA: Primary | ICD-10-CM

## 2020-11-17 DIAGNOSIS — J44.9 COPD MIXED TYPE (HCC): ICD-10-CM

## 2020-11-17 DIAGNOSIS — Z00.00 HEALTHCARE MAINTENANCE: ICD-10-CM

## 2020-11-17 DIAGNOSIS — L02.91 ABSCESS: ICD-10-CM

## 2020-11-17 DIAGNOSIS — E55.9 VITAMIN D DEFICIENCY: ICD-10-CM

## 2020-11-17 PROCEDURE — 10060 I&D ABSCESS SIMPLE/SINGLE: CPT | Performed by: GENERAL PRACTICE

## 2020-11-17 PROCEDURE — 99214 OFFICE O/P EST MOD 30 MIN: CPT | Performed by: GENERAL PRACTICE

## 2020-11-17 PROCEDURE — 87077 CULTURE AEROBIC IDENTIFY: CPT | Performed by: GENERAL PRACTICE

## 2020-11-17 PROCEDURE — 87186 SC STD MICRODIL/AGAR DIL: CPT | Performed by: GENERAL PRACTICE

## 2020-11-17 PROCEDURE — 87070 CULTURE OTHR SPECIMN AEROBIC: CPT | Performed by: GENERAL PRACTICE

## 2020-11-17 PROCEDURE — 87205 SMEAR GRAM STAIN: CPT | Performed by: GENERAL PRACTICE

## 2020-11-17 RX ORDER — SULFAMETHOXAZOLE AND TRIMETHOPRIM 800; 160 MG/1; MG/1
1 TABLET ORAL 2 TIMES DAILY
Qty: 20 TABLET | Refills: 0 | Status: SHIPPED | OUTPATIENT
Start: 2020-11-17 | End: 2020-11-27

## 2020-11-17 NOTE — PROGRESS NOTES
Subjective   Joselito Merida is a 80 y.o. male.     History of Present Illness     Skin Lesion  He gives a 4 to 5 day history of increasing pain, redness, and swelling about his right mid back.  Over the last several days he has noted increased fatigue.  There is no history of any cold or flulike symptoms and he denies any fever or chills.    Diabetes  Current symptoms include paresthesia of the feet. Patient denies visual disturbances, polydipsia, polyuria, hypoglycemia or foot ulcerations. Evaluation to date has been: hemoglobin A1C. Home sugars: patient does not check sugars. Current treatments: metformin. He denies any apparent side effects  Lab Results   Component Value Date    HGBA1C 6.62 (H) 09/11/2020     Dyslipidemia  Compliance with treatment has been fair. The patient exercises intermittently. He remains on pravastatin with no apparent side effects  Lab Results   Component Value Date    CHOL 156 03/24/2017    CHLPL 151 09/11/2020    TRIG 295 (H) 09/11/2020    HDL 55 09/11/2020    LDL 37 09/11/2020     Hypertension  Home blood pressure readings: not doing. Associated signs and symptoms: dyspnea. Patient denies: chest pain, palpitations, orthopnea, paroxysmal nocturnal dyspnea or peripheral edema. Current antihypertensive medications includes lisinopril. Medication compliance: taking as prescribed.   Lab Results   Component Value Date    GLUCOSE 146 (H) 03/24/2017    BUN 16 09/11/2020    CREATININE 1.08 09/11/2020    EGFRIFNONA 66 09/11/2020    EGFRIFAFRI 80 09/11/2020    BCR 14.8 09/11/2020    K 4.9 09/11/2020    CO2 25.6 09/11/2020    CALCIUM 9.5 09/11/2020    PROTENTOTREF 6.6 09/11/2020    ALBUMIN 4.40 09/11/2020    LABIL2 2.0 09/11/2020    AST 34 09/11/2020    ALT 37 09/11/2020     COPD  He complains of persistent shortness of breath with exertion associated with an intermittent dry cough.  He continues to experience intermittent rhinorrhea and nasal congestion.  There is no history of any other upper  respiratory tract symptoms and he continues to deny any chest pain, hemoptysis, fever or chills. He states he is able to walk on level ground with no symptoms but continues to have trouble with inclines, stairs, or lifting. He is following the treatment plan, including medications as directed. He remains on breo or trelegy generally provided through samples.  He has not required a rescue inhaler for some time. He has a smoking history of 40 pack years.    Labs  Most recent vitamin D 28.6 with a B12 of 646.  He is taking supplemental B12 but not vitamin D    The following portions of the patient's history were reviewed and updated as appropriate: allergies, current medications, past medical history, past social history and problem list.    Review of Systems   Constitutional: Positive for fatigue. Negative for appetite change, chills, fever and unexpected weight change.   HENT: Positive for congestion, rhinorrhea and sneezing. Negative for ear pain, postnasal drip, sinus pressure and sore throat.    Eyes: Negative for visual disturbance.   Respiratory: Positive for cough and shortness of breath. Negative for wheezing.    Cardiovascular: Negative for chest pain, palpitations and leg swelling.   Gastrointestinal: Negative for abdominal pain, blood in stool, constipation, diarrhea, nausea and vomiting.   Endocrine: Negative for polydipsia and polyuria.   Genitourinary: Negative for dysuria, frequency, hematuria and urgency.        Nocturia with decreased stream and occasional sense of incomplete voiding   Musculoskeletal: Negative for arthralgias, back pain and myalgias.   Skin: Positive for color change (right mid back). Negative for rash.   Neurological: Positive for numbness. Negative for weakness and headaches.   Psychiatric/Behavioral: Positive for sleep disturbance. Negative for dysphoric mood and suicidal ideas. The patient is not nervous/anxious.      Objective   Physical Exam  Constitutional:       General: He  is not in acute distress.     Appearance: Normal appearance. He is well-developed. He is not ill-appearing or diaphoretic.      Comments: Bright and in fair spirits. No apparent distress. No pallor, jaundice, diaphoresis, or cyanosis.   HENT:      Head: Atraumatic.      Right Ear: Tympanic membrane, ear canal and external ear normal.      Left Ear: Tympanic membrane, ear canal and external ear normal.   Eyes:      Conjunctiva/sclera: Conjunctivae normal.   Neck:      Thyroid: No thyroid mass or thyromegaly.      Vascular: No carotid bruit or JVD.      Trachea: Trachea normal. No tracheal deviation.   Cardiovascular:      Rate and Rhythm: Normal rate and regular rhythm.      Heart sounds: Normal heart sounds, S1 normal and S2 normal. No murmur. No gallop. No S3 or S4 sounds.    Pulmonary:      Effort: Pulmonary effort is normal.      Breath sounds: Examination of the right-lower field reveals decreased breath sounds. Examination of the left-lower field reveals decreased breath sounds. Decreased breath sounds and wheezing (diffuse - mild) present. No rales.      Comments: Pulmonary hyperinflation  Abdominal:      General: Bowel sounds are normal. There is no distension.      Palpations: Abdomen is soft. There is no hepatomegaly, splenomegaly or mass.      Tenderness: There is no abdominal tenderness.      Hernia: No hernia is present.   Musculoskeletal:      Right lower leg: No edema.      Left lower leg: No edema.   Lymphadenopathy:      Head:      Right side of head: No submental, submandibular, tonsillar, preauricular, posterior auricular or occipital adenopathy.      Left side of head: No submental, submandibular, tonsillar, preauricular, posterior auricular or occipital adenopathy.      Cervical: No cervical adenopathy.      Upper Body:      Right upper body: No supraclavicular adenopathy.      Left upper body: No supraclavicular adenopathy.   Skin:     General: Skin is warm and dry.      Coloration: Skin is not  cyanotic, jaundiced or pale.      Findings: No rash.      Nails: There is no clubbing.        Comments: 10 cm oval area of bright erythema and edema right mid back.  Indurated with 4 cm oval area of central fluctuance.  Several small sinuses from which purulent material expressed.  Tender and slightly warm   Neurological:      Mental Status: He is alert and oriented to person, place, and time.      Cranial Nerves: No cranial nerve deficit.      Sensory: Sensory deficit (decreased vibration sense both feet) present.      Motor: No tremor.      Coordination: Coordination normal.      Gait: Gait normal.   Psychiatric:         Attention and Perception: Attention normal.         Mood and Affect: Mood normal.         Speech: Speech normal.         Behavior: Behavior normal.       Assessment/Plan   Problems Addressed this Visit        Cardiovascular and Mediastinum    Essential hypertension   Hypertension: at goal. Evidence of target organ damage: none.  Encouraged to continue to work on diet and exercise plan.   Continue current medication    Mixed hyperlipidemia   As above.   Continue current medication.       Respiratory    COPD mixed type (CMS/HCC)   COPD is stable.  Encouraged to remain as active as symptoms allow for  Continue current medication       Digestive    Vitamin B 12 deficiency  Continue supplementation with monitoring.    Vitamin D deficiency  Encouraged to resume vitamin D 1000 daily       Endocrine    Type 2 diabetes mellitus with peripheral neuropathy (CMS/HCC)  Diabetes mellitus Type II, under excellent control.   Encouraged to continue to pursue ADA diet  Encouraged aerobic exercise.  Continue current medication       Other    Abscess  Recommended incision and drainage.  Reviewed the potential risks including damage to underlying structures and bleeding.  Patient wished to proceed  Overlying skin anesthetized with lidocaine 1% without and a 3 cm incision made with drainage of purulent and then  sebaceous material.  Wound packed with Nu Gauze and a dressing applied.  We will start on oral sulfamethoxazole-trimethoprim and ask home health to change his packing daily.  Encouraged to report if any worse or if any new symptoms or concerns whatsoever    Relevant Medications    sulfamethoxazole-trimethoprim (BACTRIM DS,SEPTRA DS) 800-160 MG per tablet    Other Relevant Orders    Wound Culture - Wound, Back (Completed)    Former smoker    Healthcare maintenance  Patient has already received a flu shot fall.         Diagnoses       Codes Comments    Mixed hyperlipidemia    -  Primary ICD-10-CM: E78.2  ICD-9-CM: 272.2     Essential hypertension     ICD-10-CM: I10  ICD-9-CM: 401.9     COPD mixed type (CMS/HCC)     ICD-10-CM: J44.9  ICD-9-CM: 496     Vitamin D deficiency     ICD-10-CM: E55.9  ICD-9-CM: 268.9     Vitamin B 12 deficiency     ICD-10-CM: E53.8  ICD-9-CM: 266.2     Type 2 diabetes mellitus with peripheral neuropathy (CMS/HCC)     ICD-10-CM: E11.42  ICD-9-CM: 250.60, 357.2     Healthcare maintenance     ICD-10-CM: Z00.00  ICD-9-CM: V70.0     Former smoker     ICD-10-CM: Z87.891  ICD-9-CM: V15.82     Wound check, abscess     ICD-10-CM: Z51.89  ICD-9-CM: V58.89     Abscess     ICD-10-CM: L02.91  ICD-9-CM: 682.9

## 2020-11-18 VITALS
RESPIRATION RATE: 14 BRPM | HEART RATE: 62 BPM | HEIGHT: 63 IN | TEMPERATURE: 97.7 F | BODY MASS INDEX: 28.7 KG/M2 | SYSTOLIC BLOOD PRESSURE: 128 MMHG | OXYGEN SATURATION: 92 % | DIASTOLIC BLOOD PRESSURE: 78 MMHG | WEIGHT: 162 LBS

## 2020-11-21 LAB
BACTERIA SPEC AEROBE CULT: ABNORMAL
GRAM STN SPEC: ABNORMAL

## 2021-01-08 DIAGNOSIS — E11.42 TYPE 2 DIABETES MELLITUS WITH PERIPHERAL NEUROPATHY (HCC): ICD-10-CM

## 2021-01-26 ENCOUNTER — OFFICE VISIT (OUTPATIENT)
Dept: FAMILY MEDICINE CLINIC | Facility: CLINIC | Age: 81
End: 2021-01-26

## 2021-01-26 DIAGNOSIS — F51.04 CHRONIC INSOMNIA: ICD-10-CM

## 2021-01-26 DIAGNOSIS — Z87.891 FORMER SMOKER: ICD-10-CM

## 2021-01-26 DIAGNOSIS — N40.1 BENIGN NON-NODULAR PROSTATIC HYPERPLASIA WITH LOWER URINARY TRACT SYMPTOMS: ICD-10-CM

## 2021-01-26 DIAGNOSIS — E11.42 TYPE 2 DIABETES MELLITUS WITH PERIPHERAL NEUROPATHY (HCC): ICD-10-CM

## 2021-01-26 DIAGNOSIS — E55.9 VITAMIN D DEFICIENCY: ICD-10-CM

## 2021-01-26 DIAGNOSIS — E53.8 VITAMIN B 12 DEFICIENCY: ICD-10-CM

## 2021-01-26 DIAGNOSIS — I10 ESSENTIAL HYPERTENSION: ICD-10-CM

## 2021-01-26 DIAGNOSIS — E78.49 OTHER HYPERLIPIDEMIA: ICD-10-CM

## 2021-01-26 DIAGNOSIS — J44.9 COPD MIXED TYPE (HCC): ICD-10-CM

## 2021-01-26 DIAGNOSIS — K70.0 FATTY LIVER, ALCOHOLIC: ICD-10-CM

## 2021-01-26 DIAGNOSIS — J30.2 CHRONIC SEASONAL ALLERGIC RHINITIS: Primary | ICD-10-CM

## 2021-01-26 DIAGNOSIS — E78.2 MIXED HYPERLIPIDEMIA: ICD-10-CM

## 2021-01-26 DIAGNOSIS — Z00.00 HEALTHCARE MAINTENANCE: ICD-10-CM

## 2021-01-26 DIAGNOSIS — N52.01 ERECTILE DYSFUNCTION DUE TO ARTERIAL INSUFFICIENCY: ICD-10-CM

## 2021-01-26 PROBLEM — L02.91 ABSCESS: Status: RESOLVED | Noted: 2020-11-17 | Resolved: 2021-01-26

## 2021-01-26 PROCEDURE — 99214 OFFICE O/P EST MOD 30 MIN: CPT | Performed by: GENERAL PRACTICE

## 2021-01-26 RX ORDER — SILDENAFIL CITRATE 20 MG/1
TABLET ORAL
Qty: 60 TABLET | Refills: 5 | Status: SHIPPED | OUTPATIENT
Start: 2021-01-26 | End: 2021-10-04 | Stop reason: SDUPTHER

## 2021-01-26 RX ORDER — TAMSULOSIN HYDROCHLORIDE 0.4 MG/1
1 CAPSULE ORAL
Qty: 30 CAPSULE | Refills: 5 | Status: SHIPPED | OUTPATIENT
Start: 2021-01-26 | End: 2021-08-02

## 2021-01-26 RX ORDER — LORAZEPAM 1 MG/1
1 TABLET ORAL NIGHTLY
Qty: 30 TABLET | Refills: 3 | Status: SHIPPED | OUTPATIENT
Start: 2021-01-26 | End: 2021-05-03 | Stop reason: SDUPTHER

## 2021-01-26 RX ORDER — GABAPENTIN 600 MG/1
600 TABLET ORAL DAILY
Qty: 30 TABLET | Refills: 3 | Status: SHIPPED | OUTPATIENT
Start: 2021-01-26 | End: 2021-05-03 | Stop reason: SDUPTHER

## 2021-01-26 RX ORDER — PRAVASTATIN SODIUM 40 MG
40 TABLET ORAL DAILY
Qty: 30 TABLET | Refills: 5 | Status: SHIPPED | OUTPATIENT
Start: 2021-01-26 | End: 2021-08-02

## 2021-01-26 NOTE — PROGRESS NOTES
Subjective   Joselito Merida is a 80 y.o. male.     History of Present Illness     Diabetes  Current symptoms include paresthesia of the feet. Patient denies visual disturbances, polydipsia, polyuria, hypoglycemia or any foot ulcerations. Evaluation to date has been: hemoglobin A1C. Home sugars: patient does not check sugars. Current treatments: metformin.  He is also taking gabapentin for his paresthesias with some improvement    Dyslipidemia  Compliance with treatment has been fair. The patient exercises intermittently. He remains on pravastatin with no apparent side effects    Hypertension  Home blood pressure readings: not doing. Associated signs and symptoms: dyspnea. Patient denies: chest pain, palpitations, orthopnea, paroxysmal nocturnal dyspnea or peripheral edema. Current antihypertensive medications includes lisinopril. Medication compliance: taking as prescribed.     COPD  He complains of persistent shortness of breath with exertion associated with an intermittent dry cough.  He continues to experience intermittent rhinorrhea and nasal congestion.  There is no history of any other upper respiratory tract symptoms and he continues to deny any chest pain, hemoptysis, fever or chills. He states he is able to walk on level ground with no symptoms but continues to have trouble with inclines, stairs, or lifting. He is following the treatment plan, including medications as directed. He remains on breo or trelegy generally provided through samples.  He has not required a rescue inhaler for some time. He has a smoking history of 40 pack years.    Abscess  His wound was packed by home health for several weeks after his I&D.  He has had no problems with the site    The following portions of the patient's history were reviewed and updated as appropriate: allergies, current medications, past medical history, past social history and problem list.    Review of Systems   Constitutional: Positive for fatigue. Negative for  appetite change, chills, fever and unexpected weight change.   HENT: Positive for congestion, rhinorrhea and sneezing. Negative for ear pain, postnasal drip, sinus pressure and sore throat.    Eyes: Negative for visual disturbance.   Respiratory: Positive for cough and shortness of breath. Negative for wheezing.    Cardiovascular: Negative for chest pain, palpitations and leg swelling.   Gastrointestinal: Negative for abdominal pain, blood in stool, constipation, diarrhea, nausea and vomiting.   Endocrine: Negative for polydipsia and polyuria.   Genitourinary: Negative for dysuria, frequency, hematuria and urgency.        Nocturia with decreased stream and occasional sense of incomplete voiding   Musculoskeletal: Negative for arthralgias, back pain and myalgias.   Skin: Negative for rash.   Neurological: Positive for numbness. Negative for weakness and headaches.   Psychiatric/Behavioral: Positive for sleep disturbance. Negative for dysphoric mood. The patient is not nervous/anxious.      Objective   Physical Exam  Constitutional:       General: He is not in acute distress.     Appearance: Normal appearance. He is well-developed. He is not ill-appearing or diaphoretic.      Comments: Bright and in fair spirits. No apparent distress. No pallor, jaundice, diaphoresis, or cyanosis.   HENT:      Head: Atraumatic.      Right Ear: Tympanic membrane, ear canal and external ear normal.      Left Ear: Tympanic membrane, ear canal and external ear normal.   Eyes:      Conjunctiva/sclera: Conjunctivae normal.   Neck:      Thyroid: No thyroid mass or thyromegaly.      Vascular: No carotid bruit or JVD.      Trachea: Trachea normal. No tracheal deviation.   Cardiovascular:      Rate and Rhythm: Normal rate and regular rhythm.      Heart sounds: Normal heart sounds, S1 normal and S2 normal. No murmur. No gallop. No S3 or S4 sounds.    Pulmonary:      Effort: Pulmonary effort is normal.      Breath sounds: Examination of the  right-lower field reveals decreased breath sounds. Examination of the left-lower field reveals decreased breath sounds. Decreased breath sounds and wheezing (diffuse - mild) present. No rales.      Comments: Pulmonary hyperinflation  Abdominal:      General: Bowel sounds are normal. There is no distension.   Musculoskeletal:      Right lower leg: No edema.      Left lower leg: No edema.   Lymphadenopathy:      Head:      Right side of head: No submental, submandibular, tonsillar, preauricular, posterior auricular or occipital adenopathy.      Left side of head: No submental, submandibular, tonsillar, preauricular, posterior auricular or occipital adenopathy.      Cervical: No cervical adenopathy.      Upper Body:      Right upper body: No supraclavicular adenopathy.      Left upper body: No supraclavicular adenopathy.   Skin:     General: Skin is warm and dry.      Coloration: Skin is not cyanotic, jaundiced or pale.      Findings: No rash.      Nails: There is no clubbing.        Comments: Well-healed wound left mid back   Neurological:      Mental Status: He is alert and oriented to person, place, and time.      Cranial Nerves: No cranial nerve deficit.      Sensory: Sensory deficit (decreased vibration sense both feet) present.      Motor: No tremor.      Coordination: Coordination normal.      Gait: Gait normal.   Psychiatric:         Attention and Perception: Attention normal.         Mood and Affect: Mood normal.         Speech: Speech normal.         Behavior: Behavior normal.       Assessment/Plan   Problems Addressed this Visit        Allergies and Adverse Reactions    Chronic seasonal allergic rhinitis        Cardiac and Vasculature    Essential hypertension   Hypertension: at goal. Evidence of target organ damage: none.  Encouraged to continue to work on diet and exercise plan.   Continue current medication    Mixed hyperlipidemia  As above.   Continue current medication.    Relevant Medications     pravastatin (PRAVACHOL) 40 MG tablet       Endocrine and Metabolic    Type 2 diabetes mellitus with peripheral neuropathy (CMS/HCC)  Diabetes mellitus Type II, under excellent control.   Encouraged to continue to pursue ADA diet  Encouraged aerobic exercise.  Continue current medication  Updated labs will be drawn at his return.    Relevant Medications    gabapentin (NEURONTIN) 600 MG tablet    Vitamin B 12 deficiency  Continue supplementation with monitoring.    Vitamin D deficiency  As above.       Gastrointestinal Abdominal     Fatty liver, alcoholic       Genitourinary and Reproductive     Benign non-nodular prostatic hyperplasia with lower urinary tract symptoms    Relevant Medications    tamsulosin (FLOMAX) 0.4 MG capsule 24 hr capsule    Vasculogenic erectile dysfunction    Relevant Medications    sildenafil (REVATIO) 20 MG tablet       Health Encounters    Healthcare maintenance  Encouraged to obtain a COVID-19 immunization when available.  We will discuss an updated Tdap and Shingrix again at his return       Pulmonary and Pneumonias    COPD mixed type (CMS/HCC)   COPD is stable.  Encouraged to remain off cigarettes  Encouraged to remain as active as symptoms allow for       Sleep    Chronic insomnia    Relevant Medications    LORazepam (ATIVAN) 1 MG tablet       Tobacco    Former smoker         Diagnoses       Codes Comments    Chronic seasonal allergic rhinitis    -  Primary ICD-10-CM: J30.2  ICD-9-CM: 477.8     Mixed hyperlipidemia     ICD-10-CM: E78.2  ICD-9-CM: 272.2     Essential hypertension     ICD-10-CM: I10  ICD-9-CM: 401.9     Vitamin D deficiency     ICD-10-CM: E55.9  ICD-9-CM: 268.9     Vitamin B 12 deficiency     ICD-10-CM: E53.8  ICD-9-CM: 266.2     Type 2 diabetes mellitus with peripheral neuropathy (CMS/HCC)     ICD-10-CM: E11.42  ICD-9-CM: 250.60, 357.2     Erectile dysfunction due to arterial insufficiency     ICD-10-CM: N52.01  ICD-9-CM: 607.84     Benign non-nodular prostatic  hyperplasia with lower urinary tract symptoms     ICD-10-CM: N40.1  ICD-9-CM: 600.91     Healthcare maintenance     ICD-10-CM: Z00.00  ICD-9-CM: V70.0     COPD mixed type (CMS/HCC)     ICD-10-CM: J44.9  ICD-9-CM: 496     Chronic insomnia     ICD-10-CM: F51.04  ICD-9-CM: 780.52     Former smoker     ICD-10-CM: Z87.891  ICD-9-CM: V15.82     Other hyperlipidemia     ICD-10-CM: E78.49  ICD-9-CM: 272.4     Fatty liver, alcoholic     ICD-10-CM: K70.0  ICD-9-CM: 571.0

## 2021-01-27 VITALS
WEIGHT: 168 LBS | OXYGEN SATURATION: 90 % | BODY MASS INDEX: 29.77 KG/M2 | RESPIRATION RATE: 14 BRPM | HEIGHT: 63 IN | SYSTOLIC BLOOD PRESSURE: 128 MMHG | HEART RATE: 56 BPM | TEMPERATURE: 96.6 F | DIASTOLIC BLOOD PRESSURE: 64 MMHG

## 2021-04-28 DIAGNOSIS — L30.8 ASTEOTOTIC DERMATITIS: ICD-10-CM

## 2021-04-28 RX ORDER — TRIAMCINOLONE ACETONIDE 1 MG/G
CREAM TOPICAL
Qty: 80 G | Refills: 5 | Status: SHIPPED | OUTPATIENT
Start: 2021-04-28 | End: 2022-08-01 | Stop reason: SDUPTHER

## 2021-05-03 ENCOUNTER — OFFICE VISIT (OUTPATIENT)
Dept: FAMILY MEDICINE CLINIC | Facility: CLINIC | Age: 81
End: 2021-05-03

## 2021-05-03 VITALS
OXYGEN SATURATION: 96 % | SYSTOLIC BLOOD PRESSURE: 162 MMHG | HEIGHT: 63 IN | BODY MASS INDEX: 29.23 KG/M2 | WEIGHT: 165 LBS | HEART RATE: 54 BPM | TEMPERATURE: 97.1 F | RESPIRATION RATE: 14 BRPM | DIASTOLIC BLOOD PRESSURE: 60 MMHG

## 2021-05-03 DIAGNOSIS — Z87.891 FORMER SMOKER: ICD-10-CM

## 2021-05-03 DIAGNOSIS — Z00.00 HEALTHCARE MAINTENANCE: ICD-10-CM

## 2021-05-03 DIAGNOSIS — E55.9 VITAMIN D DEFICIENCY: ICD-10-CM

## 2021-05-03 DIAGNOSIS — E78.2 MIXED HYPERLIPIDEMIA: ICD-10-CM

## 2021-05-03 DIAGNOSIS — N40.1 BENIGN NON-NODULAR PROSTATIC HYPERPLASIA WITH LOWER URINARY TRACT SYMPTOMS: ICD-10-CM

## 2021-05-03 DIAGNOSIS — K70.0 FATTY LIVER, ALCOHOLIC: ICD-10-CM

## 2021-05-03 DIAGNOSIS — N52.01 ERECTILE DYSFUNCTION DUE TO ARTERIAL INSUFFICIENCY: ICD-10-CM

## 2021-05-03 DIAGNOSIS — E11.42 TYPE 2 DIABETES MELLITUS WITH PERIPHERAL NEUROPATHY (HCC): ICD-10-CM

## 2021-05-03 DIAGNOSIS — I10 ESSENTIAL HYPERTENSION: ICD-10-CM

## 2021-05-03 DIAGNOSIS — F51.04 CHRONIC INSOMNIA: ICD-10-CM

## 2021-05-03 DIAGNOSIS — J30.2 CHRONIC SEASONAL ALLERGIC RHINITIS: Primary | ICD-10-CM

## 2021-05-03 DIAGNOSIS — J44.9 COPD MIXED TYPE (HCC): ICD-10-CM

## 2021-05-03 DIAGNOSIS — E53.8 VITAMIN B 12 DEFICIENCY: ICD-10-CM

## 2021-05-03 PROBLEM — D36.9 DERMOID CYST: Status: RESOLVED | Noted: 2018-08-24 | Resolved: 2021-05-03

## 2021-05-03 PROCEDURE — 99214 OFFICE O/P EST MOD 30 MIN: CPT | Performed by: GENERAL PRACTICE

## 2021-05-03 RX ORDER — LORAZEPAM 1 MG/1
1 TABLET ORAL NIGHTLY
Qty: 30 TABLET | Refills: 3 | Status: SHIPPED | OUTPATIENT
Start: 2021-05-03 | End: 2021-10-04 | Stop reason: SDUPTHER

## 2021-05-03 RX ORDER — GABAPENTIN 600 MG/1
600 TABLET ORAL DAILY
Qty: 30 TABLET | Refills: 3 | Status: SHIPPED | OUTPATIENT
Start: 2021-05-03 | End: 2021-10-04 | Stop reason: SDUPTHER

## 2021-05-03 RX ORDER — LISINOPRIL 10 MG/1
TABLET ORAL
Qty: 30 TABLET | Refills: 5 | Status: SHIPPED | OUTPATIENT
Start: 2021-05-03 | End: 2021-10-04 | Stop reason: SDUPTHER

## 2021-05-03 NOTE — PROGRESS NOTES
Subjective   Joselito Merida is a 80 y.o. male.     History of Present Illness     Diabetes  Current symptoms include paresthesia of the feet.  He continues to deny any visual disturbances, polydipsia, polyuria, hypoglycemia or foot ulcerations. Evaluation to date has been: hemoglobin A1C. Home sugars: patient does not check sugars. Current treatments: metformin.  He remains on gabapentin for his paresthesias with some improvement.  His last diabetic eye exam was approximately 2 years ago    Dyslipidemia  Compliance with treatment has been fair. The patient exercises intermittently. He remains on pravastatin with no apparent side effects    Hypertension  Home blood pressure readings: not doing. Associated signs and symptoms: dyspnea.  He continues to deny any chest pain, palpitations, orthopnea, paroxysmal nocturnal dyspnea or peripheral edema. Current antihypertensive medications includes lisinopril. Medication compliance: taking as prescribed.     COPD  He complains of persistent shortness of breath with exertion associated with an intermittent dry cough.  He continues to experience intermittent rhinorrhea and nasal congestion.  There is no history of any other upper respiratory tract symptoms and he continues to deny any chest pain, hemoptysis, fever or chills. He states he is able to walk on level ground with no symptoms but continues to have trouble with inclines, stairs, or lifting. He is following the treatment plan, including medications as directed. He remains on breo or trelegy generally provided through samples.  He has not required a rescue inhaler for some time. He has a smoking history of 40 pack years.    The following portions of the patient's history were reviewed and updated as appropriate: allergies, current medications, past medical history, past social history and problem list.    Review of Systems   Constitutional: Positive for fatigue. Negative for appetite change, chills, fever and unexpected  weight change.   HENT: Positive for congestion, rhinorrhea and sneezing. Negative for ear pain, postnasal drip, sinus pressure and sore throat.    Eyes: Negative for visual disturbance.   Respiratory: Positive for cough and shortness of breath. Negative for wheezing.    Cardiovascular: Negative for chest pain, palpitations and leg swelling.   Gastrointestinal: Negative for abdominal pain, blood in stool, constipation, diarrhea, nausea and vomiting.   Endocrine: Negative for polydipsia and polyuria.   Genitourinary: Negative for dysuria, frequency, hematuria and urgency.        Nocturia with decreased stream and occasional sense of incomplete voiding   Musculoskeletal: Negative for arthralgias, back pain and myalgias.   Skin: Negative for rash.   Neurological: Positive for numbness. Negative for weakness and headaches.   Psychiatric/Behavioral: Positive for sleep disturbance. Negative for dysphoric mood. The patient is not nervous/anxious.      Objective   Physical Exam  Constitutional:       General: He is not in acute distress.     Appearance: Normal appearance. He is well-developed. He is not ill-appearing or diaphoretic.      Comments: Bright and in fair spirits. No apparent distress. No pallor, jaundice, diaphoresis, or cyanosis.   HENT:      Head: Atraumatic.      Right Ear: Tympanic membrane, ear canal and external ear normal.      Left Ear: Tympanic membrane, ear canal and external ear normal.   Eyes:      Conjunctiva/sclera: Conjunctivae normal.   Neck:      Thyroid: No thyroid mass or thyromegaly.      Vascular: No carotid bruit or JVD.      Trachea: Trachea normal. No tracheal deviation.   Cardiovascular:      Rate and Rhythm: Normal rate and regular rhythm.      Heart sounds: Normal heart sounds, S1 normal and S2 normal. No murmur heard.   No gallop. No S3 or S4 sounds.    Pulmonary:      Effort: Pulmonary effort is normal.      Breath sounds: Examination of the right-lower field reveals decreased breath  sounds. Examination of the left-lower field reveals decreased breath sounds. Decreased breath sounds and wheezing (diffuse - mild) present. No rales.      Comments: Pulmonary hyperinflation  Abdominal:      General: Bowel sounds are normal. There is no distension.   Musculoskeletal:      Right lower leg: No edema.      Left lower leg: No edema.   Lymphadenopathy:      Head:      Right side of head: No submental, submandibular, tonsillar, preauricular, posterior auricular or occipital adenopathy.      Left side of head: No submental, submandibular, tonsillar, preauricular, posterior auricular or occipital adenopathy.      Cervical: No cervical adenopathy.      Upper Body:      Right upper body: No supraclavicular adenopathy.      Left upper body: No supraclavicular adenopathy.   Skin:     General: Skin is warm and dry.      Coloration: Skin is not cyanotic, jaundiced or pale.      Findings: No rash.      Nails: There is no clubbing.   Neurological:      Mental Status: He is alert and oriented to person, place, and time.      Cranial Nerves: No cranial nerve deficit.      Sensory: Sensory deficit (decreased vibration sense both feet) present.      Motor: No tremor.      Coordination: Coordination normal.      Gait: Gait normal.   Psychiatric:         Attention and Perception: Attention normal.         Mood and Affect: Mood normal.         Speech: Speech normal.         Behavior: Behavior normal.       Assessment/Plan   Problems Addressed this Visit        Allergies and Adverse Reactions    Chronic seasonal allergic rhinitis  Reminded regarding allergen avoidance.  Continue current medication       Cardiac and Vasculature    Essential hypertension   Hypertension: elevated today. Evidence of target organ damage: none.  Encouraged to continue to work on diet and exercise plan.   Continue current medication for now  If elevated at his return will titrate his lisinopril    Relevant Orders    CBC & Differential     Comprehensive Metabolic Panel    Mixed hyperlipidemia  As above.   Continue current medication.    Relevant Orders    Lipid Panel    TSH       Endocrine and Metabolic    Type 2 diabetes mellitus with peripheral neuropathy (CMS/HCC)  Diabetes mellitus Type II, under unknown control.   Encouraged to continue to pursue ADA diet  Encouraged aerobic exercise.  Continue current medication  Updated labs drawn.    Relevant Orders    Hemoglobin A1c    MicroAlbumin, Urine, Random - Urine, Clean Catch    Vitamin B 12 deficiency  Continue supplementation with monitoring.    Relevant Orders    Vitamin B12    Vitamin D deficiency  As above.    Relevant Orders    Vitamin D 25 Hydroxy       Gastrointestinal Abdominal     Fatty liver, alcoholic       Genitourinary and Reproductive     Benign non-nodular prostatic hyperplasia with lower urinary tract symptoms    Vasculogenic erectile dysfunction       Health Encounters    Healthcare maintenance  Patient has received the [employer] COVID-19 vaccine.  We will discuss an updated Tdap along with Shingrix again at his return       Pulmonary and Pneumonias    COPD mixed type (CMS/HCC)   COPD is stable.  Reminded of the importance of smoking cessation  Encouraged to remain as active as symptoms allow for  Continue current medication       Sleep    Chronic insomnia       Tobacco    Former smoker      Diagnoses       Codes Comments    Chronic seasonal allergic rhinitis    -  Primary ICD-10-CM: J30.2  ICD-9-CM: 477.8     Mixed hyperlipidemia     ICD-10-CM: E78.2  ICD-9-CM: 272.2     Essential hypertension     ICD-10-CM: I10  ICD-9-CM: 401.9     Vitamin D deficiency     ICD-10-CM: E55.9  ICD-9-CM: 268.9     Vitamin B 12 deficiency     ICD-10-CM: E53.8  ICD-9-CM: 266.2     Type 2 diabetes mellitus with peripheral neuropathy (CMS/HCC)     ICD-10-CM: E11.42  ICD-9-CM: 250.60, 357.2     Fatty liver, alcoholic     ICD-10-CM: K70.0  ICD-9-CM: 571.0     Erectile dysfunction due to arterial  insufficiency     ICD-10-CM: N52.01  ICD-9-CM: 607.84     Benign non-nodular prostatic hyperplasia with lower urinary tract symptoms     ICD-10-CM: N40.1  ICD-9-CM: 600.91     Healthcare maintenance     ICD-10-CM: Z00.00  ICD-9-CM: V70.0     COPD mixed type (CMS/HCC)     ICD-10-CM: J44.9  ICD-9-CM: 496     Chronic insomnia     ICD-10-CM: F51.04  ICD-9-CM: 780.52     Former smoker     ICD-10-CM: Z87.891  ICD-9-CM: V15.82

## 2021-05-04 LAB
25(OH)D3+25(OH)D2 SERPL-MCNC: 31.5 NG/ML (ref 30–100)
ALBUMIN SERPL-MCNC: 4.5 G/DL (ref 3.5–5.2)
ALBUMIN/GLOB SERPL: 2 G/DL
ALP SERPL-CCNC: 41 U/L (ref 39–117)
ALT SERPL-CCNC: 42 U/L (ref 1–41)
AST SERPL-CCNC: 36 U/L (ref 1–40)
BASOPHILS # BLD AUTO: 0.05 10*3/MM3 (ref 0–0.2)
BASOPHILS NFR BLD AUTO: 0.8 % (ref 0–1.5)
BILIRUB SERPL-MCNC: 0.3 MG/DL (ref 0–1.2)
BUN SERPL-MCNC: 18 MG/DL (ref 8–23)
BUN/CREAT SERPL: 14.9 (ref 7–25)
CALCIUM SERPL-MCNC: 9.3 MG/DL (ref 8.6–10.5)
CHLORIDE SERPL-SCNC: 102 MMOL/L (ref 98–107)
CHOLEST SERPL-MCNC: 152 MG/DL (ref 0–200)
CO2 SERPL-SCNC: 27 MMOL/L (ref 22–29)
CREAT SERPL-MCNC: 1.21 MG/DL (ref 0.76–1.27)
EOSINOPHIL # BLD AUTO: 0.48 10*3/MM3 (ref 0–0.4)
EOSINOPHIL NFR BLD AUTO: 8.1 % (ref 0.3–6.2)
ERYTHROCYTE [DISTWIDTH] IN BLOOD BY AUTOMATED COUNT: 12.8 % (ref 12.3–15.4)
GLOBULIN SER CALC-MCNC: 2.3 GM/DL
GLUCOSE SERPL-MCNC: 133 MG/DL (ref 65–99)
HBA1C MFR BLD: 6.8 % (ref 4.8–5.6)
HCT VFR BLD AUTO: 39.8 % (ref 37.5–51)
HDLC SERPL-MCNC: 60 MG/DL (ref 40–60)
HGB BLD-MCNC: 13.5 G/DL (ref 13–17.7)
IMM GRANULOCYTES # BLD AUTO: 0.02 10*3/MM3 (ref 0–0.05)
IMM GRANULOCYTES NFR BLD AUTO: 0.3 % (ref 0–0.5)
LDLC SERPL CALC-MCNC: 60 MG/DL (ref 0–100)
LYMPHOCYTES # BLD AUTO: 2.1 10*3/MM3 (ref 0.7–3.1)
LYMPHOCYTES NFR BLD AUTO: 35.6 % (ref 19.6–45.3)
MCH RBC QN AUTO: 28.5 PG (ref 26.6–33)
MCHC RBC AUTO-ENTMCNC: 33.9 G/DL (ref 31.5–35.7)
MCV RBC AUTO: 84 FL (ref 79–97)
MICROALBUMIN UR-MCNC: 41.2 UG/ML
MONOCYTES # BLD AUTO: 0.45 10*3/MM3 (ref 0.1–0.9)
MONOCYTES NFR BLD AUTO: 7.6 % (ref 5–12)
NEUTROPHILS # BLD AUTO: 2.8 10*3/MM3 (ref 1.7–7)
NEUTROPHILS NFR BLD AUTO: 47.6 % (ref 42.7–76)
NRBC BLD AUTO-RTO: 0 /100 WBC (ref 0–0.2)
PLATELET # BLD AUTO: 178 10*3/MM3 (ref 140–450)
POTASSIUM SERPL-SCNC: 5 MMOL/L (ref 3.5–5.2)
PROT SERPL-MCNC: 6.8 G/DL (ref 6–8.5)
RBC # BLD AUTO: 4.74 10*6/MM3 (ref 4.14–5.8)
SODIUM SERPL-SCNC: 141 MMOL/L (ref 136–145)
TRIGL SERPL-MCNC: 195 MG/DL (ref 0–150)
TSH SERPL DL<=0.005 MIU/L-ACNC: 2.6 UIU/ML (ref 0.27–4.2)
VIT B12 SERPL-MCNC: 1134 PG/ML (ref 211–946)
VLDLC SERPL CALC-MCNC: 32 MG/DL (ref 5–40)
WBC # BLD AUTO: 5.9 10*3/MM3 (ref 3.4–10.8)

## 2021-08-02 DIAGNOSIS — N40.1 BENIGN NON-NODULAR PROSTATIC HYPERPLASIA WITH LOWER URINARY TRACT SYMPTOMS: ICD-10-CM

## 2021-08-02 DIAGNOSIS — E78.49 OTHER HYPERLIPIDEMIA: ICD-10-CM

## 2021-08-02 RX ORDER — PRAVASTATIN SODIUM 40 MG
TABLET ORAL
Qty: 30 TABLET | Refills: 5 | Status: SHIPPED | OUTPATIENT
Start: 2021-08-02 | End: 2021-10-04 | Stop reason: SDUPTHER

## 2021-08-02 RX ORDER — TAMSULOSIN HYDROCHLORIDE 0.4 MG/1
1 CAPSULE ORAL
Qty: 30 CAPSULE | Refills: 5 | Status: SHIPPED | OUTPATIENT
Start: 2021-08-02 | End: 2021-10-04 | Stop reason: SDUPTHER

## 2021-08-10 DIAGNOSIS — E11.42 TYPE 2 DIABETES MELLITUS WITH PERIPHERAL NEUROPATHY (HCC): ICD-10-CM

## 2021-08-16 DIAGNOSIS — J30.9 CHRONIC ALLERGIC RHINITIS: ICD-10-CM

## 2021-08-16 DIAGNOSIS — F51.04 CHRONIC INSOMNIA: ICD-10-CM

## 2021-08-16 RX ORDER — HYDROXYZINE PAMOATE 50 MG/1
CAPSULE ORAL
Qty: 120 CAPSULE | Refills: 5 | Status: SHIPPED | OUTPATIENT
Start: 2021-08-16 | End: 2021-10-04 | Stop reason: SDUPTHER

## 2021-10-04 ENCOUNTER — OFFICE VISIT (OUTPATIENT)
Dept: FAMILY MEDICINE CLINIC | Facility: CLINIC | Age: 81
End: 2021-10-04

## 2021-10-04 VITALS
DIASTOLIC BLOOD PRESSURE: 65 MMHG | HEART RATE: 56 BPM | RESPIRATION RATE: 15 BRPM | WEIGHT: 161 LBS | SYSTOLIC BLOOD PRESSURE: 124 MMHG | OXYGEN SATURATION: 97 % | TEMPERATURE: 97.1 F | BODY MASS INDEX: 28.53 KG/M2 | HEIGHT: 63 IN

## 2021-10-04 DIAGNOSIS — E11.42 TYPE 2 DIABETES MELLITUS WITH PERIPHERAL NEUROPATHY (HCC): ICD-10-CM

## 2021-10-04 DIAGNOSIS — H61.91 SKIN LESION OF RIGHT EAR: ICD-10-CM

## 2021-10-04 DIAGNOSIS — E78.49 OTHER HYPERLIPIDEMIA: ICD-10-CM

## 2021-10-04 DIAGNOSIS — Z87.891 FORMER SMOKER: ICD-10-CM

## 2021-10-04 DIAGNOSIS — Z00.00 HEALTHCARE MAINTENANCE: ICD-10-CM

## 2021-10-04 DIAGNOSIS — K70.0 FATTY LIVER, ALCOHOLIC: ICD-10-CM

## 2021-10-04 DIAGNOSIS — N40.1 BENIGN NON-NODULAR PROSTATIC HYPERPLASIA WITH LOWER URINARY TRACT SYMPTOMS: ICD-10-CM

## 2021-10-04 DIAGNOSIS — F51.04 CHRONIC INSOMNIA: ICD-10-CM

## 2021-10-04 DIAGNOSIS — N52.01 ERECTILE DYSFUNCTION DUE TO ARTERIAL INSUFFICIENCY: ICD-10-CM

## 2021-10-04 DIAGNOSIS — E53.8 VITAMIN B 12 DEFICIENCY: ICD-10-CM

## 2021-10-04 DIAGNOSIS — E78.2 MIXED HYPERLIPIDEMIA: ICD-10-CM

## 2021-10-04 DIAGNOSIS — I10 ESSENTIAL HYPERTENSION: ICD-10-CM

## 2021-10-04 DIAGNOSIS — Z23 ENCOUNTER FOR IMMUNIZATION: ICD-10-CM

## 2021-10-04 DIAGNOSIS — E55.9 VITAMIN D DEFICIENCY: ICD-10-CM

## 2021-10-04 DIAGNOSIS — J44.9 COPD MIXED TYPE (HCC): ICD-10-CM

## 2021-10-04 DIAGNOSIS — J30.9 CHRONIC ALLERGIC RHINITIS: ICD-10-CM

## 2021-10-04 DIAGNOSIS — J30.2 CHRONIC SEASONAL ALLERGIC RHINITIS: Primary | ICD-10-CM

## 2021-10-04 PROBLEM — H61.92 SKIN LESION OF LEFT EAR: Status: RESOLVED | Noted: 2017-12-18 | Resolved: 2021-10-04

## 2021-10-04 PROCEDURE — 1126F AMNT PAIN NOTED NONE PRSNT: CPT | Performed by: GENERAL PRACTICE

## 2021-10-04 PROCEDURE — G0008 ADMIN INFLUENZA VIRUS VAC: HCPCS | Performed by: GENERAL PRACTICE

## 2021-10-04 PROCEDURE — 90662 IIV NO PRSV INCREASED AG IM: CPT | Performed by: GENERAL PRACTICE

## 2021-10-04 PROCEDURE — G0439 PPPS, SUBSEQ VISIT: HCPCS | Performed by: GENERAL PRACTICE

## 2021-10-04 PROCEDURE — 1170F FXNL STATUS ASSESSED: CPT | Performed by: GENERAL PRACTICE

## 2021-10-04 PROCEDURE — 1159F MED LIST DOCD IN RCRD: CPT | Performed by: GENERAL PRACTICE

## 2021-10-04 PROCEDURE — 96160 PT-FOCUSED HLTH RISK ASSMT: CPT | Performed by: GENERAL PRACTICE

## 2021-10-04 RX ORDER — LISINOPRIL 10 MG/1
10 TABLET ORAL DAILY
Qty: 30 TABLET | Refills: 5 | Status: SHIPPED | OUTPATIENT
Start: 2021-10-04 | End: 2022-01-27 | Stop reason: SDUPTHER

## 2021-10-04 RX ORDER — MELATONIN
1000 DAILY
Qty: 30 TABLET | Refills: 5 | Status: SHIPPED | OUTPATIENT
Start: 2021-10-04 | End: 2022-01-27 | Stop reason: SDUPTHER

## 2021-10-04 RX ORDER — PRAVASTATIN SODIUM 40 MG
40 TABLET ORAL DAILY
Qty: 30 TABLET | Refills: 5 | Status: SHIPPED | OUTPATIENT
Start: 2021-10-04 | End: 2022-01-27 | Stop reason: SDUPTHER

## 2021-10-04 RX ORDER — LORAZEPAM 1 MG/1
1 TABLET ORAL NIGHTLY
Qty: 30 TABLET | Refills: 3 | Status: SHIPPED | OUTPATIENT
Start: 2021-10-04 | End: 2021-10-18

## 2021-10-04 RX ORDER — CETIRIZINE HYDROCHLORIDE 10 MG/1
10 TABLET ORAL DAILY
Qty: 30 TABLET | Refills: 5 | Status: SHIPPED | OUTPATIENT
Start: 2021-10-04 | End: 2022-01-27 | Stop reason: SDUPTHER

## 2021-10-04 RX ORDER — ASPIRIN 81 MG/1
81 TABLET ORAL DAILY
Qty: 30 TABLET | Refills: 5 | Status: SHIPPED | OUTPATIENT
Start: 2021-10-04 | End: 2022-01-27 | Stop reason: SDUPTHER

## 2021-10-04 RX ORDER — TAMSULOSIN HYDROCHLORIDE 0.4 MG/1
1 CAPSULE ORAL
Qty: 30 CAPSULE | Refills: 5 | Status: SHIPPED | OUTPATIENT
Start: 2021-10-04 | End: 2022-01-27 | Stop reason: SDUPTHER

## 2021-10-04 RX ORDER — GABAPENTIN 600 MG/1
600 TABLET ORAL DAILY
Qty: 30 TABLET | Refills: 3 | Status: SHIPPED | OUTPATIENT
Start: 2021-10-04 | End: 2021-10-18

## 2021-10-04 RX ORDER — SILDENAFIL CITRATE 20 MG/1
TABLET ORAL
Qty: 60 TABLET | Refills: 5 | Status: SHIPPED | OUTPATIENT
Start: 2021-10-04 | End: 2022-01-27 | Stop reason: SDUPTHER

## 2021-10-04 RX ORDER — HYDROXYZINE PAMOATE 50 MG/1
CAPSULE ORAL
Qty: 120 CAPSULE | Refills: 5 | Status: SHIPPED | OUTPATIENT
Start: 2021-10-04 | End: 2022-01-27 | Stop reason: SDUPTHER

## 2021-10-04 NOTE — PATIENT INSTRUCTIONS
Advance Directive    Advance directives are legal documents that let you make choices ahead of time about your health care and medical treatment in case you become unable to communicate for yourself. Advance directives are a way for you to make known your wishes to family, friends, and health care providers. This can let others know about your end-of-life care if you become unable to communicate.  Discussing and writing advance directives should happen over time rather than all at once. Advance directives can be changed depending on your situation and what you want, even after you have signed the advance directives.  There are different types of advance directives, such as:  · Medical power of .  · Living will.  · Do not resuscitate (DNR) or do not attempt resuscitation (DNAR) order.  Health care proxy and medical power of   A health care proxy is also called a health care agent. This is a person who is appointed to make medical decisions for you in cases where you are unable to make the decisions yourself. Generally, people choose someone they know well and trust to represent their preferences. Make sure to ask this person for an agreement to act as your proxy. A proxy may have to exercise judgment in the event of a medical decision for which your wishes are not known.  A medical power of  is a legal document that names your health care proxy. Depending on the laws in your state, after the document is written, it may also need to be:  · Signed.  · Notarized.  · Dated.  · Copied.  · Witnessed.  · Incorporated into your medical record.  You may also want to appoint someone to manage your money in a situation in which you are unable to do so. This is called a durable power of  for finances. It is a separate legal document from the durable power of  for health care. You may choose the same person or someone different from your health care proxy to act as your agent in money  matters.  If you do not appoint a proxy, or if there is a concern that the proxy is not acting in your best interests, a court may appoint a guardian to act on your behalf.  Living will  A living will is a set of instructions that state your wishes about medical care when you cannot express them yourself. Health care providers should keep a copy of your living will in your medical record. You may want to give a copy to family members or friends. To alert caregivers in case of an emergency, you can place a card in your wallet to let them know that you have a living will and where they can find it. A living will is used if you become:  · Terminally ill.  · Disabled.  · Unable to communicate or make decisions.  Items to consider in your living will include:  · To use or not to use life-support equipment, such as dialysis machines and breathing machines (ventilators).  · A DNR or DNAR order. This tells health care providers not to use cardiopulmonary resuscitation (CPR) if breathing or heartbeat stops.  · To use or not to use tube feeding.  · To be given or not to be given food and fluids.  · Comfort (palliative) care when the goal becomes comfort rather than a cure.  · Donation of organs and tissues.  A living will does not give instructions for distributing your money and property if you should pass away.  DNR or DNAR  A DNR or DNAR order is a request not to have CPR in the event that your heart stops beating or you stop breathing. If a DNR or DNAR order has not been made and shared, a health care provider will try to help any patient whose heart has stopped or who has stopped breathing. If you plan to have surgery, talk with your health care provider about how your DNR or DNAR order will be followed if problems occur.  What if I do not have an advance directive?  If you do not have an advance directive, some states assign family decision makers to act on your behalf based on how closely you are related to them. Each  state has its own laws about advance directives. You may want to check with your health care provider, , or state representative about the laws in your state.  Summary  · Advance directives are the legal documents that allow you to make choices ahead of time about your health care and medical treatment in case you become unable to tell others about your care.  · The process of discussing and writing advance directives should happen over time. You can change the advance directives, even after you have signed them.  · Advance directives include DNR or DNAR orders, living johnson, and designating an agent as your medical power of .  This information is not intended to replace advice given to you by your health care provider. Make sure you discuss any questions you have with your health care provider.  Document Revised: 01/28/2021 Document Reviewed: 07/16/2020  [employer] Patient Education © 2021 "[employer]      Heart Disease Prevention  Heart disease is the leading cause of death in the world. Coronary artery disease is the most common cause of heart disease. This condition results when cholesterol and other substances (plaque) build up inside the walls of the blood vessels that supply your heart muscle (arteries). This buildup in arteries is called atherosclerosis. You can take actions to lower your risk of heart disease.  How can heart disease affect me?  Heart disease can cause many unpleasant symptoms and complications, such as:  · Chest pain (angina).  · Reduced or blocked blood flow to your heart. This can cause:  ? Irregular heartbeats (arrhythmias).  ? Heart attack.  ? Heart failure.  What can increase my risk?  The following factors may make you more likely to develop this condition:  · High blood pressure (hypertension).  · High cholesterol.  · Smoking.  · A diet high in saturated fats or trans fats.  · Lack of physical activity.  · Obesity.  · Drinking too much alcohol.  · Diabetes.  · Having  a family history of heart disease.  What actions can I take to prevent heart disease?  Nutrition    · Eat a heart-healthy eating plan as told by your health care provider. Examples include the DASH (Dietary Approaches to Stop Hypertension) eating plan or the Mediterranean diet.  · Generally, it is recommended that you:  ? Eat less salt (sodium). Ask your health care provider how much sodium is safe for you. Most people should have less than 2,300 mg each day.  ? Limit unhealthy fats, such as saturated and trans fats, in your diet. You can do this by eating low-fat dairy products, eating less red meat, and avoiding processed foods.  ? Eat healthy fats (omega-3 fatty acids). These are found in fish, such as mackerel or salmon.  ? Eat more fruits and vegetables. You should try to fill one-half of your plate with fruits and vegetables at each meal.  ? Eat more whole grains.  ? Avoid foods and drinks that have added sugars.    Lifestyle    · Get regular exercise. This is one of the most important things you can do for your health. Generally, it is recommended that you:  ? Exercise for at least 30 minutes on most days of the week (150 minutes each week). The exercise should increase your heart rate and make you sweat (aerobic exercise).  ? Add strength exercises on at least 2 days each week.  · Do not use any products that contain nicotine or tobacco, such as cigarettes and e-cigarettes. These can damage your heart and blood vessels. If you need help quitting, ask your health care provider.    Alcohol use  · Do not drink alcohol if:  ? Your health care provider tells you not to drink.  ? You are pregnant, may be pregnant, or are planning to become pregnant.  · If you drink alcohol, limit how much you have:  ? 0-1 drink a day for women.  ? 0-2 drinks a day for men.  · Be aware of how much alcohol is in your drink. In the [place] one drink equals one typical bottle of beer (12 oz), one-half glass of wine (5 oz), or one shot  of hard liquor (1½ oz).  Medicines  · Take over-the-counter and prescription medicines only as told by your health care provider.  · Ask your health care provider whether you should take an aspirin every day. Taking aspirin may help reduce your risk of heart disease and stroke.  · Depending on your risk factors, your health care provider may prescribe medicines to lower your risk of heart disease or to control related conditions. You may take medicine to:  ? Lower cholesterol.  ? Control blood pressure.  ? Control diabetes.  General information  · Keep your blood pressure under control, as recommended by your health care provider. For most healthy people, the upper number of your blood pressure (systolic) should be no higher than 120, and the lower number (diastolic) no higher than 80. Treatment may be needed if your blood pressure is higher than 130/80.  · Have your blood pressure checked at least every two years. Your health care provider may check your blood pressure more often if you have high blood pressure.  · After age 20, have your cholesterol checked every 4-6 years. If you have risk factors for heart disease, you may need to have it checked more frequently. Treatment may be needed if your cholesterol is high.  · Have your body mass index (BMI) checked every year. Your health care provider can calculate your BMI from your height and weight.  · Work with your health care provider to lose weight, if needed, or to maintain a healthy weight.  Where to find more information:  · Centers for Disease Control and Prevention: www.cdc.gov/heartdisease  · American Heart Association: www.heart.org  ? Take a free online heart disease risk quiz to better understand your personal risk factors.  Summary  · Heart disease is the leading cause of death in the world.  · Heart disease can cause chest pain, abnormal heart rhythms, heart attack, and heart failure.  · High blood pressure, high cholesterol, and smoking are the main  risk factors for heart disease, although other factors also contribute.  · You can take actions to lower your chances of developing heart disease. Work with your health care provider to reduce your risk by following a heart-healthy diet, being physically active, and controlling your weight, blood pressure, and cholesterol level.  This information is not intended to replace advice given to you by your health care provider. Make sure you discuss any questions you have with your health care provider.  Document Revised: 01/02/2019 Document Reviewed: 01/02/2019  [employer] Patient Education © 2021 [employer]      Fall Prevention in the Home, Adult  Falls can cause injuries. They can happen to people of all ages. There are many things you can do to make your home safe and to help prevent falls. Ask for help when making these changes, if needed.  What actions can I take to prevent falls?  General Instructions  · Use good lighting in all rooms. Replace any light bulbs that burn out.  · Turn on the lights when you go into a dark area. Use night-lights.  · Keep items that you use often in easy-to-reach places. Lower the shelves around your home if necessary.  · Set up your furniture so you have a clear path. Avoid moving your furniture around.  · Do not have throw rugs and other things on the floor that can make you trip.  · Avoid walking on wet floors.  · If any of your floors are uneven, fix them.  · Add color or contrast paint or tape to clearly jeanette and help you see:  ? Any grab bars or handrails.  ? First and last steps of stairways.  ? Where the edge of each step is.  · If you use a stepladder:  ? Make sure that it is fully opened. Do not climb a closed stepladder.  ? Make sure that both sides of the stepladder are locked into place.  ? Ask someone to hold the stepladder for you while you use it.  · If there are any pets around you, be aware of where they are.  What can I do in the bathroom?         · Keep the floor  dry. Clean up any water that spills onto the floor as soon as it happens.  · Remove soap buildup in the tub or shower regularly.  · Use non-skid mats or decals on the floor of the tub or shower.  · Attach bath mats securely with double-sided, non-slip rug tape.  · If you need to sit down in the shower, use a plastic, non-slip stool.  · Install grab bars by the toilet and in the tub and shower. Do not use towel bars as grab bars.  What can I do in the bedroom?  · Make sure that you have a light by your bed that is easy to reach.  · Do not use any sheets or blankets that are too big for your bed. They should not hang down onto the floor.  · Have a firm chair that has side arms. You can use this for support while you get dressed.  What can I do in the kitchen?  · Clean up any spills right away.  · If you need to reach something above you, use a strong step stool that has a grab bar.  · Keep electrical cords out of the way.  · Do not use floor polish or wax that makes floors slippery. If you must use wax, use non-skid floor wax.  What can I do with my stairs?  · Do not leave any items on the stairs.  · Make sure that you have a light switch at the top of the stairs and the bottom of the stairs. If you do not have them, ask someone to add them for you.  · Make sure that there are handrails on both sides of the stairs, and use them. Fix handrails that are broken or loose. Make sure that handrails are as long as the stairways.  · Install non-slip stair treads on all stairs in your home.  · Avoid having throw rugs at the top or bottom of the stairs. If you do have throw rugs, attach them to the floor with carpet tape.  · Choose a carpet that does not hide the edge of the steps on the stairway.  · Check any carpeting to make sure that it is firmly attached to the stairs. Fix any carpet that is loose or worn.  What can I do on the outside of my home?  · Use bright outdoor lighting.  · Regularly fix the edges of walkways and  driveways and fix any cracks.  · Remove anything that might make you trip as you walk through a door, such as a raised step or threshold.  · Trim any bushes or trees on the path to your home.  · Regularly check to see if handrails are loose or broken. Make sure that both sides of any steps have handrails.  · Install guardrails along the edges of any raised decks and porches.  · Clear walking paths of anything that might make someone trip, such as tools or rocks.  · Have any leaves, snow, or ice cleared regularly.  · Use sand or salt on walking paths during winter.  · Clean up any spills in your garage right away. This includes grease or oil spills.  What other actions can I take?  · Wear shoes that:  ? Have a low heel. Do not wear high heels.  ? Have rubber bottoms.  ? Are comfortable and fit you well.  ? Are closed at the toe. Do not wear open-toe sandals.  · Use tools that help you move around (mobility aids) if they are needed. These include:  ? Canes.  ? Walkers.  ? Scooters.  ? Crutches.  · Review your medicines with your doctor. Some medicines can make you feel dizzy. This can increase your chance of falling.  Ask your doctor what other things you can do to help prevent falls.  Where to find more information  · Centers for Disease Control and Prevention, STEADI: https://cdc.gov  · National [place] on Aging: https://mg2rrai.rowan.nih.gov  Contact a doctor if:  · You are afraid of falling at home.  · You feel weak, drowsy, or dizzy at home.  · You fall at home.  Summary  · There are many simple things that you can do to make your home safe and to help prevent falls.  · Ways to make your home safe include removing tripping hazards and installing grab bars in the bathroom.  · Ask for help when making these changes in your home.  This information is not intended to replace advice given to you by your health care provider. Make sure you discuss any questions you have with your health care provider.  Document  "Revised: 04/09/2020 Document Reviewed: 08/02/2018  [employer] Patient Education © 2021 [employer].      https://www.cancer.org/cancer/colon-rectal-cancer/causes-risks-prevention/risk-factors.html\">   Colorectal Cancer Screening    Colorectal cancer screening is a group of tests that are used to check for colorectal cancer before symptoms develop. Colorectal refers to the colon and rectum. The colon and rectum are located at the end of the digestive tract and carry stool (feces) out of the body.  Who should have screening?  All adults who are 45-75 years old should have screening. Your health care provider may recommend screening before age 45. You will have tests every 1-10 years, depending on your results and the type of screening test. Screening recommendations for adults who are 76-85 years old vary depending on a person's health. People older than age 85 should no longer get colorectal cancer screening.  You may have screening tests starting before age 45, or more often than other people, if you have any of these risk factors:  · A personal or family history of colorectal cancer or abnormal growths known as polyps in your colon.  · Inflammatory bowel disease, such as ulcerative colitis or Crohn's disease.  · A history of having radiation treatment to the abdomen or the area between the hip bones (pelvic area) for cancer.  · A type of genetic syndrome that is passed from parent to child (hereditary), such as:  ? Centeno syndrome.  ? Familial adenomatous polyposis.  ? Turcot syndrome.  ? Peutz-Jeghers syndrome.  ? MUTYH-associated polyposis (MAP).  · A personal history of diabetes.  Types of tests  There are several types of colorectal screening tests. You may have one or more of the following:  · Guaiac-based fecal occult blood testing. For this test, a stool sample is checked for hidden (occult) blood, which could be a sign of colorectal cancer.  · Fecal immunochemical test (FIT). For this test, a stool sample is " checked for blood, which could be a sign of colorectal cancer.  · Stool DNA test. For this test, a stool sample is checked for blood and changes in DNA that could lead to colorectal cancer.  · Sigmoidoscopy. During this test, a thin, flexible tube with a camera on the end, called a sigmoidoscope, is used to examine the rectum and the lower colon.  · Colonoscopy. During this test, a long, flexible tube with a camera on the end, called a colonoscope, is used to examine the entire colon and rectum. Also, sometimes a tissue sample is taken to be looked at under a microscope (biopsy) or small polyps are removed during this test.  · Virtual colonoscopy. Instead of a colonoscope, this type of colonoscopy uses a CT scan to take pictures of the colon and rectum. A CT scan is a type of X-ray that is made using computers.  What are the benefits of screening?  Screening reduces your risk for colorectal cancer and can help identify cancer at an early stage, when the cancer can be removed or treated more easily. It is common for polyps to form in the lining of the colon, especially as you age. These polyps may be cancerous or become cancerous over time. Screening can identify these polyps.  What are the risks of screening?  Generally, these are safe tests. However, problems may occur, including:  · The need for more tests to confirm results from a stool sample test. Stool sample tests have fewer risks than other types of screening tests.  · Being exposed to low levels of radiation, if you had a test involving X-rays. This may slightly increase your cancer risk. The benefit of detecting cancer outweighs the slight increase in risk.  · Bleeding, damage to the intestine, or infection caused by a sigmoidoscopy or colonoscopy.  · A reaction to medicines given during a sigmoidoscopy or colonoscopy.  Talk with your health care provider to understand your risk for colorectal cancer and to make a screening plan that is right for  you.  Questions to ask your health care provider  · When should I start colorectal cancer screening?  · What is my risk for colorectal cancer?  · How often do I need screening?  · Which screening tests do I need?  · How do I get my test results?  · What do my results mean?  Where to find more information  Learn more about colorectal cancer screening from:  · The American Cancer Society: cancer.org  · National Cancer [place]: cancer.gov  Summary  · Colorectal cancer screening is a group of tests used to check for colorectal cancer before symptoms develop.  · All adults who are 45-75 years old should have screening. Your health care provider may recommend screening before age 45.  · You may have screening tests starting before age 45, or more often than other people, if you have certain risk factors.  · Screening reduces your risk for colorectal cancer and can help identify cancer at an early stage, when the cancer can be removed or treated more easily.  · Talk with your health care provider to understand your risk for colorectal cancer and to make a screening plan that is right for you.  This information is not intended to replace advice given to you by your health care provider. Make sure you discuss any questions you have with your health care provider.  Document Revised: 04/07/2021 Document Reviewed: 04/07/2021  [employer] Patient Education © 2021 [employer].

## 2021-10-04 NOTE — PROGRESS NOTES
The ABCs of the Annual Wellness Visit  Subsequent Medicare Wellness Visit    Chief Complaint  Subsequent Medicare Wellness Visit    Subjective    History of Present Illness:  Joselito Merida is a 81 y.o. male who presents for a Subsequent Medicare Wellness Visit.    Diabetes  Current symptoms include paresthesia of the feet.  He continues to deny any visual disturbances, polydipsia, polyuria, hypoglycemia or foot ulcerations. Evaluation to date has been: hemoglobin A1C. Home sugars: patient does not check sugars. Current treatments: metformin.  He remains on gabapentin for his paresthesias with some improvement.  His last diabetic eye exam was approximately 2 years ago  Lab Results   Component Value Date    HGBA1C 6.80 (H) 05/03/2021     Lab Results   Component Value Date    MICROALBUR 41.2 05/03/2021     Dyslipidemia  Compliance with treatment has been fair. The patient exercises intermittently. He remains on pravastatin with no apparent side effects  Lab Results   Component Value Date    CHOL 156 03/24/2017    CHLPL 152 05/03/2021    TRIG 195 (H) 05/03/2021    HDL 60 05/03/2021    LDL 60 05/03/2021     Hypertension  Home blood pressure readings: not doing. Associated signs and symptoms: dyspnea.  He continues to deny any chest pain, palpitations, orthopnea, paroxysmal nocturnal dyspnea or peripheral edema. Current antihypertensive medications includes lisinopril. Medication compliance: taking as prescribed.   Lab Results   Component Value Date    GLUCOSE 146 (H) 03/24/2017    BUN 18 05/03/2021    CREATININE 1.21 05/03/2021    EGFRIFNONA 58 (L) 05/03/2021    EGFRIFAFRI 70 05/03/2021    BCR 14.9 05/03/2021    K 5.0 05/03/2021    CO2 27.0 05/03/2021    CALCIUM 9.3 05/03/2021    PROTENTOTREF 6.8 05/03/2021    ALBUMIN 4.50 05/03/2021    LABIL2 2.0 05/03/2021    AST 36 05/03/2021    ALT 42 (H) 05/03/2021     COPD  He complains of persistent shortness of breath with exertion associated with an intermittent dry cough.  He  continues to experience intermittent rhinorrhea and nasal congestion.  There is no history of any other upper respiratory tract symptoms and he continues to deny any chest pain, hemoptysis, fever or chills. He states he is able to walk on level ground with no symptoms but continues to have trouble with inclines, stairs, or lifting. He is following the treatment plan, including medications as directed. He remains on breo or trelegy generally provided through samples.  He has not required a rescue inhaler for some time. He has a smoking history of 40 pack years.    Labs  Most recent vitamin D 31.5 with a B12 of 1134    The following portions of the patient's history were reviewed and   updated as appropriate: allergies, current medications, past family history, past medical history, past social history, past surgical history and problem list.    Compared to one year ago, the patient feels his physical   health is the same.    Compared to one year ago, the patient feels his mental   health is the same.    Recent Hospitalizations:  He was not admitted to the hospital during the last year.     Current Medical Providers:  Patient Care Team:  Markie Medrano MD as PCP - General (Family Medicine)    Outpatient Medications Prior to Visit   Medication Sig Dispense Refill   • Fluticasone-Umeclidin-Vilant 100-62.5-25 MCG/INH aerosol powder  Inhale 1 each Every Morning. 3 each 0   • triamcinolone (KENALOG) 0.1 % cream APPLY TO AFFECTED AREA TWICE DAILY 80 g 5   • aspirin 81 MG tablet Take 1 tablet by mouth daily.     • Cholecalciferol (VITAMIN D) 25 MCG (1000 UT) tablet Take 1 tablet by mouth Daily. 30 tablet 5   • cyanocobalamin (VITAMIN B-12) 1000 MCG tablet Take 1 tablet by mouth Daily. 30 tablet 5   • gabapentin (NEURONTIN) 600 MG tablet Take 1 tablet by mouth Daily. 30 tablet 3   • hydrOXYzine pamoate (VISTARIL) 50 MG capsule TAKE 1 CAPSULE BY MOUTH TWICE A DAY AND TAKE 2 CAPSULES AT BEDTIME 120 capsule 5   •  lisinopril (PRINIVIL,ZESTRIL) 10 MG tablet TAKE 1 TABLET BY MOUTH ONCE DAILY 30 tablet 5   • LORazepam (ATIVAN) 1 MG tablet Take 1 tablet by mouth Every Night. 30 tablet 3   • metFORMIN (GLUCOPHAGE) 500 MG tablet TAKE 1 TABLET BY MOUTH TWICE DAILY WITH MEALS 60 tablet 6   • pravastatin (PRAVACHOL) 40 MG tablet TAKE 1 TABLET BY MOUTH ONCE DAILY 30 tablet 5   • RA ALLERGY RELIEF 10 MG tablet Take 1 tablet by mouth Daily. 30 tablet 5   • sildenafil (REVATIO) 20 MG tablet Take 2-5 po qd prn 60 tablet 5   • tamsulosin (FLOMAX) 0.4 MG capsule 24 hr capsule TAKE 1 CAPSULE BY MOUTH EVERY NIGHT AT BEDTIME. 30 capsule 5   • Zoster Vac Recomb Adjuvanted (Shingrix) 50 MCG/0.5ML reconstituted suspension Inject 0.5 mL into the appropriate muscle as directed by prescriber See Admin Instructions. Repeat in 2-6 months 1 each 1     No facility-administered medications prior to visit.       No opioid medication identified on active medication list. I have reviewed chart for other potential  high risk medication/s and harmful drug interactions in the elderly.          Aspirin is on active medication list. Aspirin use is indicated based on review of current medical condition/s. Pros and cons of this therapy have been discussed today. Benefits of this medication outweigh potential harm.  Patient has been encouraged to continue taking this medication.  .    Patient Active Problem List   Diagnosis   • Essential hypertension   • Mixed hyperlipidemia   • Type 2 diabetes mellitus with peripheral neuropathy (HCC)   • COPD mixed type (HCC)   • Vitamin B 12 deficiency   • Fatty liver, alcoholic   • Chronic insomnia   • Vasculogenic erectile dysfunction   • Benign non-nodular prostatic hyperplasia with lower urinary tract symptoms   • Chronic seasonal allergic rhinitis   • Healthcare maintenance   • Encounter for immunization   • Former smoker   • Seborrheic keratoses   • Asteototic dermatitis   • Vitamin D deficiency   • Skin lesion of right ear  "    Advance Care Planning  Advance Directive is not on file.  ACP discussion was held with the patient during this visit. Patient does not have an advance directive, information provided.    Review of Systems   Constitutional: Positive for fatigue. Negative for appetite change, chills and fever.   HENT: Positive for congestion, rhinorrhea and sneezing. Negative for ear pain, postnasal drip, sore throat and voice change.    Eyes: Negative for visual disturbance.   Respiratory: Positive for cough and shortness of breath. Negative for wheezing.    Cardiovascular: Negative for chest pain, palpitations and leg swelling.   Gastrointestinal: Negative for abdominal pain, blood in stool, constipation, diarrhea, nausea and vomiting.   Genitourinary: Positive for difficulty urinating (decreased stream with intermittent sense of incomplete voiding) and frequency (nocturia). Negative for dysuria, hematuria and urgency.   Musculoskeletal: Negative for arthralgias, back pain, joint swelling, myalgias and neck pain.   Skin: Negative for rash.   Neurological: Positive for numbness. Negative for weakness.   Psychiatric/Behavioral: Positive for sleep disturbance. Negative for dysphoric mood and suicidal ideas. The patient is not nervous/anxious.         Objective    Vitals:    10/04/21 1122   BP: 124/65   Pulse: 56   Resp: 15   Temp: 97.1 °F (36.2 °C)   TempSrc: Temporal   SpO2: 97%   Weight: 73 kg (161 lb)   Height: 160 cm (63\")     BMI Readings from Last 1 Encounters:   10/04/21 28.52 kg/m²   BMI is above normal parameters. Recommendations include: exercise counseling and nutrition counseling    Does the patient have evidence of cognitive impairment? No    Physical Exam  Constitutional:       General: He is not in acute distress.     Appearance: Normal appearance. He is well-developed. He is not ill-appearing or diaphoretic.      Comments: Bright and in fair spirits. No apparent distress. No pallor, jaundice, diaphoresis, or " cyanosis.   HENT:      Head: Atraumatic.      Right Ear: Tympanic membrane, ear canal and external ear normal.      Left Ear: Tympanic membrane, ear canal and external ear normal.   Eyes:      Conjunctiva/sclera: Conjunctivae normal.   Neck:      Thyroid: No thyroid mass or thyromegaly.      Vascular: No carotid bruit or JVD.      Trachea: Trachea normal. No tracheal deviation.   Cardiovascular:      Rate and Rhythm: Normal rate and regular rhythm.      Heart sounds: Normal heart sounds, S1 normal and S2 normal. No murmur heard.   No gallop. No S3 or S4 sounds.    Pulmonary:      Effort: Pulmonary effort is normal.      Breath sounds: Examination of the right-lower field reveals decreased breath sounds. Examination of the left-lower field reveals decreased breath sounds. Decreased breath sounds and wheezing (diffuse - mild) present. No rales.      Comments: Pulmonary hyperinflation  Abdominal:      General: Bowel sounds are normal. There is no distension.      Palpations: Abdomen is soft. There is no hepatomegaly, splenomegaly or mass.      Tenderness: There is no abdominal tenderness.      Hernia: No hernia is present.   Musculoskeletal:      Right lower leg: No edema.      Left lower leg: No edema.   Lymphadenopathy:      Head:      Right side of head: No submental, submandibular, tonsillar, preauricular, posterior auricular or occipital adenopathy.      Left side of head: No submental, submandibular, tonsillar, preauricular, posterior auricular or occipital adenopathy.      Cervical: No cervical adenopathy.      Upper Body:      Right upper body: No supraclavicular adenopathy.      Left upper body: No supraclavicular adenopathy.   Skin:     General: Skin is warm and dry.      Coloration: Skin is not cyanotic, jaundiced or pale.      Findings: Lesion (7-8 millimeter oval shallow ulcer upper pinna right ear -patient states this developed since last here and has been tender) present. No rash.      Nails: There is no  clubbing.   Neurological:      Mental Status: He is alert and oriented to person, place, and time.      Cranial Nerves: No cranial nerve deficit.      Sensory: Sensory deficit (decreased vibration sense both feet) present.      Motor: No tremor.      Coordination: Coordination normal.      Gait: Gait normal.   Psychiatric:         Attention and Perception: Attention normal.         Mood and Affect: Mood normal.         Speech: Speech normal.         Behavior: Behavior normal.         Thought Content: Thought content normal.       HEALTH RISK ASSESSMENT    Smoking Status:  Social History     Tobacco Use   Smoking Status Never Smoker   Smokeless Tobacco Current User   • Types: Chew     Alcohol Consumption:  Social History     Substance and Sexual Activity   Alcohol Use Yes     Fall Risk Screen:    STEADI Fall Risk Assessment was completed, and patient is at LOW risk for falls.Assessment completed on:10/4/2021    Depression Screening:  PHQ-2/PHQ-9 Depression Screening 10/4/2021   Little interest or pleasure in doing things 0   Feeling down, depressed, or hopeless 0   Trouble falling or staying asleep, or sleeping too much 0   Feeling tired or having little energy 0   Poor appetite or overeating 0   Feeling bad about yourself - or that you are a failure or have let yourself or your family down 0   Trouble concentrating on things, such as reading the newspaper or watching television 0   Moving or speaking so slowly that other people could have noticed. Or the opposite - being so fidgety or restless that you have been moving around a lot more than usual 0   Thoughts that you would be better off dead, or of hurting yourself in some way 0   Total Score 0   If you checked off any problems, how difficult have these problems made it for you to do your work, take care of things at home, or get along with other people? Not difficult at all     Health Habits and Functional and Cognitive Screening:  Functional & Cognitive Status  10/4/2021   Do you have difficulty preparing food and eating? No   Do you have difficulty bathing yourself, getting dressed or grooming yourself? No   Do you have difficulty using the toilet? No   Do you have difficulty moving around from place to place? No   Do you have trouble with steps or getting out of a bed or a chair? No   Current Diet Well Balanced Diet   Dental Exam Up to date   Eye Exam Up to date   Exercise (times per week) 7 times per week   Current Exercises Include Walking   Current Exercise Activities Include -   Do you need help using the phone?  No   Are you deaf or do you have serious difficulty hearing?  No   Do you need help with transportation? No   Do you need help shopping? No   Do you need help preparing meals?  No   Do you need help with housework?  No   Do you need help with laundry? No   Do you need help taking your medications? No   Do you need help managing money? No   Do you ever drive or ride in a car without wearing a seat belt? No   Have you felt unusual stress, anger or loneliness in the last month? No   Who do you live with? Other   If you need help, do you have trouble finding someone available to you? No   Have you been bothered in the last four weeks by sexual problems? No   Do you have difficulty concentrating, remembering or making decisions? No     Age-appropriate Screening Schedule:  Refer to the list below for future screening recommendations based on patient's age, sex and/or medical conditions. Orders for these recommended tests are listed in the plan section. The patient has been provided with a written plan.    Health Maintenance   Topic Date Due   • TDAP/TD VACCINES (1 - Tdap) Never done   • ZOSTER VACCINE (1 of 2) Never done   • DXA SCAN  12/31/2021 (Originally 1940)   • HEMOGLOBIN A1C  11/03/2021   • LIPID PANEL  05/03/2022   • URINE MICROALBUMIN  05/03/2022   • DIABETIC EYE EXAM  08/20/2022   • INFLUENZA VACCINE  Completed          Assessment/Plan   CMS  Preventative Services Quick Reference  Risk Factors Identified During Encounter  Alcohol Misuse  Chronic Pain   Fall Risk-High or Moderate  Immunizations Discussed/Encouraged (specific Immunizations; Tdap, Influenza and Shingrix  Inactivity/Sedentary  Obesity/Overweight   The above risks/problems have been discussed with the patient.  Follow up actions/plans if indicated are seen below in the Assessment/Plan Section.  Pertinent information has been shared with the patient in the After Visit Summary.    Diagnoses and all orders for this visit:    1. Chronic seasonal allergic rhinitis   Reminded regarding allergen avoidance.  Continue current medication    2. Essential hypertension   Hypertension: at goal. Evidence of target organ damage: none.  Encouraged to continue to work on diet and exercise plan.   Continue current medication  -     CBC & Differential; Future  -     Comprehensive Metabolic Panel; Future  -     lisinopril (PRINIVIL,ZESTRIL) 10 MG tablet; Take 1 tablet by mouth Daily.  Dispense: 30 tablet; Refill: 5    3. Mixed hyperlipidemia  As above.   Continue current medication.  -     Comprehensive Metabolic Panel; Future  -     Lipid Panel; Future  -     TSH; Future    4. Type 2 diabetes mellitus with peripheral neuropathy (HCC)  Diabetes mellitus Type II, under unknown control.   Encouraged to continue to pursue ADA diet  Encouraged aerobic exercise.  Continue current medication  Scheduled for updated labs  -     Hemoglobin A1c; Future  -     Vitamin B12; Future  -     MicroAlbumin, Urine, Random - Urine, Clean Catch; Future  -     aspirin 81 MG EC tablet; Take 1 tablet by mouth Daily.  Dispense: 30 tablet; Refill: 5  -     gabapentin (NEURONTIN) 600 MG tablet; Take 1 tablet by mouth Daily.  Dispense: 30 tablet; Refill: 3  -     metFORMIN (GLUCOPHAGE) 500 MG tablet; Take 1 tablet by mouth 2 (Two) Times a Day With Meals.  Dispense: 60 tablet; Refill: 5    5. Vitamin B 12 deficiency  Continue supplementation  with monitoring.  -     Vitamin B12; Future  -     cyanocobalamin (VITAMIN B-12) 1000 MCG tablet; Take 1 tablet by mouth Daily.  Dispense: 30 tablet; Refill: 5    6. Vitamin D deficiency  As above.  -     Vitamin D 25 Hydroxy; Future  -     cholecalciferol (Vitamin D) 25 MCG (1000 UT) tablet; Take 1 tablet by mouth Daily.  Dispense: 30 tablet; Refill: 5    7. Fatty liver, alcoholic    8. Erectile dysfunction due to arterial insufficiency  -     sildenafil (REVATIO) 20 MG tablet; Take 2-5 po qd prn  Dispense: 60 tablet; Refill: 5    9. Benign non-nodular prostatic hyperplasia with lower urinary tract symptoms  -     tamsulosin (FLOMAX) 0.4 MG capsule 24 hr capsule; Take 1 capsule by mouth every night at bedtime.  Dispense: 30 capsule; Refill: 5    10. Healthcare maintenance  Flu shot administered.  Recommended a Tdap and Shingrix.  Prescriptions emailed to his pharmacy  -     Fluzone High-Dose 65+yrs  -     Tdap (BOOSTRIX) 5-2.5-18.5 LF-MCG/0.5 injection; Inject 0.5 mL into the appropriate muscle as directed by prescriber 1 (One) Time for 1 dose.  Dispense: 1 each; Refill: 0  -     Zoster Vac Recomb Adjuvanted (Shingrix) 50 MCG/0.5ML reconstituted suspension; Inject 0.5 mL into the appropriate muscle as directed by prescriber See Admin Instructions. Repeat in 2-6 months  Dispense: 1 each; Refill: 1    11. COPD mixed type (HCC)   COPD is stable.  Encouraged to remain off cigarettes  Encouraged to remain as active as symptoms allow for  Continue current medication    12. Chronic insomnia  Continue current medication  Encouraged to report if any worse or if any new symptoms or concerns.  -     hydrOXYzine pamoate (VISTARIL) 50 MG capsule; 1 po bid and 2 po qhs  Dispense: 120 capsule; Refill: 5  -     LORazepam (ATIVAN) 1 MG tablet; Take 1 tablet by mouth Every Night.  Dispense: 30 tablet; Refill: 3    13. Former smoker    14. Skin lesion of right ear  Suspicious for a nonmelanoma skin cancer  Recommended surgical  excision.  Patient like to proceed and referral will be made to general surgery  -     Ambulatory Referral to General Surgery    Follow Up:   Return in about 4 months (around 1/20/2022).     An After Visit Summary and PPPS were made available to the patient.

## 2021-10-16 DIAGNOSIS — E11.42 TYPE 2 DIABETES MELLITUS WITH PERIPHERAL NEUROPATHY (HCC): ICD-10-CM

## 2021-10-16 DIAGNOSIS — F51.04 CHRONIC INSOMNIA: ICD-10-CM

## 2021-10-18 RX ORDER — GABAPENTIN 600 MG/1
TABLET ORAL
Qty: 30 TABLET | Refills: 3 | Status: SHIPPED | OUTPATIENT
Start: 2021-10-18 | End: 2022-01-27 | Stop reason: SDUPTHER

## 2021-10-18 RX ORDER — LORAZEPAM 1 MG/1
1 TABLET ORAL NIGHTLY
Qty: 30 TABLET | Refills: 3 | Status: SHIPPED | OUTPATIENT
Start: 2021-10-18 | End: 2022-01-27 | Stop reason: SDUPTHER

## 2021-11-16 ENCOUNTER — LAB (OUTPATIENT)
Dept: FAMILY MEDICINE CLINIC | Facility: CLINIC | Age: 81
End: 2021-11-16

## 2021-11-16 DIAGNOSIS — E55.9 VITAMIN D DEFICIENCY: ICD-10-CM

## 2021-11-16 DIAGNOSIS — E11.42 TYPE 2 DIABETES MELLITUS WITH PERIPHERAL NEUROPATHY (HCC): ICD-10-CM

## 2021-11-16 DIAGNOSIS — E53.8 VITAMIN B 12 DEFICIENCY: ICD-10-CM

## 2021-11-16 DIAGNOSIS — E78.2 MIXED HYPERLIPIDEMIA: ICD-10-CM

## 2021-11-16 DIAGNOSIS — I10 ESSENTIAL HYPERTENSION: ICD-10-CM

## 2021-11-16 LAB
25(OH)D3+25(OH)D2 SERPL-MCNC: 35.5 NG/ML (ref 30–100)
ALBUMIN SERPL-MCNC: 4.3 G/DL (ref 3.5–5.2)
ALBUMIN/GLOB SERPL: 1.8 G/DL
ALP SERPL-CCNC: 44 U/L (ref 39–117)
ALT SERPL-CCNC: 31 U/L (ref 1–41)
AST SERPL-CCNC: 31 U/L (ref 1–40)
BASOPHILS # BLD AUTO: 0.03 10*3/MM3 (ref 0–0.2)
BASOPHILS NFR BLD AUTO: 0.5 % (ref 0–1.5)
BILIRUB SERPL-MCNC: 0.4 MG/DL (ref 0–1.2)
BUN SERPL-MCNC: 18 MG/DL (ref 8–23)
BUN/CREAT SERPL: 14.5 (ref 7–25)
CALCIUM SERPL-MCNC: 9.6 MG/DL (ref 8.6–10.5)
CHLORIDE SERPL-SCNC: 102 MMOL/L (ref 98–107)
CHOLEST SERPL-MCNC: 150 MG/DL (ref 0–200)
CO2 SERPL-SCNC: 27.2 MMOL/L (ref 22–29)
CREAT SERPL-MCNC: 1.24 MG/DL (ref 0.76–1.27)
EOSINOPHIL # BLD AUTO: 0.43 10*3/MM3 (ref 0–0.4)
EOSINOPHIL NFR BLD AUTO: 7.4 % (ref 0.3–6.2)
ERYTHROCYTE [DISTWIDTH] IN BLOOD BY AUTOMATED COUNT: 12.6 % (ref 12.3–15.4)
GLOBULIN SER CALC-MCNC: 2.4 GM/DL
GLUCOSE SERPL-MCNC: 151 MG/DL (ref 65–99)
HBA1C MFR BLD: 6.6 % (ref 4.8–5.6)
HCT VFR BLD AUTO: 38.9 % (ref 37.5–51)
HDLC SERPL-MCNC: 69 MG/DL (ref 40–60)
HGB BLD-MCNC: 13.2 G/DL (ref 13–17.7)
IMM GRANULOCYTES # BLD AUTO: 0.02 10*3/MM3 (ref 0–0.05)
IMM GRANULOCYTES NFR BLD AUTO: 0.3 % (ref 0–0.5)
LDLC SERPL CALC-MCNC: 51 MG/DL (ref 0–100)
LYMPHOCYTES # BLD AUTO: 2.15 10*3/MM3 (ref 0.7–3.1)
LYMPHOCYTES NFR BLD AUTO: 36.9 % (ref 19.6–45.3)
MCH RBC QN AUTO: 27.7 PG (ref 26.6–33)
MCHC RBC AUTO-ENTMCNC: 33.9 G/DL (ref 31.5–35.7)
MCV RBC AUTO: 81.6 FL (ref 79–97)
MONOCYTES # BLD AUTO: 0.45 10*3/MM3 (ref 0.1–0.9)
MONOCYTES NFR BLD AUTO: 7.7 % (ref 5–12)
NEUTROPHILS # BLD AUTO: 2.74 10*3/MM3 (ref 1.7–7)
NEUTROPHILS NFR BLD AUTO: 47.2 % (ref 42.7–76)
NRBC BLD AUTO-RTO: 0.2 /100 WBC (ref 0–0.2)
PLATELET # BLD AUTO: 171 10*3/MM3 (ref 140–450)
POTASSIUM SERPL-SCNC: 4.9 MMOL/L (ref 3.5–5.2)
PROT SERPL-MCNC: 6.7 G/DL (ref 6–8.5)
RBC # BLD AUTO: 4.77 10*6/MM3 (ref 4.14–5.8)
SODIUM SERPL-SCNC: 139 MMOL/L (ref 136–145)
TRIGL SERPL-MCNC: 188 MG/DL (ref 0–150)
TSH SERPL DL<=0.005 MIU/L-ACNC: 2.88 UIU/ML (ref 0.27–4.2)
VIT B12 SERPL-MCNC: 939 PG/ML (ref 211–946)
VLDLC SERPL CALC-MCNC: 30 MG/DL (ref 5–40)
WBC # BLD AUTO: 5.82 10*3/MM3 (ref 3.4–10.8)

## 2021-11-17 LAB — MICROALBUMIN UR-MCNC: 52.6 UG/ML

## 2022-01-27 ENCOUNTER — OFFICE VISIT (OUTPATIENT)
Dept: FAMILY MEDICINE CLINIC | Facility: CLINIC | Age: 82
End: 2022-01-27

## 2022-01-27 VITALS
HEART RATE: 49 BPM | RESPIRATION RATE: 16 BRPM | HEIGHT: 63 IN | OXYGEN SATURATION: 97 % | WEIGHT: 165 LBS | BODY MASS INDEX: 29.23 KG/M2 | TEMPERATURE: 99.5 F

## 2022-01-27 DIAGNOSIS — J30.9 CHRONIC ALLERGIC RHINITIS: ICD-10-CM

## 2022-01-27 DIAGNOSIS — N40.1 BENIGN NON-NODULAR PROSTATIC HYPERPLASIA WITH LOWER URINARY TRACT SYMPTOMS: ICD-10-CM

## 2022-01-27 DIAGNOSIS — E53.8 VITAMIN B 12 DEFICIENCY: ICD-10-CM

## 2022-01-27 DIAGNOSIS — E55.9 VITAMIN D DEFICIENCY: ICD-10-CM

## 2022-01-27 DIAGNOSIS — I10 ESSENTIAL HYPERTENSION: ICD-10-CM

## 2022-01-27 DIAGNOSIS — J44.9 COPD MIXED TYPE: ICD-10-CM

## 2022-01-27 DIAGNOSIS — M25.551 CHRONIC RIGHT HIP PAIN: ICD-10-CM

## 2022-01-27 DIAGNOSIS — J30.2 CHRONIC SEASONAL ALLERGIC RHINITIS: Primary | ICD-10-CM

## 2022-01-27 DIAGNOSIS — N52.01 ERECTILE DYSFUNCTION DUE TO ARTERIAL INSUFFICIENCY: ICD-10-CM

## 2022-01-27 DIAGNOSIS — Z87.891 FORMER SMOKER: ICD-10-CM

## 2022-01-27 DIAGNOSIS — E78.49 OTHER HYPERLIPIDEMIA: ICD-10-CM

## 2022-01-27 DIAGNOSIS — E78.2 MIXED HYPERLIPIDEMIA: ICD-10-CM

## 2022-01-27 DIAGNOSIS — H61.91 SKIN LESION OF RIGHT EAR: ICD-10-CM

## 2022-01-27 DIAGNOSIS — G89.29 CHRONIC RIGHT HIP PAIN: ICD-10-CM

## 2022-01-27 DIAGNOSIS — E11.42 TYPE 2 DIABETES MELLITUS WITH PERIPHERAL NEUROPATHY: ICD-10-CM

## 2022-01-27 DIAGNOSIS — Z00.00 HEALTHCARE MAINTENANCE: ICD-10-CM

## 2022-01-27 DIAGNOSIS — F51.04 CHRONIC INSOMNIA: ICD-10-CM

## 2022-01-27 DIAGNOSIS — K70.0 FATTY LIVER, ALCOHOLIC: ICD-10-CM

## 2022-01-27 PROCEDURE — 99214 OFFICE O/P EST MOD 30 MIN: CPT | Performed by: GENERAL PRACTICE

## 2022-01-27 RX ORDER — LORAZEPAM 1 MG/1
1 TABLET ORAL NIGHTLY
Qty: 30 TABLET | Refills: 3 | Status: SHIPPED | OUTPATIENT
Start: 2022-01-27 | End: 2022-05-02 | Stop reason: SDUPTHER

## 2022-01-27 RX ORDER — SILDENAFIL CITRATE 20 MG/1
TABLET ORAL
Qty: 60 TABLET | Refills: 5 | Status: SHIPPED | OUTPATIENT
Start: 2022-01-27 | End: 2022-09-03 | Stop reason: SDUPTHER

## 2022-01-27 RX ORDER — MELATONIN
1000 DAILY
Qty: 30 TABLET | Refills: 5 | Status: SHIPPED | OUTPATIENT
Start: 2022-01-27 | End: 2022-09-03 | Stop reason: SDUPTHER

## 2022-01-27 RX ORDER — HYDROXYZINE PAMOATE 50 MG/1
CAPSULE ORAL
Qty: 120 CAPSULE | Refills: 5 | Status: SHIPPED | OUTPATIENT
Start: 2022-01-27 | End: 2022-09-03 | Stop reason: SDUPTHER

## 2022-01-27 RX ORDER — LISINOPRIL 10 MG/1
10 TABLET ORAL DAILY
Qty: 30 TABLET | Refills: 5 | Status: SHIPPED | OUTPATIENT
Start: 2022-01-27 | End: 2022-09-03 | Stop reason: SDUPTHER

## 2022-01-27 RX ORDER — ASPIRIN 81 MG/1
81 TABLET ORAL DAILY
Qty: 30 TABLET | Refills: 5 | Status: SHIPPED | OUTPATIENT
Start: 2022-01-27 | End: 2022-09-03 | Stop reason: SDUPTHER

## 2022-01-27 RX ORDER — GABAPENTIN 600 MG/1
600 TABLET ORAL DAILY
Qty: 30 TABLET | Refills: 3 | Status: SHIPPED | OUTPATIENT
Start: 2022-01-27 | End: 2022-05-02 | Stop reason: SDUPTHER

## 2022-01-27 RX ORDER — TAMSULOSIN HYDROCHLORIDE 0.4 MG/1
1 CAPSULE ORAL
Qty: 30 CAPSULE | Refills: 5 | Status: SHIPPED | OUTPATIENT
Start: 2022-01-27 | End: 2022-02-08

## 2022-01-27 RX ORDER — PRAVASTATIN SODIUM 40 MG
40 TABLET ORAL DAILY
Qty: 30 TABLET | Refills: 5 | Status: SHIPPED | OUTPATIENT
Start: 2022-01-27 | End: 2022-07-29 | Stop reason: SDUPTHER

## 2022-01-27 RX ORDER — CETIRIZINE HYDROCHLORIDE 10 MG/1
10 TABLET ORAL DAILY
Qty: 30 TABLET | Refills: 5 | Status: SHIPPED | OUTPATIENT
Start: 2022-01-27 | End: 2022-09-03 | Stop reason: SDUPTHER

## 2022-01-27 NOTE — PROGRESS NOTES
Subjective   Joselito Merida is a 81 y.o. male.     Chief Complaint  He returns for a scheduled reassessment of multiple medical problems including type 2 diabetes mellitus, hyperlipidemia, essential hypertension, COPD, and right thigh pain    History of Present Illness     Diabetes  Current symptoms include paresthesia of the feet.  He continues to deny any visual disturbances, polydipsia, polyuria, hypoglycemia or foot ulcerations. Evaluation to date has been: hemoglobin A1C. Home sugars: patient does not check sugars. Current treatments: metformin.  He remains on gabapentin for his paresthesias with some improvement.  He underwent an updated diabetic eye exam since last here  Lab Results   Component Value Date    HGBA1C 6.60 (H) 11/16/2021     Lab Results   Component Value Date    MICROALBUR 52.6 11/16/2021     Dyslipidemia  Compliance with treatment has been fair. The patient exercises intermittently. He remains on pravastatin with no apparent side effects  Lab Results   Component Value Date    CHOL 156 03/24/2017    CHLPL 150 11/16/2021    TRIG 188 (H) 11/16/2021    HDL 69 (H) 11/16/2021    LDL 51 11/16/2021     Hypertension  Home blood pressure readings: not doing. Associated signs and symptoms: dyspnea.  He continues to deny any chest pain, palpitations, orthopnea, paroxysmal nocturnal dyspnea or peripheral edema. Current antihypertensive medications includes lisinopril. Medication compliance: taking as prescribed.   Lab Results   Component Value Date    GLUCOSE 151 (H) 11/16/2021    BUN 18 11/16/2021    CREATININE 1.24 11/16/2021    EGFRIFNONA 56 (L) 11/16/2021    EGFRIFAFRI 68 11/16/2021    BCR 14.5 11/16/2021    K 4.9 11/16/2021    CO2 27.2 11/16/2021    CALCIUM 9.6 11/16/2021    PROTENTOTREF 6.7 11/16/2021    ALBUMIN 4.30 11/16/2021    LABIL2 1.8 11/16/2021    AST 31 11/16/2021    ALT 31 11/16/2021     Lab Results   Component Value Date    ALKPHOS 44 11/16/2021    ALKPHOS 38 (L) 03/24/2017     COPD  He has  had an increased cough over the last month or two.  This remains dry and he denies any change in his shortness of breath..  He continues to experience intermittent rhinorrhea and nasal congestion.  There is no history of any other upper respiratory tract symptoms and he continues to deny any chest pain, hemoptysis, fever or chills. He states he is able to walk on level ground with no symptoms but continues to have trouble with inclines, stairs, or lifting. He is following the treatment plan, including medications as directed. He has not been using his maintenance inhaler since last here..  He has not required a rescue inhaler for some time. He has a smoking history of 40 pack years.    Right Thigh Pain  He gives a 9 to 12 month history of intermittent right thigh pain.  He denies any strain or trauma nor any change in his activities.  The pain is described as a sharp ache with prolonged weightbearing.  This does not radiate elsewhere and is been unassociated with any other symptoms.  There is no history of any stiffness or swelling and he denies any changes in his strength or sensation.  He is right-hand dominant    Labs  Most recent vitamin D 35.5 with a B12 of 939  Lab Results   Component Value Date    TSH 2.880 11/16/2021     The following portions of the patient's history were reviewed and updated as appropriate: allergies, current medications, past medical history, past social history and problem list.    Review of Systems   Constitutional: Positive for fatigue. Negative for appetite change, chills, fever and unexpected weight change.   HENT: Positive for congestion, rhinorrhea and sneezing. Negative for ear pain, postnasal drip, sinus pressure and sore throat.    Eyes: Negative for visual disturbance.   Respiratory: Positive for cough and shortness of breath. Negative for wheezing.    Cardiovascular: Negative for chest pain, palpitations and leg swelling.   Gastrointestinal: Negative for abdominal pain, blood  in stool, constipation, diarrhea, nausea and vomiting.   Endocrine: Negative for polydipsia and polyuria.   Genitourinary: Negative for dysuria, frequency, hematuria and urgency.        Nocturia with decreased stream and occasional sense of incomplete voiding   Musculoskeletal: Positive for arthralgias. Negative for back pain and myalgias.   Skin: Negative for rash.   Neurological: Positive for numbness. Negative for weakness and headaches.   Psychiatric/Behavioral: Positive for sleep disturbance. Negative for dysphoric mood. The patient is not nervous/anxious.      Objective   Physical Exam  Constitutional:       General: He is not in acute distress.     Appearance: Normal appearance. He is well-developed. He is not ill-appearing or diaphoretic.      Comments: Bright and in fair spirits.  Normal gait.  No apparent distress. No pallor, jaundice, diaphoresis, or cyanosis.   HENT:      Head: Atraumatic.      Right Ear: Tympanic membrane, ear canal and external ear normal.      Left Ear: Tympanic membrane, ear canal and external ear normal.   Eyes:      Conjunctiva/sclera: Conjunctivae normal.   Neck:      Thyroid: No thyroid mass or thyromegaly.      Vascular: No carotid bruit or JVD.      Trachea: Trachea normal. No tracheal deviation.   Cardiovascular:      Rate and Rhythm: Normal rate and regular rhythm.      Heart sounds: Normal heart sounds, S1 normal and S2 normal. No murmur heard.  No gallop. No S3 or S4 sounds.    Pulmonary:      Effort: Pulmonary effort is normal.      Breath sounds: Examination of the right-lower field reveals decreased breath sounds. Examination of the left-lower field reveals decreased breath sounds. Decreased breath sounds and wheezing (diffuse - mild) present. No rales.      Comments: Pulmonary hyperinflation  Chest:   Breasts:      Right: No supraclavicular adenopathy.      Left: No supraclavicular adenopathy.       Abdominal:      General: Bowel sounds are normal. There is no  distension.   Musculoskeletal:      Lumbar back: Negative right straight leg raise test and negative left straight leg raise test.      Right hip: No tenderness or crepitus. Decreased range of motion (mild limitation of IR with reproduction of pain).      Right upper leg: No swelling or tenderness.      Right knee: No swelling or crepitus. Normal range of motion. No tenderness.      Right lower leg: No edema.      Left lower leg: No edema.   Lymphadenopathy:      Head:      Right side of head: No submental, submandibular, tonsillar, preauricular, posterior auricular or occipital adenopathy.      Left side of head: No submental, submandibular, tonsillar, preauricular, posterior auricular or occipital adenopathy.      Cervical: No cervical adenopathy.      Upper Body:      Right upper body: No supraclavicular adenopathy.      Left upper body: No supraclavicular adenopathy.   Skin:     General: Skin is warm and dry.      Coloration: Skin is not cyanotic, jaundiced or pale.      Findings: Lesion (7-8 millimeter oval shallow ulcer upper pinna right ear) present. No rash.      Nails: There is no clubbing.   Neurological:      Mental Status: He is alert and oriented to person, place, and time.      Cranial Nerves: No cranial nerve deficit.      Sensory: Sensory deficit (decreased vibration sense both feet) present.      Motor: No tremor.      Coordination: Coordination normal.      Gait: Gait normal.   Psychiatric:         Attention and Perception: Attention normal.         Mood and Affect: Mood normal.         Speech: Speech normal.         Behavior: Behavior normal.         Thought Content: Thought content normal.       Assessment/Plan   Problems Addressed this Visit        Allergies and Adverse Reactions    Chronic seasonal allergic rhinitis   Reminded regarding allergen avoidance.  Continue current medication       Cardiac and Vasculature    Essential hypertension   Hypertension: marginal. Evidence of target organ  damage: none.  Encouraged to continue to work on diet and exercise plan.   Continue current medication    Mixed hyperlipidemia  As above.   Continue current medication.       ENT    Skin lesion of right ear  Patient is uninterested in pursuing anything for this at present.  Reminded that if this is a skin cancer it will only enlarge and potentially require more extensive surgery       Endocrine and Metabolic    Type 2 diabetes mellitus with peripheral neuropathy (HCC)  Diabetes mellitus Type II, under excellent control.   Encouraged to continue to pursue ADA diet  Encouraged aerobic exercise.  Continue current medication  Updated labs will be drawn at his return.    Vitamin B 12 deficiency  Corrected  Continue supplementation with monitoring.    Vitamin D deficiency  As above.       Gastrointestinal Abdominal     Fatty liver, alcoholic       Genitourinary and Reproductive     Benign non-nodular prostatic hyperplasia with lower urinary tract symptoms    Vasculogenic erectile dysfunction       Health Encounters    Healthcare maintenance  Patient has already received a flu shot as well as a Moderna COVID-19 l booster this fall  Reminded that he is due for Shingrix and an updated Tdap       Musculoskeletal and Injuries    Chronic right hip pain  Probable underlying osteoarthritis  Advised regarding rest, gentle exercise, ice and heat.  Plain films arranged  Encouraged to report if any worse or if any new symptoms or concerns.    Relevant Orders    XR Hip With or Without Pelvis 2 - 3 View Right       Pulmonary and Pneumonias    COPD mixed type (HCC)  Advised to resume his maintenance inhaler  Chest x-ray arranged  Encouraged to report if any worse or if any new symptoms or concerns.    Relevant Orders    XR Chest 2 View       Sleep    Chronic insomnia       Tobacco    Former smoker      Diagnoses       Codes Comments    Chronic seasonal allergic rhinitis    -  Primary ICD-10-CM: J30.2  ICD-9-CM: 477.8     Essential  hypertension     ICD-10-CM: I10  ICD-9-CM: 401.9     Mixed hyperlipidemia     ICD-10-CM: E78.2  ICD-9-CM: 272.2     Type 2 diabetes mellitus with peripheral neuropathy (HCC)     ICD-10-CM: E11.42  ICD-9-CM: 250.60, 357.2     Vitamin B 12 deficiency     ICD-10-CM: E53.8  ICD-9-CM: 266.2     Vitamin D deficiency     ICD-10-CM: E55.9  ICD-9-CM: 268.9     Fatty liver, alcoholic     ICD-10-CM: K70.0  ICD-9-CM: 571.0     Benign non-nodular prostatic hyperplasia with lower urinary tract symptoms     ICD-10-CM: N40.1  ICD-9-CM: 600.91     Erectile dysfunction due to arterial insufficiency     ICD-10-CM: N52.01  ICD-9-CM: 607.84     Healthcare maintenance     ICD-10-CM: Z00.00  ICD-9-CM: V70.0     COPD mixed type (HCC)     ICD-10-CM: J44.9  ICD-9-CM: 496     Chronic insomnia     ICD-10-CM: F51.04  ICD-9-CM: 780.52     Former smoker     ICD-10-CM: Z87.891  ICD-9-CM: V15.82     Skin lesion of right ear     ICD-10-CM: H61.91  ICD-9-CM: 380.9     Chronic right hip pain     ICD-10-CM: M25.551, G89.29  ICD-9-CM: 719.45, 338.29

## 2022-02-08 DIAGNOSIS — N40.1 BENIGN NON-NODULAR PROSTATIC HYPERPLASIA WITH LOWER URINARY TRACT SYMPTOMS: ICD-10-CM

## 2022-02-08 RX ORDER — TAMSULOSIN HYDROCHLORIDE 0.4 MG/1
1 CAPSULE ORAL
Qty: 30 CAPSULE | Refills: 5 | Status: SHIPPED | OUTPATIENT
Start: 2022-02-08 | End: 2022-08-12 | Stop reason: SDUPTHER

## 2022-05-02 ENCOUNTER — OFFICE VISIT (OUTPATIENT)
Dept: FAMILY MEDICINE CLINIC | Facility: CLINIC | Age: 82
End: 2022-05-02

## 2022-05-02 VITALS
WEIGHT: 162 LBS | DIASTOLIC BLOOD PRESSURE: 64 MMHG | SYSTOLIC BLOOD PRESSURE: 136 MMHG | RESPIRATION RATE: 15 BRPM | HEART RATE: 50 BPM | OXYGEN SATURATION: 98 % | BODY MASS INDEX: 28.7 KG/M2 | TEMPERATURE: 98.6 F | HEIGHT: 63 IN

## 2022-05-02 DIAGNOSIS — G89.29 CHRONIC RIGHT HIP PAIN: ICD-10-CM

## 2022-05-02 DIAGNOSIS — E78.2 MIXED HYPERLIPIDEMIA: ICD-10-CM

## 2022-05-02 DIAGNOSIS — N40.1 BENIGN NON-NODULAR PROSTATIC HYPERPLASIA WITH LOWER URINARY TRACT SYMPTOMS: ICD-10-CM

## 2022-05-02 DIAGNOSIS — E53.8 VITAMIN B 12 DEFICIENCY: ICD-10-CM

## 2022-05-02 DIAGNOSIS — K70.0 FATTY LIVER, ALCOHOLIC: ICD-10-CM

## 2022-05-02 DIAGNOSIS — F51.04 CHRONIC INSOMNIA: ICD-10-CM

## 2022-05-02 DIAGNOSIS — M25.551 CHRONIC RIGHT HIP PAIN: ICD-10-CM

## 2022-05-02 DIAGNOSIS — Z87.891 FORMER SMOKER: ICD-10-CM

## 2022-05-02 DIAGNOSIS — L82.1 SEBORRHEIC KERATOSES: ICD-10-CM

## 2022-05-02 DIAGNOSIS — N52.01 ERECTILE DYSFUNCTION DUE TO ARTERIAL INSUFFICIENCY: ICD-10-CM

## 2022-05-02 DIAGNOSIS — J44.9 COPD MIXED TYPE: ICD-10-CM

## 2022-05-02 DIAGNOSIS — I10 ESSENTIAL HYPERTENSION: ICD-10-CM

## 2022-05-02 DIAGNOSIS — E11.42 TYPE 2 DIABETES MELLITUS WITH PERIPHERAL NEUROPATHY: ICD-10-CM

## 2022-05-02 DIAGNOSIS — H61.91 SKIN LESION OF RIGHT EAR: ICD-10-CM

## 2022-05-02 DIAGNOSIS — Z00.00 HEALTHCARE MAINTENANCE: ICD-10-CM

## 2022-05-02 DIAGNOSIS — J30.2 CHRONIC SEASONAL ALLERGIC RHINITIS: Primary | ICD-10-CM

## 2022-05-02 DIAGNOSIS — E55.9 VITAMIN D DEFICIENCY: ICD-10-CM

## 2022-05-02 PROCEDURE — 80061 LIPID PANEL: CPT | Performed by: GENERAL PRACTICE

## 2022-05-02 PROCEDURE — 85025 COMPLETE CBC W/AUTO DIFF WBC: CPT | Performed by: GENERAL PRACTICE

## 2022-05-02 PROCEDURE — 80053 COMPREHEN METABOLIC PANEL: CPT | Performed by: GENERAL PRACTICE

## 2022-05-02 PROCEDURE — 82043 UR ALBUMIN QUANTITATIVE: CPT | Performed by: GENERAL PRACTICE

## 2022-05-02 PROCEDURE — 99214 OFFICE O/P EST MOD 30 MIN: CPT | Performed by: GENERAL PRACTICE

## 2022-05-02 PROCEDURE — 83036 HEMOGLOBIN GLYCOSYLATED A1C: CPT | Performed by: GENERAL PRACTICE

## 2022-05-02 RX ORDER — MOMETASONE FUROATE 50 UG/1
SPRAY, METERED NASAL
Qty: 1 EACH | Refills: 5 | Status: SHIPPED | OUTPATIENT
Start: 2022-05-02 | End: 2022-09-03

## 2022-05-02 RX ORDER — GABAPENTIN 600 MG/1
600 TABLET ORAL DAILY
Qty: 30 TABLET | Refills: 3 | Status: SHIPPED | OUTPATIENT
Start: 2022-05-02 | End: 2022-08-12 | Stop reason: SDUPTHER

## 2022-05-02 RX ORDER — LORAZEPAM 1 MG/1
1 TABLET ORAL NIGHTLY
Qty: 30 TABLET | Refills: 3 | Status: SHIPPED | OUTPATIENT
Start: 2022-05-02 | End: 2022-08-12 | Stop reason: SDUPTHER

## 2022-05-02 RX ORDER — MONTELUKAST SODIUM 10 MG/1
10 TABLET ORAL DAILY
Qty: 30 TABLET | Refills: 5 | Status: SHIPPED | OUTPATIENT
Start: 2022-05-02 | End: 2022-09-03 | Stop reason: SDUPTHER

## 2022-05-02 NOTE — PROGRESS NOTES
Subjective   Joselito Merida is a 81 y.o. male.     Chief Complaint  He returns for a scheduled reassessment of multiple medical problems including type 2 diabetes mellitus, hyperlipidemia, essential hypertension, chronic allergic rhinitis, COPD, and right hip pain    History of Present Illness     Diabetes  He admits to paresthesias of the feet.  He continues to deny any visual disturbances, polydipsia, polyuria, hypoglycemia or foot ulcerations. Evaluation to date has been: hemoglobin A1C. Home sugars: patient does not check sugars. Current treatments: metformin.  He remains on gabapentin for his paresthesias with some improvement.  He has had no recent labs    Dyslipidemia  Compliance with treatment has been fair. The patient exercises intermittently. He remains on pravastatin with no apparent side effects    Hypertension  Home blood pressure readings: not doing. Associated signs and symptoms: dyspnea.  He continues to deny any chest pain, palpitations, orthopnea, paroxysmal nocturnal dyspnea or peripheral edema. Current antihypertensive medications includes lisinopril.     Chronic Allergic Rhinitis  While year-round he has experienced increased rhinorrhea, nasal congestion, and postnasal drip through spring.  He denies any other upper respiratory tract symptoms and there is no history of any fever or chills.  He has tried several of the second-generation antihistamines with little impact on his symptoms.    COPD  He feels that his cough and shortness of breath are at baseline. He continues to deny any chest pain, hemoptysis, fever or chills. He states he is able to walk on level ground with no symptoms but continues to have trouble with inclines, stairs, or lifting. He is following the treatment plan, including medications as directed. He has not been using his maintenance inhaler since last here. He has not required a rescue inhaler for some time. He has a smoking history of 40 pack years.    Right Hip Pain  He  gives an over 1 year history of intermittent right thigh pain.  He denies any strain or trauma nor any change in his activities.  The pain is described as a sharp ache with prolonged weightbearing.  This does not radiate elsewhere and is been unassociated with any other symptoms.  There is no history of any stiffness or swelling and he denies any changes in his strength or sensation.  He denies any limitation of his activities or sleep at present.  He is right-hand dominant. Plain films of the right hip performed on 1/27/2022 were unremarkable    The following portions of the patient's history were reviewed and updated as appropriate: allergies, current medications, past medical history, past social history and problem list.    Review of Systems   Constitutional: Positive for fatigue. Negative for appetite change, chills, fever and unexpected weight change.   HENT: Positive for congestion, postnasal drip, rhinorrhea and sneezing. Negative for ear pain, sinus pressure and sore throat.    Eyes: Negative for visual disturbance.   Respiratory: Positive for cough and shortness of breath. Negative for wheezing.    Cardiovascular: Negative for chest pain, palpitations and leg swelling.   Gastrointestinal: Negative for abdominal pain, blood in stool, constipation, diarrhea, nausea and vomiting.   Endocrine: Negative for polydipsia and polyuria.   Genitourinary: Negative for dysuria, frequency, hematuria and urgency.        Nocturia with decreased stream and occasional sense of incomplete voiding   Musculoskeletal: Positive for arthralgias. Negative for back pain and myalgias.   Skin: Negative for rash.   Neurological: Positive for numbness. Negative for weakness and headaches.   Psychiatric/Behavioral: Positive for sleep disturbance. Negative for dysphoric mood. The patient is not nervous/anxious.      Objective   Physical Exam  Constitutional:       General: He is not in acute distress.     Appearance: Normal appearance.  He is well-developed. He is not ill-appearing or diaphoretic.      Comments: Bright and in fair spirits.  Normal gait.  No apparent distress. No pallor, jaundice, diaphoresis, or cyanosis.   HENT:      Head: Atraumatic.      Right Ear: Tympanic membrane, ear canal and external ear normal.      Left Ear: Tympanic membrane, ear canal and external ear normal.   Eyes:      Conjunctiva/sclera: Conjunctivae normal.   Neck:      Thyroid: No thyroid mass or thyromegaly.      Vascular: No carotid bruit or JVD.      Trachea: Trachea normal. No tracheal deviation.   Cardiovascular:      Rate and Rhythm: Normal rate and regular rhythm.      Heart sounds: Normal heart sounds, S1 normal and S2 normal. No murmur heard.    No gallop. No S3 or S4 sounds.   Pulmonary:      Effort: Pulmonary effort is normal.      Breath sounds: Examination of the right-lower field reveals decreased breath sounds. Examination of the left-lower field reveals decreased breath sounds. Decreased breath sounds and wheezing (diffuse - mild) present. No rales.      Comments: Pulmonary hyperinflation  Chest:   Breasts:      Right: No supraclavicular adenopathy.      Left: No supraclavicular adenopathy.       Abdominal:      General: Bowel sounds are normal. There is no distension.   Musculoskeletal:      Lumbar back: Negative right straight leg raise test and negative left straight leg raise test.      Right lower leg: No edema.      Left lower leg: No edema.   Lymphadenopathy:      Head:      Right side of head: No submental, submandibular, tonsillar, preauricular, posterior auricular or occipital adenopathy.      Left side of head: No submental, submandibular, tonsillar, preauricular, posterior auricular or occipital adenopathy.      Cervical: No cervical adenopathy.      Upper Body:      Right upper body: No supraclavicular adenopathy.      Left upper body: No supraclavicular adenopathy.   Skin:     General: Skin is warm and dry.      Coloration: Skin is  not cyanotic, jaundiced or pale.      Findings: Lesion (7-8 millimeter skin colored papule with central punctum upper helix right ear) present. No rash.      Nails: There is no clubbing.   Neurological:      Mental Status: He is alert and oriented to person, place, and time.      Cranial Nerves: No cranial nerve deficit.      Sensory: Sensory deficit (decreased vibration sense both feet) present.      Motor: No tremor.      Coordination: Coordination normal.      Gait: Gait normal.   Psychiatric:         Attention and Perception: Attention normal.         Mood and Affect: Mood normal.         Speech: Speech normal.         Behavior: Behavior normal.         Thought Content: Thought content normal.       Assessment/Plan   Problems Addressed this Visit        Allergies and Adverse Reactions    Chronic seasonal allergic rhinitis   Reminded regarding allergen avoidance.  Reviewed options and agreed on a trial of nasal mometasone along with oral montelukast  Encouraged to report if any worse, any new symptoms, or if no neck several weeks    Relevant Medications    montelukast (SINGULAIR) 10 MG tablet    mometasone (NASONEX) 50 MCG/ACT nasal spray       Cardiac and Vasculature    Essential hypertension   Hypertension: at goal. Evidence of target organ damage: none.  Encouraged to continue to work on diet and exercise plan.   Continue current medication    Relevant Orders    CBC & Differential    Comprehensive Metabolic Panel    Mixed hyperlipidemia  As above.   Continue current medication.    Relevant Orders    Comprehensive Metabolic Panel    Lipid Panel       ENT    Skin lesion of right ear  Appearance more consistent with a benign process at present  Patient remains uninterested in excisional biopsy either way but agrees to report if any worse or if any new symptoms or concerns       Endocrine and Metabolic    Type 2 diabetes mellitus with peripheral neuropathy (HCC)  Diabetes mellitus Type II, under unknown control.    Encouraged to continue to pursue ADA diet  Encouraged aerobic exercise.  Continue current medication  Updated labs drawn.    Relevant Medications    gabapentin (NEURONTIN) 600 MG tablet    Other Relevant Orders    Hemoglobin A1c    MicroAlbumin, Urine, Random - Urine, Random Void    Vitamin B 12 deficiency    Relevant Orders    CBC & Differential    Vitamin D deficiency       Gastrointestinal Abdominal     Fatty liver, alcoholic       Genitourinary and Reproductive     Benign non-nodular prostatic hyperplasia with lower urinary tract symptoms    Vasculogenic erectile dysfunction       Health Encounters    Healthcare maintenance  Recommended a Moderna COVID-19 booster  Reminded that he is due for Tdap and Shingrix       Musculoskeletal and Injuries    Chronic right hip pain  Reminded regarding symptomatic treatment.   Encouraged to report if any worse or if any new symptoms or concerns.       Pulmonary and Pneumonias    COPD mixed type (HCC)   COPD is stable.  Encouraged to remain as active as symptoms allow for  Continue current medication    Relevant Medications    montelukast (SINGULAIR) 10 MG tablet    mometasone (NASONEX) 50 MCG/ACT nasal spray       Skin    Seborrheic keratoses       Sleep    Chronic insomnia  Continue current medication  Encouraged to report if any worse or if any new symptoms or concerns.    Relevant Medications    LORazepam (ATIVAN) 1 MG tablet       Tobacco    Former smoker      Diagnoses       Codes Comments    Chronic seasonal allergic rhinitis    -  Primary ICD-10-CM: J30.2  ICD-9-CM: 477.8     Essential hypertension     ICD-10-CM: I10  ICD-9-CM: 401.9     Mixed hyperlipidemia     ICD-10-CM: E78.2  ICD-9-CM: 272.2     Skin lesion of right ear     ICD-10-CM: H61.91  ICD-9-CM: 380.9     Type 2 diabetes mellitus with peripheral neuropathy (HCC)     ICD-10-CM: E11.42  ICD-9-CM: 250.60, 357.2     Vitamin B 12 deficiency     ICD-10-CM: E53.8  ICD-9-CM: 266.2     Vitamin D deficiency      ICD-10-CM: E55.9  ICD-9-CM: 268.9     Fatty liver, alcoholic     ICD-10-CM: K70.0  ICD-9-CM: 571.0     Benign non-nodular prostatic hyperplasia with lower urinary tract symptoms     ICD-10-CM: N40.1  ICD-9-CM: 600.91     Erectile dysfunction due to arterial insufficiency     ICD-10-CM: N52.01  ICD-9-CM: 607.84     Healthcare maintenance     ICD-10-CM: Z00.00  ICD-9-CM: V70.0     COPD mixed type (HCC)     ICD-10-CM: J44.9  ICD-9-CM: 496     Seborrheic keratoses     ICD-10-CM: L82.1  ICD-9-CM: 702.19     Chronic insomnia     ICD-10-CM: F51.04  ICD-9-CM: 780.52     Former smoker     ICD-10-CM: Z87.891  ICD-9-CM: V15.82     Chronic right hip pain     ICD-10-CM: M25.551, G89.29  ICD-9-CM: 719.45, 338.29

## 2022-05-03 LAB
ALBUMIN SERPL-MCNC: 4.2 G/DL (ref 3.5–5.2)
ALBUMIN UR-MCNC: 25.2 MG/DL
ALBUMIN/GLOB SERPL: 1.7 G/DL
ALP SERPL-CCNC: 44 U/L (ref 39–117)
ALT SERPL W P-5'-P-CCNC: 46 U/L (ref 1–41)
ANION GAP SERPL CALCULATED.3IONS-SCNC: 14 MMOL/L (ref 5–15)
AST SERPL-CCNC: 47 U/L (ref 1–40)
BASOPHILS # BLD AUTO: 0.03 10*3/MM3 (ref 0–0.2)
BASOPHILS NFR BLD AUTO: 0.5 % (ref 0–1.5)
BILIRUB SERPL-MCNC: 0.3 MG/DL (ref 0–1.2)
BUN SERPL-MCNC: 17 MG/DL (ref 8–23)
BUN/CREAT SERPL: 16.5 (ref 7–25)
CALCIUM SPEC-SCNC: 9.3 MG/DL (ref 8.6–10.5)
CHLORIDE SERPL-SCNC: 104 MMOL/L (ref 98–107)
CHOLEST SERPL-MCNC: 149 MG/DL (ref 0–200)
CO2 SERPL-SCNC: 24 MMOL/L (ref 22–29)
CREAT SERPL-MCNC: 1.03 MG/DL (ref 0.76–1.27)
DEPRECATED RDW RBC AUTO: 48 FL (ref 37–54)
EGFRCR SERPLBLD CKD-EPI 2021: 73.9 ML/MIN/1.73
EOSINOPHIL # BLD AUTO: 0.21 10*3/MM3 (ref 0–0.4)
EOSINOPHIL NFR BLD AUTO: 3.5 % (ref 0.3–6.2)
ERYTHROCYTE [DISTWIDTH] IN BLOOD BY AUTOMATED COUNT: 15.7 % (ref 12.3–15.4)
GLOBULIN UR ELPH-MCNC: 2.5 GM/DL
GLUCOSE SERPL-MCNC: 87 MG/DL (ref 65–99)
HBA1C MFR BLD: 6.4 % (ref 4.8–5.6)
HCT VFR BLD AUTO: 39.9 % (ref 37.5–51)
HDLC SERPL-MCNC: 69 MG/DL (ref 40–60)
HGB BLD-MCNC: 13.8 G/DL (ref 13–17.7)
IMM GRANULOCYTES # BLD AUTO: 0.02 10*3/MM3 (ref 0–0.05)
IMM GRANULOCYTES NFR BLD AUTO: 0.3 % (ref 0–0.5)
LDLC SERPL CALC-MCNC: 63 MG/DL (ref 0–100)
LDLC/HDLC SERPL: 0.89 {RATIO}
LYMPHOCYTES # BLD AUTO: 1.54 10*3/MM3 (ref 0.7–3.1)
LYMPHOCYTES NFR BLD AUTO: 25.6 % (ref 19.6–45.3)
MCH RBC QN AUTO: 29.4 PG (ref 26.6–33)
MCHC RBC AUTO-ENTMCNC: 34.6 G/DL (ref 31.5–35.7)
MCV RBC AUTO: 85.1 FL (ref 79–97)
MONOCYTES # BLD AUTO: 0.45 10*3/MM3 (ref 0.1–0.9)
MONOCYTES NFR BLD AUTO: 7.5 % (ref 5–12)
NEUTROPHILS NFR BLD AUTO: 3.76 10*3/MM3 (ref 1.7–7)
NEUTROPHILS NFR BLD AUTO: 62.6 % (ref 42.7–76)
NRBC BLD AUTO-RTO: 0 /100 WBC (ref 0–0.2)
PLATELET # BLD AUTO: 199 10*3/MM3 (ref 140–450)
PMV BLD AUTO: 10.4 FL (ref 6–12)
POTASSIUM SERPL-SCNC: 5.6 MMOL/L (ref 3.5–5.2)
PROT SERPL-MCNC: 6.7 G/DL (ref 6–8.5)
RBC # BLD AUTO: 4.69 10*6/MM3 (ref 4.14–5.8)
SODIUM SERPL-SCNC: 142 MMOL/L (ref 136–145)
TRIGL SERPL-MCNC: 93 MG/DL (ref 0–150)
VLDLC SERPL-MCNC: 17 MG/DL (ref 5–40)
WBC NRBC COR # BLD: 6.01 10*3/MM3 (ref 3.4–10.8)

## 2022-07-29 DIAGNOSIS — E78.49 OTHER HYPERLIPIDEMIA: ICD-10-CM

## 2022-07-29 RX ORDER — PRAVASTATIN SODIUM 40 MG
40 TABLET ORAL DAILY
Qty: 30 TABLET | Refills: 5 | Status: SHIPPED | OUTPATIENT
Start: 2022-07-29 | End: 2022-09-03 | Stop reason: SDUPTHER

## 2022-07-29 NOTE — TELEPHONE ENCOUNTER
Incoming Refill Request      Medication requested (name and dose): pravastatin (PRAVACHOL) 40 MG tablet    Pharmacy where request should be sent: [facility]    Best phone number:  [phone]    Best call back number: Levi Camacho  07/29/22, 12:22 EDT

## 2022-08-01 ENCOUNTER — OFFICE VISIT (OUTPATIENT)
Dept: FAMILY MEDICINE CLINIC | Facility: CLINIC | Age: 82
End: 2022-08-01

## 2022-08-01 ENCOUNTER — TELEPHONE (OUTPATIENT)
Dept: FAMILY MEDICINE CLINIC | Facility: CLINIC | Age: 82
End: 2022-08-01

## 2022-08-01 VITALS
OXYGEN SATURATION: 91 % | DIASTOLIC BLOOD PRESSURE: 70 MMHG | HEART RATE: 65 BPM | SYSTOLIC BLOOD PRESSURE: 150 MMHG | BODY MASS INDEX: 28.88 KG/M2 | WEIGHT: 163 LBS | TEMPERATURE: 97.3 F | HEIGHT: 63 IN

## 2022-08-01 DIAGNOSIS — J30.2 CHRONIC SEASONAL ALLERGIC RHINITIS: Chronic | ICD-10-CM

## 2022-08-01 DIAGNOSIS — J44.9 COPD MIXED TYPE: Chronic | ICD-10-CM

## 2022-08-01 DIAGNOSIS — I10 ESSENTIAL HYPERTENSION: Chronic | ICD-10-CM

## 2022-08-01 DIAGNOSIS — R79.89 ELEVATED LFTS: ICD-10-CM

## 2022-08-01 DIAGNOSIS — E11.9 TYPE 2 DIABETES MELLITUS WITHOUT COMPLICATION, WITHOUT LONG-TERM CURRENT USE OF INSULIN: ICD-10-CM

## 2022-08-01 DIAGNOSIS — E87.5 HYPERKALEMIA: ICD-10-CM

## 2022-08-01 DIAGNOSIS — L20.9 ATOPIC DERMATITIS, MILD: Primary | ICD-10-CM

## 2022-08-01 PROCEDURE — 99214 OFFICE O/P EST MOD 30 MIN: CPT | Performed by: PHYSICIAN ASSISTANT

## 2022-08-01 RX ORDER — TRIAMCINOLONE ACETONIDE 1 MG/G
1 CREAM TOPICAL 2 TIMES DAILY
Qty: 80 G | Refills: 0 | Status: SHIPPED | OUTPATIENT
Start: 2022-08-01 | End: 2022-08-15

## 2022-08-01 NOTE — PATIENT INSTRUCTIONS
BMI for Adults  Body mass index (BMI) is a number that is calculated from a person's weight and height. In most adults, the number is used to find how much of an adult's weight is made up of fat. BMI is not as accurate as a direct measure of body fat.  HOW IS BMI CALCULATED?  BMI is calculated by dividing weight in kilograms by height in meters squared. It can also be calculated by dividing weight in pounds by height in inches squared, then multiplying the resulting number by 703. Charts are available to help you find your BMI quickly and easily without doing this calculation.   HOW IS BMI INTERPRETED?  Health care professionals use BMI charts to identify whether an adult is underweight, at a normal weight, or overweight based on the following guidelines:  Underweight: BMI less than 18.5.  Normal weight: BMI between 18.5 and 24.9.  Overweight: BMI between 25 and 29.9.  Obese: BMI of 30 and above.  BMI is usually interpreted the same for males and females.  Weight includes both fat and muscle, so someone with a muscular build, such as an athlete, may have a BMI that is higher than 24.9. In cases like these, BMI may not accurately depict body fat. To determine if excess body fat is the cause of a BMI of 25 or higher, further assessments may need to be done by a health care provider.  WHY IS BMI A USEFUL TOOL?  BMI is used to identify a possible weight problem that may be related to a medical problem or may increase the risk for medical problems. BMI can also be used to promote changes to reach a healthy weight.     This information is not intended to replace advice given to you by your health care provider. Make sure you discuss any questions you have with your health care provider.     Document Released: 08/29/2005 Document Revised: 01/08/2016 Document Reviewed: 05/15/2015  [employer] Interactive Patient Education ©2017 [employer]       Calorie Counting for Weight Loss  Calories are energy you get from the things  you eat and drink. Your body uses this energy to keep you going throughout the day. The number of calories you eat affects your weight. When you eat more calories than your body needs, your body stores the extra calories as fat. When you eat fewer calories than your body needs, your body burns fat to get the energy it needs.  Calorie counting means keeping track of how many calories you eat and drink each day. If you make sure to eat fewer calories than your body needs, you should lose weight. In order for calorie counting to work, you will need to eat the number of calories that are right for you in a day to lose a healthy amount of weight per week. A healthy amount of weight to lose per week is usually 1-2 lb (0.5-0.9 kg). A dietitian can determine how many calories you need in a day and give you suggestions on how to reach your calorie goal.   WHAT IS MY MY PLAN?  My goal is to have __________ calories per day.   If I have this many calories per day, I should lose around __________ pounds per week.  WHAT DO I NEED TO KNOW ABOUT CALORIE COUNTING?  In order to meet your daily calorie goal, you will need to:  Find out how many calories are in each food you would like to eat. Try to do this before you eat.  Decide how much of the food you can eat.  Write down what you ate and how many calories it had. Doing this is called keeping a food log.  WHERE DO I FIND CALORIE INFORMATION?  The number of calories in a food can be found on a Nutrition Facts label. Note that all the information on a label is based on a specific serving of the food. If a food does not have a Nutrition Facts label, try to look up the calories online or ask your dietitian for help.  HOW DO I DECIDE HOW MUCH TO EAT?  To decide how much of the food you can eat, you will need to consider both the number of calories in one serving and the size of one serving. This information can be found on the Nutrition Facts label. If a food does not have a Nutrition  Facts label, look up the information online or ask your dietitian for help.  Remember that calories are listed per serving. If you choose to have more than one serving of a food, you will have to multiply the calories per serving by the amount of servings you plan to eat. For example, the label on a package of bread might say that a serving size is 1 slice and that there are 90 calories in a serving. If you eat 1 slice, you will have eaten 90 calories. If you eat 2 slices, you will have eaten 180 calories.  HOW DO I KEEP A FOOD LOG?  After each meal, record the following information in your food log:  What you ate.  How much of it you ate.  How many calories it had.  Then, add up your calories.  Keep your food log near you, such as in a small notebook in your pocket. Another option is to use a mobile harpreet or website. Some programs will calculate calories for you and show you how many calories you have left each time you add an item to the log.  WHAT ARE SOME CALORIE COUNTING TIPS?  Use your calories on foods and drinks that will fill you up and not leave you hungry. Some examples of this include foods like nuts and nut butters, vegetables, lean proteins, and high-fiber foods (more than 5 g fiber per serving).  Eat nutritious foods and avoid empty calories. Empty calories are calories you get from foods or beverages that do not have many nutrients, such as candy and soda. It is better to have a nutritious high-calorie food (such as an avocado) than a food with few nutrients (such as a bag of chips).  Know how many calories are in the foods you eat most often. This way, you do not have to look up how many calories they have each time you eat them.  Look out for foods that may seem like low-calorie foods but are really high-calorie foods, such as baked goods, soda, and fat-free candy.  Pay attention to calories in drinks. Drinks such as sodas, specialty coffee drinks, alcohol, and juices have a lot of calories yet do  not fill you up. Choose low-calorie drinks like water and diet drinks.  Focus your calorie counting efforts on higher calorie items. Logging the calories in a garden salad that contains only vegetables is less important than calculating the calories in a milk shake.  Find a way of tracking calories that works for you. Get creative. Most people who are successful find ways to keep track of how much they eat in a day, even if they do not count every calorie.  WHAT ARE SOME PORTION CONTROL TIPS?  Know how many calories are in a serving. This will help you know how many servings of a certain food you can have.  Use a measuring cup to measure serving sizes. This is helpful when you start out. With time, you will be able to estimate serving sizes for some foods.  Take some time to put servings of different foods on your favorite plates, bowls, and cups so you know what a serving looks like.  Try not to eat straight from a bag or box. Doing this can lead to overeating. Put the amount you would like to eat in a cup or on a plate to make sure you are eating the right portion.  Use smaller plates, glasses, and bowls to prevent overeating. This is a quick and easy way to practice portion control. If your plate is smaller, less food can fit on it.  Try not to multitask while eating, such as watching TV or using your computer. If it is time to eat, sit down at a table and enjoy your food. Doing this will help you to start recognizing when you are full. It will also make you more aware of what and how much you are eating.  HOW CAN I CALORIE COUNT WHEN EATING OUT?  Ask for smaller portion sizes or child-sized portions.  Consider sharing an entree and sides instead of getting your own entree.  If you get your own entree, eat only half. Ask for a box at the beginning of your meal and put the rest of your entree in it so you are not tempted to eat it.  Look for the calories on the menu. If calories are listed, choose the lower  "calorie options.  Choose dishes that include vegetables, fruits, whole grains, low-fat dairy products, and lean protein. Focusing on smart food choices from each of the 5 food groups can help you stay on track at restaurants.  Choose items that are boiled, broiled, grilled, or steamed.  Choose water, milk, unsweetened iced tea, or other drinks without added sugars. If you want an alcoholic beverage, choose a lower calorie option. For example, a regular yissel can have up to 700 calories and a glass of wine has around 150.  Stay away from items that are buttered, battered, fried, or served with cream sauce. Items labeled \"crispy\" are usually fried, unless stated otherwise.  Ask for dressings, sauces, and syrups on the side. These are usually very high in calories, so do not eat much of them.  Watch out for salads. Many people think salads are a healthy option, but this is often not the case. Many salads come with willams, fried chicken, lots of cheese, fried chips, and dressing. All of these items have a lot of calories. If you want a salad, choose a garden salad and ask for grilled meats or steak. Ask for the dressing on the side, or ask for olive oil and vinegar or lemon to use as dressing.  Estimate how many servings of a food you are given. For example, a serving of cooked rice is ½ cup or about the size of half a tennis ball or one cupcake wrapper. Knowing serving sizes will help you be aware of how much food you are eating at restaurants. The list below tells you how big or small some common portion sizes are based on everyday objects.  1 oz--4 stacked dice.  3 oz--1 deck of cards.  1 tsp--1 dice.  1 Tbsp--½ a Ping-Pong ball.  2 Tbsp--1 Ping-Pong ball.  ½ cup--1 tennis ball or 1 cupcake wrapper.  1 cup--1 baseball.     This information is not intended to replace advice given to you by your health care provider. Make sure you discuss any questions you have with your health care provider.     Document Released: " 12/18/2006 Document Revised: 01/08/2016 Document Reviewed: 10/23/2014  [employer] Interactive Patient Education ©2017 [employer]

## 2022-08-01 NOTE — PROGRESS NOTES
Subjective        Chief Complaint  numbness in right finger    Subjective      History of Present Illness  Joselito Merida is a 81 y.o. male who presents today to [facility] for acute complaints. . Past medical history is significant for chronic seasonal allergies, HTN, HLD, type II DM, COPD, and a past history of tobacco abuse.  He presents the office today with complaints of itching and rash of the right index finger.    Right finger rash:   Patient denies any actual numbness or tingling of any portion of the right hand.  He reports completely intact sensation.  He does report some scaling, itching, and rash which has been present for 3 months.  He denies any use of any new soaps, lotions, or any new recent medications.  No previous injury to the finger.  Does report some intermittent swelling.  Denies any pain in the joints of the finger.  He does report being right-handed.  No rashes or itching to any other portion of the body reported.    Chronic seasonal allergies:  Patient reports recent rhinorrhea.  He has been taking Vistaril, Singulair, cetirizine and using his Trelegy inhaler.  He denies use of his home Nasonex, but does report that he has it available if needed.    HTN:  Patient's home blood pressure monitor is broken, therefore he does not monitor routinely.  BP elevated initially at 180/80.  He takes lisinopril 10 mg daily at home and reports taking it approximately 1 hour prior to presentation to the office.  Repeat BP after sitting in the room was 150/70.  He denies any headache, flushing, vision changes, chest pains, or numbness and tingling sensations.  He reports compliance with his lisinopril.    COPD:  Denies any recent cough, shortness of breath.  He does have chronic allergies for which he takes multiple medications.  He is requesting a refill on his Trelegy inhaler if samples are available.    Type II DM, non-insulin-dependent:  Managed on metformin 500 mg  twice daily.  Recent H A1c 6.4% 5/2/2022, showing improvement.      Current Outpatient Medications:   •  aspirin 81 MG EC tablet, Take 1 tablet by mouth Daily., Disp: 30 tablet, Rfl: 5  •  cetirizine (RA Allergy Relief) 10 MG tablet, Take 1 tablet by mouth Daily., Disp: 30 tablet, Rfl: 5  •  cholecalciferol (Vitamin D) 25 MCG (1000 UT) tablet, Take 1 tablet by mouth Daily., Disp: 30 tablet, Rfl: 5  •  cyanocobalamin (VITAMIN B-12) 1000 MCG tablet, Take 1 tablet by mouth Daily., Disp: 30 tablet, Rfl: 5  •  Fluticasone-Umeclidin-Vilant (TRELEGY) 100-62.5-25 MCG/INH inhaler, Inhale 1 puff Every Morning., Disp: 4 each, Rfl: 0  •  gabapentin (NEURONTIN) 600 MG tablet, Take 1 tablet by mouth Daily., Disp: 30 tablet, Rfl: 3  •  hydrOXYzine pamoate (VISTARIL) 50 MG capsule, 1 po bid and 2 po qhs, Disp: 120 capsule, Rfl: 5  •  lisinopril (PRINIVIL,ZESTRIL) 10 MG tablet, Take 1 tablet by mouth Daily., Disp: 30 tablet, Rfl: 5  •  LORazepam (ATIVAN) 1 MG tablet, Take 1 tablet by mouth Every Night., Disp: 30 tablet, Rfl: 3  •  metFORMIN (GLUCOPHAGE) 500 MG tablet, Take 1 tablet by mouth 2 (Two) Times a Day With Meals., Disp: 60 tablet, Rfl: 5  •  mometasone (NASONEX) 50 MCG/ACT nasal spray, Administer 2 sprays both nostrils once daily, Disp: 1 each, Rfl: 5  •  montelukast (SINGULAIR) 10 MG tablet, Take 1 tablet by mouth Daily., Disp: 30 tablet, Rfl: 5  •  pravastatin (PRAVACHOL) 40 MG tablet, Take 1 tablet by mouth Daily., Disp: 30 tablet, Rfl: 5  •  sildenafil (REVATIO) 20 MG tablet, Take 2-5 po qd prn, Disp: 60 tablet, Rfl: 5  •  tamsulosin (FLOMAX) 0.4 MG capsule 24 hr capsule, TAKE 1 CAPSULE BY MOUTH EVERY NIGHT AT BEDTIME., Disp: 30 capsule, Rfl: 5  •  triamcinolone (KENALOG) 0.1 % cream, Apply 1 application topically to the appropriate area as directed 2 (Two) Times a Day for 14 days. APPLY TO AFFECTED AREA, Disp: 80 g, Rfl: 0  •  Zoster Vac Recomb Adjuvanted (Shingrix) 50 MCG/0.5ML reconstituted suspension, Inject 0.5 mL  "into the appropriate muscle as directed by prescriber See Admin Instructions. Repeat in 2-6 months, Disp: 1 each, Rfl: 1      Objective     Objective   Vital Signs:  Blood Pressure 150/70   Pulse 65   Temperature 97.3 °F (36.3 °C) (Temporal)   Height 160 cm (63\")   Weight 73.9 kg (163 lb)   Oxygen Saturation 91%   Body Mass Index 28.87 kg/m²   Estimated body mass index is 28.87 kg/m² as calculated from the following:    Height as of this encounter: 160 cm (63\").    Weight as of this encounter: 73.9 kg (163 lb).    BMI is >= 25 and <30. (Overweight) The following options were offered after discussion;: weight loss educational material (shared in after visit summary)    Past Medical History:   Diagnosis Date   • Allergic rhinitis    • Benign prostatic hypertrophy    • Chronic insomnia    • COPD (chronic obstructive pulmonary disease) (HCC)    • Diabetes mellitus (HCC)    • Diabetic neuropathy (HCC)    • Erectile dysfunction    • Fatty liver, alcoholic    • Hyperlipidemia    • Hypertension    • Seborrheic keratosis    • Vitamin B 12 deficiency      Past Surgical History:   Procedure Laterality Date   • CHOLECYSTECTOMY       Social History     Socioeconomic History   • Marital status:    Tobacco Use   • Smoking status: Never Smoker   • Smokeless tobacco: Current User     Types: Chew   Substance and Sexual Activity   • Alcohol use: Yes   • Drug use: No        Physical Exam  Constitutional:       General: He is not in acute distress.     Appearance: Normal appearance. He is not ill-appearing, toxic-appearing or diaphoretic.   HENT:      Head: Normocephalic and atraumatic.      Comments: He is somewhat hard of hearing.   Eyes:      General: No scleral icterus.        Right eye: No discharge.         Left eye: No discharge.   Cardiovascular:      Rate and Rhythm: Normal rate and regular rhythm.      Heart sounds: Normal heart sounds. No murmur heard.    No friction rub. No gallop.   Pulmonary:      Effort: " Pulmonary effort is normal. No respiratory distress.      Breath sounds: No wheezing or rales.   Skin:     General: Skin is warm.      Capillary Refill: Capillary refill takes less than 2 seconds.      Findings: Rash (Dry, flaking skin with minimal underlying erythema noted of the mid, dorsal aspect of the right index finger. ) present.      Comments: Intact cap refill right index finger.    Neurological:      General: No focal deficit present.      Mental Status: He is alert and oriented to person, place, and time. Mental status is at baseline.      Sensory: No sensory deficit.      Comments: No sensory deficit reported. Sensation symmetrical.    Psychiatric:         Mood and Affect: Mood normal.         Behavior: Behavior normal.         Thought Content: Thought content normal.         Judgment: Judgment normal.        Result Review :  The following data was reviewed by: TAWANDA Mckenna on 08/01/2022:  Office Visit on 05/02/2022   Component Date Value Ref Range Status   • Glucose 05/02/2022 87  65 - 99 mg/dL Final   • BUN 05/02/2022 17  8 - 23 mg/dL Final   • Creatinine 05/02/2022 1.03  0.76 - 1.27 mg/dL Final   • Sodium 05/02/2022 142  136 - 145 mmol/L Final   • Potassium 05/02/2022 5.6 (A) 3.5 - 5.2 mmol/L Final   • Chloride 05/02/2022 104  98 - 107 mmol/L Final   • CO2 05/02/2022 24.0  22.0 - 29.0 mmol/L Final   • Calcium 05/02/2022 9.3  8.6 - 10.5 mg/dL Final   • Total Protein 05/02/2022 6.7  6.0 - 8.5 g/dL Final   • Albumin 05/02/2022 4.20  3.50 - 5.20 g/dL Final   • ALT (SGPT) 05/02/2022 46 (A) 1 - 41 U/L Final   • AST (SGOT) 05/02/2022 47 (A) 1 - 40 U/L Final   • Alkaline Phosphatase 05/02/2022 44  39 - 117 U/L Final   • Total Bilirubin 05/02/2022 0.3  0.0 - 1.2 mg/dL Final   • Globulin 05/02/2022 2.5  gm/dL Final   • A/G Ratio 05/02/2022 1.7  g/dL Final   • BUN/Creatinine Ratio 05/02/2022 16.5  7.0 - 25.0 Final   • Anion Gap 05/02/2022 14.0  5.0 - 15.0 mmol/L Final   • eGFR 05/02/2022 73.9  >60.0  mL/min/1.73 Final    National Kidney Foundation and American Society of Nephrology (ASN) Task Force recommended calculation based on the Chronic Kidney Disease Epidemiology Collaboration (CKD-EPI) equation refit without adjustment for race.   • Total Cholesterol 05/02/2022 149  0 - 200 mg/dL Final   • Triglycerides 05/02/2022 93  0 - 150 mg/dL Final   • HDL Cholesterol 05/02/2022 69 (A) 40 - 60 mg/dL Final   • LDL Cholesterol  05/02/2022 63  0 - 100 mg/dL Final   • VLDL Cholesterol 05/02/2022 17  5 - 40 mg/dL Final   • LDL/HDL Ratio 05/02/2022 0.89   Final   • Hemoglobin A1C 05/02/2022 6.40 (A) 4.80 - 5.60 % Final   • Microalbumin, Urine 05/02/2022 25.2  mg/dL Final   • WBC 05/02/2022 6.01  3.40 - 10.80 10*3/mm3 Final   • RBC 05/02/2022 4.69  4.14 - 5.80 10*6/mm3 Final   • Hemoglobin 05/02/2022 13.8  13.0 - 17.7 g/dL Final   • Hematocrit 05/02/2022 39.9  37.5 - 51.0 % Final   • MCV 05/02/2022 85.1  79.0 - 97.0 fL Final   • MCH 05/02/2022 29.4  26.6 - 33.0 pg Final   • MCHC 05/02/2022 34.6  31.5 - 35.7 g/dL Final   • RDW 05/02/2022 15.7 (A) 12.3 - 15.4 % Final   • RDW-SD 05/02/2022 48.0  37.0 - 54.0 fl Final   • MPV 05/02/2022 10.4  6.0 - 12.0 fL Final   • Platelets 05/02/2022 199  140 - 450 10*3/mm3 Final   • Neutrophil % 05/02/2022 62.6  42.7 - 76.0 % Final   • Lymphocyte % 05/02/2022 25.6  19.6 - 45.3 % Final   • Monocyte % 05/02/2022 7.5  5.0 - 12.0 % Final   • Eosinophil % 05/02/2022 3.5  0.3 - 6.2 % Final   • Basophil % 05/02/2022 0.5  0.0 - 1.5 % Final   • Immature Grans % 05/02/2022 0.3  0.0 - 0.5 % Final   • Neutrophils, Absolute 05/02/2022 3.76  1.70 - 7.00 10*3/mm3 Final   • Lymphocytes, Absolute 05/02/2022 1.54  0.70 - 3.10 10*3/mm3 Final   • Monocytes, Absolute 05/02/2022 0.45  0.10 - 0.90 10*3/mm3 Final   • Eosinophils, Absolute 05/02/2022 0.21  0.00 - 0.40 10*3/mm3 Final   • Basophils, Absolute 05/02/2022 0.03  0.00 - 0.20 10*3/mm3 Final   • Immature Grans, Absolute 05/02/2022 0.02  0.00 - 0.05  10*3/mm3 Final   • [facility] 05/02/2022 0.0  0.0 - 0.2 /100 WBC Final      Data reviewed: Recent labs from 05/2022.         Assessment / Plan         Assessment   Diagnoses and all orders for this visit:    1.Atopic dermatitis, mild, right index finger  • We will try triamcinolone 0.1% cream twice daily x2 weeks.  • Encouraged regular use of moisturizer to the area.  • Avoid contact with irritant chemicals.   -     triamcinolone (KENALOG) 0.1 % cream; Apply 1 application topically to the appropriate area as directed 2 (Two) Times a Day for 14 days. APPLY TO AFFECTED AREA  Dispense: 80 g; Refill: 0    2. COPD mixed type (HCC)  • Without acute exacerbation.  • Patient requesting refills of his Trelegy inhaler, he was given 4 in the office today.  -     Fluticasone-Umeclidin-Vilant (TRELEGY) 100-62.5-25 MCG/INH inhaler; Inhale 1 puff Every Morning.  Dispense: 4 each; Refill: 0    3. Hyperkalemia   • Previous routine labs from 5/2022 revealed mild hyperkalemia at 5.6 without evidence of documented hemolysis.    • Patient is chronically on lisinopril and may need to increase his dose due to hypertension.  We will hold off on increasing for now and obtain a repeat CMP.     4. Elevated LFTs  • Mildly elevated transaminases on last CMP from 5/2022.  • Patient documented with fatty liver disease.  • Will repeat CMP for monitoring.    5. Essential HTN:  · Patient's home blood pressure monitor is broken, therefore he does not monitor routinely.  BP elevated initially at 180/80.  He takes lisinopril 10 mg daily at home and reports taking it approximately 1 hour prior to presentation to the office.    · Repeat BP after sitting in the room was improved at 150/70.  He denies any headache, flushing, vision changes, chest pains, or numbness and tingling sensations.  He reports compliance with his lisinopril.  · Monitoring for hyperkalemia as noted above with repeat CMP.  · Recommended obtaining a new BP monitor for home if possible.   Patient can also come by the office for monitoring.  · Avoid sodium in the diet.    6. Chronic seasonal allergies:  · Patient reports recent rhinorrhea.  He has been taking Vistaril, Singulair, cetirizine and using his Trelegy inhaler.    · He denies use of his home Nasonex, but does report that he has it available if needed.  · Recommended continuing current regimen and adding in his home Nasonex if needed.  · Avoid allergens when possible.    7. Type II DM, non-insulin-dependent:  · Managed on metformin 500 mg twice daily.    · Recent HA1c 6.4% 5/2/2022, showing improvement.  · Since he is having labs drawn and will have been >3 months, recheck next visit.       New Medications Ordered This Visit   Medications   • triamcinolone (KENALOG) 0.1 % cream     Sig: Apply 1 application topically to the appropriate area as directed 2 (Two) Times a Day for 14 days. APPLY TO AFFECTED AREA     Dispense:  80 g     Refill:  0   • Fluticasone-Umeclidin-Vilant (TRELEGY) 100-62.5-25 MCG/INH inhaler     Sig: Inhale 1 puff Every Morning.     Dispense:  4 each     Refill:  0     Health Maintenance  • Shingles vaccine ordered at last visit, will attempt to confirm administration.  • Patient completed a pneumonia vaccine series with a PCV13, followed by PPSV23.  • Repeat HA1C pending for next visit.  • Patient will be due for an influenza vaccine this fall.  • Up-to-date on diabetic eye exam and fasting lipid panel.  • Next colonoscopy will be due 8/2024.    Follow Up   Return Keep scheduled follow up with Dr. Medrano., for Next scheduled follow up.    Patient was given instructions and counseling regarding his condition or for health maintenance advice. Please see specific information pulled into the AVS if appropriate.       This document has been electronically signed by TAWANDA Mckenna   August 1, 2022 09:57 EDT    Dictated Utilizing Dragon Dictation: Part of this note may be an electronic transcription/translation of spoken  language to printed text using the Dragon Dictation System.

## 2022-08-01 NOTE — TELEPHONE ENCOUNTER
Please let Mr. Merida know he needs to get labs in the next 1-2 weeks before his follow up with Dr. Medrano. Also check and see if he got his Shingles vaccine Dr. Medrano had previously ordered for him. Thanks.       Pt's spouse is aware of this information.

## 2022-08-09 ENCOUNTER — LAB (OUTPATIENT)
Dept: FAMILY MEDICINE CLINIC | Facility: CLINIC | Age: 82
End: 2022-08-09

## 2022-08-09 DIAGNOSIS — E87.5 HYPERKALEMIA: ICD-10-CM

## 2022-08-09 DIAGNOSIS — E11.9 TYPE 2 DIABETES MELLITUS WITHOUT COMPLICATION, WITHOUT LONG-TERM CURRENT USE OF INSULIN: ICD-10-CM

## 2022-08-09 LAB
ALBUMIN SERPL-MCNC: 4.5 G/DL (ref 3.5–5.2)
ALBUMIN/GLOB SERPL: 1.8 G/DL
ALP SERPL-CCNC: 42 U/L (ref 39–117)
ALT SERPL W P-5'-P-CCNC: 31 U/L (ref 1–41)
ANION GAP SERPL CALCULATED.3IONS-SCNC: 16 MMOL/L (ref 5–15)
AST SERPL-CCNC: 33 U/L (ref 1–40)
BILIRUB SERPL-MCNC: 0.5 MG/DL (ref 0–1.2)
BUN SERPL-MCNC: 19 MG/DL (ref 8–23)
BUN/CREAT SERPL: 15.6 (ref 7–25)
CALCIUM SPEC-SCNC: 9.6 MG/DL (ref 8.6–10.5)
CHLORIDE SERPL-SCNC: 102 MMOL/L (ref 98–107)
CO2 SERPL-SCNC: 22 MMOL/L (ref 22–29)
CREAT SERPL-MCNC: 1.22 MG/DL (ref 0.76–1.27)
EGFRCR SERPLBLD CKD-EPI 2021: 60.3 ML/MIN/1.73
GLOBULIN UR ELPH-MCNC: 2.5 GM/DL
GLUCOSE SERPL-MCNC: 176 MG/DL (ref 65–99)
HBA1C MFR BLD: 6.7 % (ref 4.8–5.6)
POTASSIUM SERPL-SCNC: 4.5 MMOL/L (ref 3.5–5.2)
PROT SERPL-MCNC: 7 G/DL (ref 6–8.5)
SODIUM SERPL-SCNC: 140 MMOL/L (ref 136–145)

## 2022-08-09 PROCEDURE — 83036 HEMOGLOBIN GLYCOSYLATED A1C: CPT | Performed by: PHYSICIAN ASSISTANT

## 2022-08-09 PROCEDURE — 80053 COMPREHEN METABOLIC PANEL: CPT | Performed by: PHYSICIAN ASSISTANT

## 2022-08-12 DIAGNOSIS — N40.1 BENIGN NON-NODULAR PROSTATIC HYPERPLASIA WITH LOWER URINARY TRACT SYMPTOMS: ICD-10-CM

## 2022-08-12 DIAGNOSIS — F51.04 CHRONIC INSOMNIA: ICD-10-CM

## 2022-08-12 DIAGNOSIS — E11.42 TYPE 2 DIABETES MELLITUS WITH PERIPHERAL NEUROPATHY: ICD-10-CM

## 2022-08-12 RX ORDER — TAMSULOSIN HYDROCHLORIDE 0.4 MG/1
1 CAPSULE ORAL
Qty: 30 CAPSULE | Refills: 5 | Status: CANCELLED | OUTPATIENT
Start: 2022-08-12

## 2022-08-12 RX ORDER — LORAZEPAM 1 MG/1
1 TABLET ORAL NIGHTLY
Qty: 30 TABLET | Refills: 0 | Status: SHIPPED | OUTPATIENT
Start: 2022-08-12 | End: 2022-09-03 | Stop reason: SDUPTHER

## 2022-08-12 RX ORDER — GABAPENTIN 600 MG/1
600 TABLET ORAL DAILY
Qty: 30 TABLET | Refills: 0 | Status: SHIPPED | OUTPATIENT
Start: 2022-08-12 | End: 2022-09-03 | Stop reason: SDUPTHER

## 2022-08-12 RX ORDER — TAMSULOSIN HYDROCHLORIDE 0.4 MG/1
1 CAPSULE ORAL
Qty: 30 CAPSULE | Refills: 5 | Status: SHIPPED | OUTPATIENT
Start: 2022-08-12 | End: 2022-09-03 | Stop reason: SDUPTHER

## 2022-08-12 NOTE — TELEPHONE ENCOUNTER
Caller: PHARMACY - [address]     Relationship: Self    Best call back number: [phone]    Requested Prescriptions:   Requested Prescriptions     Pending Prescriptions Disp Refills   • gabapentin (NEURONTIN) 600 MG tablet 30 tablet 3     Sig: Take 1 tablet by mouth Daily.   • LORazepam (ATIVAN) 1 MG tablet 30 tablet 3     Sig: Take 1 tablet by mouth Every Night.        Pharmacy where request should be sent: [facility] - [address] - [phone]  - [phone]     Additional details provided by patient:   Does the patient have less than a 3 day supply:  [x] Yes  [] No    Levi Mackenzie   08/12/22 16:07 EDT

## 2022-08-12 NOTE — TELEPHONE ENCOUNTER
Caller: Maris Meridae    Relationship: Self    Best call back number: [phone]     Requested Prescriptions:   Requested Prescriptions     Pending Prescriptions Disp Refills   • tamsulosin (FLOMAX) 0.4 MG capsule 24 hr capsule 30 capsule 5     Sig: Take 1 capsule by mouth every night at bedtime.        Pharmacy where request should be sent: [address] - [phone]  - [phone]     Additional details provided by patient: COMPLETELY OUT    Does the patient have less than a 3 day supply:  [x] Yes  [] No    Levi MCINTYRE Rep   08/12/22 09:42 EDT

## 2022-09-02 ENCOUNTER — OFFICE VISIT (OUTPATIENT)
Dept: FAMILY MEDICINE CLINIC | Facility: CLINIC | Age: 82
End: 2022-09-02

## 2022-09-02 DIAGNOSIS — E53.8 VITAMIN B 12 DEFICIENCY: ICD-10-CM

## 2022-09-02 DIAGNOSIS — K70.0 FATTY LIVER, ALCOHOLIC: ICD-10-CM

## 2022-09-02 DIAGNOSIS — I10 ESSENTIAL HYPERTENSION: ICD-10-CM

## 2022-09-02 DIAGNOSIS — E78.49 OTHER HYPERLIPIDEMIA: ICD-10-CM

## 2022-09-02 DIAGNOSIS — J44.9 COPD MIXED TYPE: ICD-10-CM

## 2022-09-02 DIAGNOSIS — E55.9 VITAMIN D DEFICIENCY: ICD-10-CM

## 2022-09-02 DIAGNOSIS — Z87.891 FORMER SMOKER: ICD-10-CM

## 2022-09-02 DIAGNOSIS — Z00.00 HEALTHCARE MAINTENANCE: ICD-10-CM

## 2022-09-02 DIAGNOSIS — E11.42 TYPE 2 DIABETES MELLITUS WITH PERIPHERAL NEUROPATHY: ICD-10-CM

## 2022-09-02 DIAGNOSIS — L98.9 SKIN LESION OF FACE: ICD-10-CM

## 2022-09-02 DIAGNOSIS — E78.2 MIXED HYPERLIPIDEMIA: ICD-10-CM

## 2022-09-02 DIAGNOSIS — N52.01 ERECTILE DYSFUNCTION DUE TO ARTERIAL INSUFFICIENCY: ICD-10-CM

## 2022-09-02 DIAGNOSIS — F51.04 CHRONIC INSOMNIA: ICD-10-CM

## 2022-09-02 DIAGNOSIS — J30.2 CHRONIC SEASONAL ALLERGIC RHINITIS: Primary | ICD-10-CM

## 2022-09-02 DIAGNOSIS — J30.9 CHRONIC ALLERGIC RHINITIS: ICD-10-CM

## 2022-09-02 DIAGNOSIS — N40.1 BENIGN NON-NODULAR PROSTATIC HYPERPLASIA WITH LOWER URINARY TRACT SYMPTOMS: ICD-10-CM

## 2022-09-02 PROCEDURE — 17110 DESTRUCTION B9 LES UP TO 14: CPT | Performed by: GENERAL PRACTICE

## 2022-09-02 PROCEDURE — 99214 OFFICE O/P EST MOD 30 MIN: CPT | Performed by: GENERAL PRACTICE

## 2022-09-02 NOTE — PROGRESS NOTES
Cryotherapy Procedure Note    Pre-operative Diagnosis: Seborrheic keratosis    Post-operative Diagnosis: Same    Locations: Right upper forehead    Indications: Ablation    Anesthesia: None     Procedure Details   Patient informed of risks (permanent scarring, infection, light or dark discoloration, bleeding, infection, weakness, numbness and recurrence of the lesion) and benefits of the procedure and verbal informed consent obtained.    The areas are treated with liquid nitrogen therapy, frozen until ice ball extended 2 mm beyond lesion, allowed to thaw, and treated again. The patient tolerated procedure well.  The patient was instructed on post-op care, warned that there may be blister formation, redness and pain. Recommend OTC analgesia as needed for pain.    Condition:  Stable    Complications:  None.    Plan:  1. Instructed to keep the area dry and covered for 24-48h and clean thereafter.  2. Warning signs of infection were reviewed.    3. Recommended that the patient use OTC acetaminophen as needed for pain.   4. Return in 1 month if lesion has not resolved entirely.

## 2022-09-02 NOTE — PROGRESS NOTES
Subjective   Joselito Merida is a 81 y.o. male.     Chief Complaint  He returns for a scheduled reassessment of multiple medical problems including type 2 diabetes mellitus, hyperlipidemia, essential hypertension, chronic obstructive pulmonary disease, chronic right hip pain, and a lesion over his right upper forehead    History of Present Illness     Skin Lesion  He gives an approximate 6-month history of a lesion over his right upper forehead.  This appeared suddenly and has increased gradually in size.  With exception of mild pruritus he denies any symptoms.    Type 2 Diabetes Mellitus  He admits to paresthesias of the feet.  He continues to deny any visual disturbances, polydipsia, polyuria, hypoglycemia or foot ulcerations. Evaluation to date has been: hemoglobin A1C. Home sugars: patient does not check sugars. Current treatments: metformin.  He remains on gabapentin for his paresthesias with some improvement.    Lab Results   Component Value Date    HGBA1C 6.70 (H) 08/09/2022     Lab Results   Component Value Date    MICROALBUR 25.2 05/02/2022     Hyperlipidemia  His compliance with treatment has been fair.  He is fairly active about the house and yard.  He remains on pravastatin with no apparent side effects  Lab Results   Component Value Date    CHOL 149 05/02/2022    CHLPL 150 11/16/2021    TRIG 93 05/02/2022    HDL 69 (H) 05/02/2022    LDL 63 05/02/2022     Essential Hypertension  Home blood pressure readings: not doing. Associated signs and symptoms: dyspnea.  He continues to deny any chest pain, palpitations, orthopnea, paroxysmal nocturnal dyspnea or peripheral edema. Current antihypertensive medications includes lisinopril.   Lab Results   Component Value Date    GLUCOSE 176 (H) 08/09/2022    BUN 19 08/09/2022    CREATININE 1.22 08/09/2022    EGFRIFNONA 56 (L) 11/16/2021    EGFRIFAFRI 68 11/16/2021    BCR 15.6 08/09/2022    K 4.5 08/09/2022    CO2 22.0 08/09/2022    CALCIUM 9.6 08/09/2022    PROTENTOTREF  6.7 11/16/2021    ALBUMIN 4.50 08/09/2022    LABIL2 1.8 11/16/2021    AST 33 08/09/2022    ALT 31 08/09/2022     Lab Results   Component Value Date    ALKPHOS 42 08/09/2022     Chronic Allergic Rhinitis  He continues to experience intermittent rhinorrhea, nasal congestion, and postnasal drip.  He continues to deny any other upper respiratory tract symptoms and there is no history of any fever or chills.  He has tried several of the second-generation antihistamines with little impact on his symptoms.  He is currently taking montelukast in the morning and diphenhydramine in the evening with fairly good control of his symptoms    COPD  He feels that his cough and shortness of breath remain at baseline. He continues to deny any chest pain, hemoptysis, fever or chills. He states he is able to walk on level ground with no symptoms but continues to have trouble with inclines, stairs, or lifting. He is following the treatment plan, including medications as directed. He has not been using his maintenance inhaler since last here. He has not required a rescue inhaler for some time. He has a smoking history of 40 pack years.    Right Hip Pain  He gives an over 1 year history of intermittent right thigh pain.  He denies any strain or trauma nor any change in his activities.  The pain is described as a sharp ache with prolonged weightbearing.  This does not radiate elsewhere and is been unassociated with any other symptoms.  There is no history of any stiffness or swelling and he denies any changes in his strength or sensation.  He continues to deny any limitation of his activities or sleep at present.  He is right-hand dominant. Plain films of the right hip performed on 1/27/2022 were unremarkable    Labs  Lab Results   Component Value Date    WBC 6.01 05/02/2022    HGB 13.8 05/02/2022    HCT 39.9 05/02/2022    MCV 85.1 05/02/2022     05/02/2022     The following portions of the patient's history were reviewed and updated  as appropriate: allergies, current medications, past medical history, past social history and problem list.    Review of Systems   Constitutional: Positive for fatigue. Negative for appetite change, chills, fever and unexpected weight change.   HENT: Positive for rhinorrhea and sneezing. Negative for congestion, ear pain, postnasal drip, sinus pressure and sore throat.    Eyes: Negative for visual disturbance.   Respiratory: Positive for cough and shortness of breath. Negative for wheezing.    Cardiovascular: Negative for chest pain, palpitations and leg swelling.   Gastrointestinal: Negative for abdominal pain, blood in stool, constipation, diarrhea, nausea and vomiting.   Endocrine: Negative for polydipsia and polyuria.   Genitourinary: Negative for dysuria, frequency, hematuria and urgency.        Nocturia with decreased stream and occasional sense of incomplete voiding   Musculoskeletal: Positive for arthralgias. Negative for back pain and myalgias.   Skin: Negative for rash.        Lesion right forehead   Neurological: Positive for numbness. Negative for weakness and headaches.   Psychiatric/Behavioral: Positive for sleep disturbance. Negative for dysphoric mood. The patient is not nervous/anxious.      Objective   Physical Exam  Constitutional:       General: He is not in acute distress.     Appearance: Normal appearance. He is well-developed. He is not ill-appearing or diaphoretic.      Comments: Bright and in fair spirits.  Normal gait.  No apparent distress. No pallor, jaundice, diaphoresis, or cyanosis.   HENT:      Head: Atraumatic.      Right Ear: Tympanic membrane, ear canal and external ear normal.      Left Ear: Tympanic membrane, ear canal and external ear normal.   Eyes:      Conjunctiva/sclera: Conjunctivae normal.   Neck:      Thyroid: No thyroid mass or thyromegaly.      Vascular: No carotid bruit or JVD.      Trachea: Trachea normal. No tracheal deviation.   Cardiovascular:      Rate and Rhythm:  Normal rate and regular rhythm.      Heart sounds: Normal heart sounds, S1 normal and S2 normal. No murmur heard.    No gallop. No S3 or S4 sounds.   Pulmonary:      Effort: Pulmonary effort is normal.      Breath sounds: Examination of the right-lower field reveals decreased breath sounds. Examination of the left-lower field reveals decreased breath sounds. Decreased breath sounds and wheezing (diffuse - mild) present. No rales.      Comments: Pulmonary hyperinflation  Chest:   Breasts:      Right: No supraclavicular adenopathy.      Left: No supraclavicular adenopathy.       Abdominal:      General: Bowel sounds are normal. There is no distension.   Musculoskeletal:      Lumbar back: Negative right straight leg raise test and negative left straight leg raise test.      Right lower leg: No edema.      Left lower leg: No edema.   Lymphadenopathy:      Head:      Right side of head: No submental, submandibular, tonsillar, preauricular, posterior auricular or occipital adenopathy.      Left side of head: No submental, submandibular, tonsillar, preauricular, posterior auricular or occipital adenopathy.      Cervical: No cervical adenopathy.      Upper Body:      Right upper body: No supraclavicular adenopathy.      Left upper body: No supraclavicular adenopathy.   Skin:     General: Skin is warm and dry.      Coloration: Skin is not cyanotic, jaundiced or pale.      Findings: Lesion (7-8 millimeter uniformly pigmented papule with verrucous surface right upper forehead) present. No rash.      Nails: There is no clubbing.   Neurological:      Mental Status: He is alert and oriented to person, place, and time.      Cranial Nerves: No cranial nerve deficit.      Sensory: Sensory deficit (decreased vibration sense both feet) present.      Motor: No tremor.      Coordination: Coordination normal.      Gait: Gait normal.   Psychiatric:         Attention and Perception: Attention normal.         Mood and Affect: Mood normal.          Speech: Speech normal.         Behavior: Behavior normal.         Thought Content: Thought content normal.       Assessment & Plan   Problems Addressed this Visit        Allergies and Adverse Reactions    Chronic seasonal allergic rhinitis   Reminded regarding allergen avoidance.  Continue current medication  Encouraged to report if any worse or if any new symptoms or concerns.    Relevant Medications    montelukast (SINGULAIR) 10 MG tablet    hydrOXYzine pamoate (VISTARIL) 50 MG capsule    cetirizine (RA Allergy Relief) 10 MG tablet       Cardiac and Vasculature    Essential hypertension   Hypertension: marginal. Evidence of target organ damage: none.  Encouraged to continue to work on diet and exercise plan.   Continue current medication    Relevant Medications    lisinopril (PRINIVIL,ZESTRIL) 10 MG tablet    Mixed hyperlipidemia  As above.   Continue current medication.    Relevant Medications    pravastatin (PRAVACHOL) 40 MG tablet       Endocrine and Metabolic    Type 2 diabetes mellitus with peripheral neuropathy (HCC)  Diabetes mellitus Type II, under excellent control.   Encouraged to continue to pursue ADA diet  Encouraged aerobic exercise.  Continue current medication    Relevant Medications    metFORMIN (GLUCOPHAGE) 500 MG tablet    gabapentin (NEURONTIN) 600 MG tablet    aspirin 81 MG EC tablet    Vitamin B 12 deficiency    Relevant Medications    cyanocobalamin (VITAMIN B-12) 1000 MCG tablet    Vitamin D deficiency    Relevant Medications    cholecalciferol (Vitamin D) 25 MCG (1000 UT) tablet       Gastrointestinal Abdominal     Fatty liver, alcoholic       Genitourinary and Reproductive     Benign non-nodular prostatic hyperplasia with lower urinary tract symptoms    Relevant Medications    tamsulosin (FLOMAX) 0.4 MG capsule 24 hr capsule    Vasculogenic erectile dysfunction    Relevant Medications    sildenafil (REVATIO) 20 MG tablet       Health Encounters    Healthcare maintenance  Encouraged  to obtain an updated bivalent COVID-19 vaccination along with a flu shot when available  Reminded that he is due for Shingrix and an updated Tdap       Pulmonary and Pneumonias    COPD mixed type (HCC)   COPD is stable.  Encouraged to remain off cigarettes  Encouraged to remain as active as symptoms allow for  Continue current medication    Relevant Medications    montelukast (SINGULAIR) 10 MG tablet    Fluticasone-Umeclidin-Vilant (TRELEGY) 100-62.5-25 MCG/INH inhaler    cetirizine (RA Allergy Relief) 10 MG tablet       Skin    Skin lesion of face  Appearance most consistent with an SK  Reviewed treatment options and agreed on a trial of cryotherapy. See procedure note.       Sleep    Chronic insomnia    Relevant Medications    LORazepam (ATIVAN) 1 MG tablet    hydrOXYzine pamoate (VISTARIL) 50 MG capsule       Tobacco    Former smoker      Diagnoses       Codes Comments    Chronic seasonal allergic rhinitis    -  Primary ICD-10-CM: J30.2  ICD-9-CM: 477.8     Essential hypertension     ICD-10-CM: I10  ICD-9-CM: 401.9     Mixed hyperlipidemia     ICD-10-CM: E78.2  ICD-9-CM: 272.2     Type 2 diabetes mellitus with peripheral neuropathy (HCC)     ICD-10-CM: E11.42  ICD-9-CM: 250.60, 357.2     Vitamin B 12 deficiency     ICD-10-CM: E53.8  ICD-9-CM: 266.2     Vitamin D deficiency     ICD-10-CM: E55.9  ICD-9-CM: 268.9     Fatty liver, alcoholic     ICD-10-CM: K70.0  ICD-9-CM: 571.0     Benign non-nodular prostatic hyperplasia with lower urinary tract symptoms     ICD-10-CM: N40.1  ICD-9-CM: 600.91     Erectile dysfunction due to arterial insufficiency     ICD-10-CM: N52.01  ICD-9-CM: 607.84     Healthcare maintenance     ICD-10-CM: Z00.00  ICD-9-CM: V70.0     COPD mixed type (HCC)     ICD-10-CM: J44.9  ICD-9-CM: 496     Chronic insomnia     ICD-10-CM: F51.04  ICD-9-CM: 780.52     Former smoker     ICD-10-CM: Z87.891  ICD-9-CM: V15.82     Skin lesion of face     ICD-10-CM: L98.9  ICD-9-CM: 709.9     Chronic allergic  rhinitis     ICD-10-CM: J30.9  ICD-9-CM: 477.9     Other hyperlipidemia     ICD-10-CM: E78.49  ICD-9-CM: 272.4

## 2022-09-03 VITALS
SYSTOLIC BLOOD PRESSURE: 140 MMHG | RESPIRATION RATE: 14 BRPM | HEART RATE: 61 BPM | WEIGHT: 162 LBS | HEIGHT: 63 IN | BODY MASS INDEX: 28.7 KG/M2 | DIASTOLIC BLOOD PRESSURE: 72 MMHG | OXYGEN SATURATION: 95 % | TEMPERATURE: 98.6 F

## 2022-09-03 RX ORDER — MELATONIN
1000 DAILY
Qty: 90 TABLET | Refills: 3 | Status: SHIPPED | OUTPATIENT
Start: 2022-09-03

## 2022-09-03 RX ORDER — ASPIRIN 81 MG/1
81 TABLET ORAL DAILY
Qty: 90 TABLET | Refills: 3 | Status: SHIPPED | OUTPATIENT
Start: 2022-09-03 | End: 2023-01-07

## 2022-09-03 RX ORDER — TAMSULOSIN HYDROCHLORIDE 0.4 MG/1
1 CAPSULE ORAL
Qty: 90 CAPSULE | Refills: 3 | Status: SHIPPED | OUTPATIENT
Start: 2022-09-03

## 2022-09-03 RX ORDER — MONTELUKAST SODIUM 10 MG/1
10 TABLET ORAL DAILY
Qty: 90 TABLET | Refills: 3 | Status: SHIPPED | OUTPATIENT
Start: 2022-09-03

## 2022-09-03 RX ORDER — CETIRIZINE HYDROCHLORIDE 10 MG/1
10 TABLET ORAL DAILY
Qty: 90 TABLET | Refills: 3 | Status: SHIPPED | OUTPATIENT
Start: 2022-09-03

## 2022-09-03 RX ORDER — LORAZEPAM 1 MG/1
1 TABLET ORAL NIGHTLY
Qty: 90 TABLET | Refills: 0 | Status: SHIPPED | OUTPATIENT
Start: 2022-09-03 | End: 2022-12-09

## 2022-09-03 RX ORDER — PRAVASTATIN SODIUM 40 MG
40 TABLET ORAL DAILY
Qty: 90 TABLET | Refills: 3 | Status: SHIPPED | OUTPATIENT
Start: 2022-09-03 | End: 2023-01-04

## 2022-09-03 RX ORDER — SILDENAFIL CITRATE 20 MG/1
TABLET ORAL
Qty: 60 TABLET | Refills: 5 | Status: SHIPPED | OUTPATIENT
Start: 2022-09-03

## 2022-09-03 RX ORDER — GABAPENTIN 600 MG/1
600 TABLET ORAL DAILY
Qty: 90 TABLET | Refills: 0 | Status: SHIPPED | OUTPATIENT
Start: 2022-09-03 | End: 2022-12-09

## 2022-09-03 RX ORDER — LISINOPRIL 10 MG/1
10 TABLET ORAL DAILY
Qty: 90 TABLET | Refills: 3 | Status: SHIPPED | OUTPATIENT
Start: 2022-09-03

## 2022-09-03 RX ORDER — HYDROXYZINE PAMOATE 50 MG/1
CAPSULE ORAL
Qty: 360 CAPSULE | Refills: 3 | Status: SHIPPED | OUTPATIENT
Start: 2022-09-03

## 2022-10-10 ENCOUNTER — FLU SHOT (OUTPATIENT)
Dept: FAMILY MEDICINE CLINIC | Facility: CLINIC | Age: 82
End: 2022-10-10

## 2022-10-10 DIAGNOSIS — Z23 NEED FOR INFLUENZA VACCINATION: Primary | ICD-10-CM

## 2022-10-10 PROCEDURE — 90662 IIV NO PRSV INCREASED AG IM: CPT | Performed by: GENERAL PRACTICE

## 2022-10-10 PROCEDURE — G0008 ADMIN INFLUENZA VIRUS VAC: HCPCS | Performed by: GENERAL PRACTICE

## 2022-10-10 NOTE — PROGRESS NOTES
Injection  Injection performed in left deltoid by Khadijah Jacinto MA. Patient tolerated the procedure well without complications.  10/10/22   Khadijah Jacinto MA

## 2022-12-09 DIAGNOSIS — F51.04 CHRONIC INSOMNIA: ICD-10-CM

## 2022-12-09 DIAGNOSIS — E11.42 TYPE 2 DIABETES MELLITUS WITH PERIPHERAL NEUROPATHY: ICD-10-CM

## 2022-12-09 RX ORDER — LORAZEPAM 1 MG/1
TABLET ORAL
Qty: 90 TABLET | Refills: 0 | Status: SHIPPED | OUTPATIENT
Start: 2022-12-09 | End: 2023-03-08

## 2022-12-09 RX ORDER — GABAPENTIN 600 MG/1
TABLET ORAL
Qty: 90 TABLET | Refills: 0 | Status: SHIPPED | OUTPATIENT
Start: 2022-12-09 | End: 2023-03-08

## 2023-01-04 DIAGNOSIS — E78.49 OTHER HYPERLIPIDEMIA: ICD-10-CM

## 2023-01-04 RX ORDER — PRAVASTATIN SODIUM 40 MG
TABLET ORAL
Qty: 30 TABLET | Refills: 5 | Status: SHIPPED | OUTPATIENT
Start: 2023-01-04 | End: 2023-01-07 | Stop reason: SDUPTHER

## 2023-01-06 ENCOUNTER — OFFICE VISIT (OUTPATIENT)
Dept: FAMILY MEDICINE CLINIC | Facility: CLINIC | Age: 83
End: 2023-01-06
Payer: MEDICARE

## 2023-01-06 DIAGNOSIS — I10 ESSENTIAL HYPERTENSION: ICD-10-CM

## 2023-01-06 DIAGNOSIS — E11.42 TYPE 2 DIABETES MELLITUS WITH PERIPHERAL NEUROPATHY: ICD-10-CM

## 2023-01-06 DIAGNOSIS — N40.1 BENIGN NON-NODULAR PROSTATIC HYPERPLASIA WITH LOWER URINARY TRACT SYMPTOMS: ICD-10-CM

## 2023-01-06 DIAGNOSIS — J44.9 COPD MIXED TYPE: ICD-10-CM

## 2023-01-06 DIAGNOSIS — Z87.891 FORMER SMOKER: ICD-10-CM

## 2023-01-06 DIAGNOSIS — J30.2 CHRONIC SEASONAL ALLERGIC RHINITIS: Primary | ICD-10-CM

## 2023-01-06 DIAGNOSIS — Z00.00 HEALTHCARE MAINTENANCE: ICD-10-CM

## 2023-01-06 DIAGNOSIS — E55.9 VITAMIN D DEFICIENCY: ICD-10-CM

## 2023-01-06 DIAGNOSIS — F51.04 CHRONIC INSOMNIA: ICD-10-CM

## 2023-01-06 DIAGNOSIS — K70.0 FATTY LIVER, ALCOHOLIC: ICD-10-CM

## 2023-01-06 DIAGNOSIS — F10.20 ALCOHOL USE DISORDER, MODERATE, DEPENDENCE: ICD-10-CM

## 2023-01-06 DIAGNOSIS — E78.2 MIXED HYPERLIPIDEMIA: ICD-10-CM

## 2023-01-06 DIAGNOSIS — E78.49 OTHER HYPERLIPIDEMIA: ICD-10-CM

## 2023-01-06 DIAGNOSIS — E53.8 VITAMIN B 12 DEFICIENCY: ICD-10-CM

## 2023-01-06 DIAGNOSIS — N52.01 ERECTILE DYSFUNCTION DUE TO ARTERIAL INSUFFICIENCY: ICD-10-CM

## 2023-01-06 PROCEDURE — 85025 COMPLETE CBC W/AUTO DIFF WBC: CPT | Performed by: GENERAL PRACTICE

## 2023-01-06 PROCEDURE — 1170F FXNL STATUS ASSESSED: CPT | Performed by: GENERAL PRACTICE

## 2023-01-06 PROCEDURE — 80053 COMPREHEN METABOLIC PANEL: CPT | Performed by: GENERAL PRACTICE

## 2023-01-06 PROCEDURE — 82607 VITAMIN B-12: CPT | Performed by: GENERAL PRACTICE

## 2023-01-06 PROCEDURE — 84443 ASSAY THYROID STIM HORMONE: CPT | Performed by: GENERAL PRACTICE

## 2023-01-06 PROCEDURE — 82043 UR ALBUMIN QUANTITATIVE: CPT | Performed by: GENERAL PRACTICE

## 2023-01-06 PROCEDURE — 80061 LIPID PANEL: CPT | Performed by: GENERAL PRACTICE

## 2023-01-06 PROCEDURE — 85014 HEMATOCRIT: CPT | Performed by: GENERAL PRACTICE

## 2023-01-06 PROCEDURE — G0439 PPPS, SUBSEQ VISIT: HCPCS | Performed by: GENERAL PRACTICE

## 2023-01-06 PROCEDURE — 83036 HEMOGLOBIN GLYCOSYLATED A1C: CPT | Performed by: GENERAL PRACTICE

## 2023-01-06 PROCEDURE — 1159F MED LIST DOCD IN RCRD: CPT | Performed by: GENERAL PRACTICE

## 2023-01-06 PROCEDURE — 82306 VITAMIN D 25 HYDROXY: CPT | Performed by: GENERAL PRACTICE

## 2023-01-06 PROCEDURE — 82747 ASSAY OF FOLIC ACID RBC: CPT | Performed by: GENERAL PRACTICE

## 2023-01-06 NOTE — PATIENT INSTRUCTIONS
Advance Directive  Advance directives are legal documents that allow you to make decisions about your health care and medical treatment in case you become unable to communicate for yourself. Advance directives let your wishes be known to family, friends, and health care providers.  Discussing and writing advance directives should happen over time rather than all at once. Advance directives can be changed and updated at any time. There are different types of advance directives, such as:  Medical power of .  Living will.  Do not resuscitate (DNR) order or do not attempt resuscitation (DNAR) order.  Health care proxy and medical power of   A health care proxy is also called a health care agent. This person is appointed to make medical decisions for you when you are unable to make decisions for yourself. Generally, people ask a trusted friend or family member to act as their proxy and represent their preferences. Make sure you have an agreement with your trusted person to act as your proxy. A proxy may have to make a medical decision on your behalf if your wishes are not known.  A medical power of , also called a durable power of  for health care, is a legal document that names your health care proxy. Depending on the laws in your state, the document may need to be:  Signed.  Notarized.  Dated.  Copied.  Witnessed.  Incorporated into your medical record.  You may also want to appoint a trusted person to manage your money in the event you are unable to do so. This is called a durable power of  for finances. It is a separate legal document from the durable power of  for health care. You may choose your health care proxy or someone different to act as your agent in money matters.  If you do not appoint a proxy, or there is a concern that the proxy is not acting in your best interest, a court may appoint a guardian to act on your behalf.  Living will  A living will is a set of  instructions that state your wishes about medical care when you cannot express them yourself. Health care providers should keep a copy of your living will in your medical record. You may want to give a copy to family members or friends. To alert caregivers in case of an emergency, you can place a card in your wallet to let them know that you have a living will and where they can find it. A living will is used if you become:  Terminally ill.  Disabled.  Unable to communicate or make decisions.  The following decisions should be included in your living will:  To use or not to use life support equipment, such as dialysis machines and breathing machines (ventilators).  Whether you want a DNR or DNAR order. This tells health care providers not to use cardiopulmonary resuscitation (CPR) if breathing or heartbeat stops.  To use or not to use tube feeding.  To be given or not to be given food and fluids.  Whether you want comfort (palliative) care when the goal becomes comfort rather than a cure.  Whether you want to donate your organs and tissues.  A living will does not give instructions for distributing your money and property if you should pass away.  DNR or DNAR  A DNR or DNAR order is a request not to have CPR in the event that your heart stops beating or you stop breathing. If a DNR or DNAR order has not been made and shared, a health care provider will try to help any patient whose heart has stopped or who has stopped breathing. If you plan to have surgery, talk with your health care provider about how your DNR or DNAR order will be followed if problems occur.  What if I do not have an advance directive?  Some states assign family decision makers to act on your behalf if you do not have an advance directive. Each state has its own laws about advance directives. You may want to check with your health care provider, , or state representative about the laws in your state.  Summary  Advance directives are legal  documents that allow you to make decisions about your health care and medical treatment in case you become unable to communicate for yourself.  The process of discussing and writing advance directives should happen over time. You can change and update advance directives at any time.  Advance directives may include a medical power of , a living will, and a DNR or DNAR order.  This information is not intended to replace advice given to you by your health care provider. Make sure you discuss any questions you have with your health care provider.  Document Revised: 09/21/2021 Document Reviewed: 09/21/2021  [employer] Patient Education © 2022 [employer]  Fall Prevention in the Home, Adult  Falls can cause injuries and can happen to people of all ages. There are many things you can do to make your home safe and to help prevent falls. Ask for help when making these changes.  What actions can I take to prevent falls?  General Instructions  Use good lighting in all rooms. Replace any light bulbs that burn out.  Turn on the lights in dark areas. Use night-lights.  Keep items that you use often in easy-to-reach places. Lower the shelves around your home if needed.  Set up your furniture so you have a clear path. Avoid moving your furniture around.  Do not have throw rugs or other things on the floor that can make you trip.  Avoid walking on wet floors.  If any of your floors are uneven, fix them.  Add color or contrast paint or tape to clearly jeanette and help you see:  Grab bars or handrails.  First and last steps of staircases.  Where the edge of each step is.  If you use a stepladder:  Make sure that it is fully opened. Do not climb a closed stepladder.  Make sure the sides of the stepladder are locked in place.  Ask someone to hold the stepladder while you use it.  Know where your pets are when moving through your home.  What can I do in the bathroom?     Keep the floor dry. Clean up any water on the floor right  away.  Remove soap buildup in the tub or shower.  Use nonskid mats or decals on the floor of the tub or shower.  Attach bath mats securely with double-sided, nonslip rug tape.  If you need to sit down in the shower, use a plastic, nonslip stool.  Install grab bars by the toilet and in the tub and shower. Do not use towel bars as grab bars.  What can I do in the bedroom?  Make sure that you have a light by your bed that is easy to reach.  Do not use any sheets or blankets for your bed that hang to the floor.  Have a firm chair with side arms that you can use for support when you get dressed.  What can I do in the kitchen?  Clean up any spills right away.  If you need to reach something above you, use a step stool with a grab bar.  Keep electrical cords out of the way.  Do not use floor polish or wax that makes floors slippery.  What can I do with my stairs?  Do not leave any items on the stairs.  Make sure that you have a light switch at the top and the bottom of the stairs.  Make sure that there are handrails on both sides of the stairs. Fix handrails that are broken or loose.  Install nonslip stair treads on all your stairs.  Avoid having throw rugs at the top or bottom of the stairs.  Choose a carpet that does not hide the edge of the steps on the stairs.  Check carpeting to make sure that it is firmly attached to the stairs. Fix carpet that is loose or worn.  What can I do on the outside of my home?  Use bright outdoor lighting.  Fix the edges of walkways and driveways and fix any cracks.  Remove anything that might make you trip as you walk through a door, such as a raised step or threshold.  Trim any bushes or trees on paths to your home.  Check to see if handrails are loose or broken and that both sides of all steps have handrails.  Install guardrails along the edges of any raised decks and porches.  Clear paths of anything that can make you trip, such as tools or rocks.  Have leaves, snow, or ice cleared  regularly.  Use sand or salt on paths during winter.  Clean up any spills in your garage right away. This includes grease or oil spills.  What other actions can I take?  Wear shoes that:  Have a low heel. Do not wear high heels.  Have rubber bottoms.  Feel good on your feet and fit well.  Are closed at the toe. Do not wear open-toe sandals.  Use tools that help you move around if needed. These include:  Canes.  Walkers.  Scooters.  Crutches.  Review your medicines with your doctor. Some medicines can make you feel dizzy. This can increase your chance of falling.  Ask your doctor what else you can do to help prevent falls.  Where to find more information  Centers for Disease Control and Prevention, [place]: www.cdc.gov  [employer] [place] [employer] Aging: www.rowan.nih.gov  Contact a doctor if:  You are afraid of falling at home.  You feel weak, drowsy, or dizzy at home.  You fall at home.  Summary  There are many simple things that you can do to make your home safe and to help prevent falls.  Ways to make your home safe include removing things that can make you trip and installing grab bars in the bathroom.  Ask for help when making these changes in your home.  This information is not intended to replace advice given to you by your health care provider. Make sure you discuss any questions you have with your health care provider.  Document Revised: 09/19/2022 Document Reviewed: 07/21/2021  [employer] [facility] Patient Education © 2022 [employer]

## 2023-01-06 NOTE — PROGRESS NOTES
The ABCs of the Annual Wellness Visit  Subsequent Medicare Wellness Visit    Chief Complaint  Subsequent Medicare Wellness Visit    Subjective    History of Present Illness:  Joselito Merida is a 82 y.o. male who presents for a Subsequent Medicare Wellness Visit.    Type 2 Diabetes Mellitus  He admits to paresthesias of the feet.  He continues to deny any visual disturbances, polydipsia, polyuria, hypoglycemia or foot ulcerations. Evaluation to date has been: hemoglobin A1C. Home sugars: patient does not check sugars. Current treatments: metformin.  He remains on gabapentin for his paresthesias and feels it helps.      Hyperlipidemia  His compliance with treatment has been fair.  He is fairly active about the house and yard.  He remains on pravastatin with no apparent side effects    Essential Hypertension  Home blood pressure readings: not doing. Associated signs and symptoms: dyspnea.  He continues to deny any chest pain, palpitations, orthopnea, paroxysmal nocturnal dyspnea or peripheral edema. Current antihypertensive medications includes lisinopril.     Chronic Allergic Rhinitis  He continues to experience intermittent rhinorrhea, nasal congestion, and postnasal drip.  He continues to deny any other upper respiratory tract symptoms and there is no history of any fever or chills.  He has tried several of the second-generation antihistamines with little impact on his symptoms.  He remains on montelukast in the morning and diphenhydramine in the evening with fairly good control of his symptoms    COPD  He feels that his cough and shortness of breath remain at baseline. He continues to deny any chest pain, hemoptysis, fever or chills. He states he is able to walk on level ground with no symptoms but continues to have trouble with inclines, stairs, or lifting. He is following the treatment plan, including medications as directed. He has not been using his maintenance inhaler since last here. He has not required a rescue  inhaler for some time. He has a smoking history of 40 pack years.    Right Hip Pain  He gives an over 1 year history of intermittent right thigh pain.  He denies any strain or trauma nor any change in his activities.  The pain is described as a sharp ache with prolonged weightbearing.  This does not radiate elsewhere and is been unassociated with any other symptoms.  There is no history of any stiffness or swelling and he denies any changes in his strength or sensation.  He continues to deny any limitation of his activities or sleep at present.  He is right-hand dominant. Plain films of the right hip performed on 1/27/2022 were unremarkable    Alcohol Abuse Disorder  He continues to drink 3-5 alcoholic beverages most nights (beer and moonshine).  He experiences acute withdrawal if he goes more than a day or 2 without a drink.  He accepts that he is an alcoholic but is not certain that he is ready to quit at present.  He successfully quit for nearly 30 years in the past.    The following portions of the patient's history were reviewed and   updated as appropriate: allergies, current medications, past family history, past medical history, past social history, past surgical history and problem list.    Compared to one year ago, the patient feels his physical   health is the same.    Compared to one year ago, the patient feels his mental   health is the same.    Recent Hospitalizations:  He was not admitted to the hospital during the last year.     Current Medical Providers:  Patient Care Team:  Markie Medrano MD as PCP - General (Family Medicine)    Outpatient Medications Prior to Visit   Medication Sig Dispense Refill   • cetirizine (RA Allergy Relief) 10 MG tablet Take 1 tablet by mouth Daily. 90 tablet 3   • cholecalciferol (Vitamin D) 25 MCG (1000 UT) tablet Take 1 tablet by mouth Daily. 90 tablet 3   • cyanocobalamin (VITAMIN B-12) 1000 MCG tablet Take 1 tablet by mouth Daily. 90 tablet 3   •  Fluticasone-Umeclidin-Vilant (TRELEGY) 100-62.5-25 MCG/INH inhaler Inhale 1 puff Every Morning. 180 each 3   • gabapentin (NEURONTIN) 600 MG tablet TAKE ONE (1) TABLET BY MOUTH DAILY. 90 tablet 0   • hydrOXYzine pamoate (VISTARIL) 50 MG capsule 1 po bid and 2 po qhs 360 capsule 3   • lisinopril (PRINIVIL,ZESTRIL) 10 MG tablet Take 1 tablet by mouth Daily. 90 tablet 3   • LORazepam (ATIVAN) 1 MG tablet TAKE ONE (1) TABLET BY MOUTH EVERY NIGHT. 90 tablet 0   • metFORMIN (GLUCOPHAGE) 500 MG tablet Take 1 tablet by mouth 2 (Two) Times a Day With Meals. 180 tablet 3   • montelukast (SINGULAIR) 10 MG tablet Take 1 tablet by mouth Daily. 90 tablet 3   • sildenafil (REVATIO) 20 MG tablet Take 2-5 po qd prn 60 tablet 5   • tamsulosin (FLOMAX) 0.4 MG capsule 24 hr capsule Take 1 capsule by mouth every night at bedtime. 90 capsule 3   • Zoster Vac Recomb Adjuvanted (Shingrix) 50 MCG/0.5ML reconstituted suspension Inject 0.5 mL into the appropriate muscle as directed by prescriber See Admin Instructions. Repeat in 2-6 months 1 each 1   • aspirin 81 MG EC tablet Take 1 tablet by mouth Daily. 90 tablet 3   • pravastatin (PRAVACHOL) 40 MG tablet TAKE ONE (1) TABLET BY MOUTH DAILY. 30 tablet 5     No facility-administered medications prior to visit.     No opioid medication identified on active medication list. I have reviewed chart for other potential  high risk medication/s and harmful drug interactions in the elderly.          Aspirin is on active medication list. Aspirin use is not indicated based on review of current medical condition/s. Risk of harm outweighs potential benefits. Patient instructed to discontinue this medication.  .    Patient Active Problem List   Diagnosis   • Essential hypertension   • Mixed hyperlipidemia   • Type 2 diabetes mellitus with peripheral neuropathy (HCC)   • COPD mixed type (HCC)   • Vitamin B 12 deficiency   • Fatty liver, alcoholic   • Chronic insomnia   • Vasculogenic erectile dysfunction    • Benign non-nodular prostatic hyperplasia with lower urinary tract symptoms   • Chronic seasonal allergic rhinitis   • Healthcare maintenance   • Encounter for immunization   • Former smoker   • Seborrheic keratoses   • Asteototic dermatitis   • Vitamin D deficiency   • Skin lesion of right ear   • Chronic right hip pain   • Skin lesion of face     Advance Care Planning  Advance Directive is not on file.  ACP discussion was held with the patient during this visit. Patient does not have an advance directive, information provided.    Review of Systems   Constitutional: Positive for fatigue. Negative for appetite change, chills and fever.   HENT: Positive for rhinorrhea and sneezing. Negative for congestion, ear pain, postnasal drip, sore throat and voice change.    Eyes: Negative for visual disturbance.   Respiratory: Positive for cough and shortness of breath. Negative for wheezing.    Cardiovascular: Negative for chest pain, palpitations and leg swelling.   Gastrointestinal: Negative for abdominal pain, blood in stool, constipation, diarrhea, nausea and vomiting.   Genitourinary: Positive for difficulty urinating (decreased stream with intermittent sense of incomplete voiding) and frequency (nocturia). Negative for dysuria, hematuria and urgency.   Musculoskeletal: Positive for arthralgias. Negative for back pain, joint swelling, myalgias and neck pain.   Skin: Negative for rash.   Neurological: Positive for numbness (with paresthesias both feet). Negative for weakness.   Psychiatric/Behavioral: Positive for sleep disturbance. Negative for dysphoric mood and suicidal ideas. The patient is not nervous/anxious.         Objective    Vitals:    01/06/23 1002   BP: 126/62   Pulse: 50   Resp: 14   Temp: 98.6 °F (37 °C)   TempSrc: Temporal   SpO2: 96%   Weight: 73.9 kg (163 lb)   Height: 160 cm (63\")     Estimated body mass index is 28.87 kg/m² as calculated from the following:    Height as of this encounter: 160 cm  (63\").    Weight as of this encounter: 73.9 kg (163 lb).    BMI is >= 25 and <30. (Overweight) The following options were offered after discussion;: exercise counseling/recommendations and nutrition counseling/recommendations    Does the patient have evidence of cognitive impairment? No    Physical Exam  Constitutional:       General: He is not in acute distress.     Appearance: Normal appearance. He is well-developed. He is not ill-appearing or diaphoretic.      Comments: Bright and in fair spirits.  Normal gait.  No apparent distress. No pallor, jaundice, diaphoresis, or cyanosis.   HENT:      Head: Atraumatic.      Right Ear: Tympanic membrane, ear canal and external ear normal.      Left Ear: Tympanic membrane, ear canal and external ear normal.      Mouth/Throat:      Lips: No lesions.      Mouth: Mucous membranes are moist. No oral lesions.      Pharynx: No oropharyngeal exudate or posterior oropharyngeal erythema.   Eyes:      Conjunctiva/sclera: Conjunctivae normal.   Neck:      Thyroid: No thyroid mass or thyromegaly.      Vascular: No carotid bruit or JVD.      Trachea: Trachea normal. No tracheal deviation.   Cardiovascular:      Rate and Rhythm: Normal rate and regular rhythm.      Heart sounds: Normal heart sounds, S1 normal and S2 normal. No murmur heard.    No gallop. No S3 or S4 sounds.   Pulmonary:      Effort: Pulmonary effort is normal.      Breath sounds: Examination of the right-lower field reveals decreased breath sounds. Examination of the left-lower field reveals decreased breath sounds. Decreased breath sounds and wheezing (diffuse - mild) present. No rales.      Comments: Pulmonary hyperinflation  Abdominal:      General: Bowel sounds are normal. There is no distension.      Palpations: Abdomen is soft. There is no hepatomegaly, splenomegaly or mass.      Tenderness: There is no abdominal tenderness.      Hernia: No hernia is present.   Musculoskeletal:      Lumbar back: Negative right  straight leg raise test and negative left straight leg raise test.      Right lower leg: No edema.      Left lower leg: No edema.   Lymphadenopathy:      Head:      Right side of head: No submental, submandibular, tonsillar, preauricular, posterior auricular or occipital adenopathy.      Left side of head: No submental, submandibular, tonsillar, preauricular, posterior auricular or occipital adenopathy.      Cervical: No cervical adenopathy.      Upper Body:      Right upper body: No supraclavicular adenopathy.      Left upper body: No supraclavicular adenopathy.   Skin:     General: Skin is warm and dry.      Coloration: Skin is not cyanotic, jaundiced or pale.      Findings: No rash.      Nails: There is no clubbing.   Neurological:      Mental Status: He is alert and oriented to person, place, and time.      Cranial Nerves: No cranial nerve deficit.      Sensory: Sensory deficit (decreased vibration sense both feet) present.      Motor: No tremor.      Coordination: Coordination normal.      Gait: Gait normal.   Psychiatric:         Attention and Perception: Attention normal.         Mood and Affect: Mood normal.         Speech: Speech normal.         Behavior: Behavior normal.         Thought Content: Thought content normal.       HEALTH RISK ASSESSMENT    Smoking Status:  Social History     Tobacco Use   Smoking Status Never   Smokeless Tobacco Current   • Types: Chew     Alcohol Consumption:  Social History     Substance and Sexual Activity   Alcohol Use Yes     Fall Risk Screen:  [facility] Fall Risk Assessment was completed, and patient is at MODERATE risk for falls.Assessment completed on:1/6/2023    Depression Screening:  PHQ-2/PHQ-9 Depression Screening 1/6/2023   Retired PHQ-9 Total Score -   Retired Total Score -   Little Interest or Pleasure in Doing Things 0-->not at all   Feeling Down, Depressed or Hopeless 0-->not at all   PHQ-9: Brief Depression Severity Measure Score 0     Health Habits and Functional  and Cognitive Screening:  Functional & Cognitive Status 1/6/2023   Do you have difficulty preparing food and eating? No   Do you have difficulty bathing yourself, getting dressed or grooming yourself? No   Do you have difficulty using the toilet? No   Do you have difficulty moving around from place to place? No   Do you have trouble with steps or getting out of a bed or a chair? No   Current Diet Well Balanced Diet   Dental Exam Not up to date   Eye Exam Not up to date   Exercise (times per week) 0 times per week   Current Exercises Include No Regular Exercise   Current Exercise Activities Include -   Do you need help using the phone?  No   Are you deaf or do you have serious difficulty hearing?  No   Do you need help with transportation? No   Do you need help shopping? No   Do you need help preparing meals?  No   Do you need help with housework?  No   Do you need help with laundry? No   Do you need help taking your medications? No   Do you need help managing money? No   Do you ever drive or ride in a car without wearing a seat belt? No   Have you felt unusual stress, anger or loneliness in the last month? No   Who do you live with? Other   If you need help, do you have trouble finding someone available to you? No   Have you been bothered in the last four weeks by sexual problems? No   Do you have difficulty concentrating, remembering or making decisions? No     Age-appropriate Screening Schedule:  Refer to the list below for future screening recommendations based on patient's age, sex and/or medical conditions. Orders for these recommended tests are listed in the plan section. The patient has been provided with a written plan.    Health Maintenance   Topic Date Due   • DXA SCAN  Never done   • TDAP/TD VACCINES (1 - Tdap) Never done   • ZOSTER VACCINE (1 of 2) Never done   • DIABETIC EYE EXAM  02/23/2023   • HEMOGLOBIN A1C  07/06/2023   • LIPID PANEL  01/06/2024   • URINE MICROALBUMIN  01/06/2024   • INFLUENZA  VACCINE  Completed          Assessment & Plan   CMS Preventative Services Quick Reference  Risk Factors Identified During Encounter  Alcohol Misuse: Patient encouraged to limit alcohol use to no more than 1 standard alcoholic beverage per day. (12 ounce beer, 6 ounce wine, one shot liquor)  Chronic Pain: Natural history and expected course discussed. Questions answered.  Fall Risk-High or Moderate: Discussed Fall Prevention in the home  Immunizations Discussed/Encouraged: Tdap, Shingrix and COVID19  The above risks/problems have been discussed with the patient.  Follow up actions/plans if indicated are seen below in the Assessment/Plan Section.  Pertinent information has been shared with the patient in the After Visit Summary.    Diagnoses and all orders for this visit:    1. Chronic seasonal allergic rhinitis  Continue current medication  Encouraged to report if any worse or if any new symptoms or concerns.    2. Mixed hyperlipidemia  Encouraged to continue to work on his diet and exercise plan.  Continue current medication  -     Comprehensive Metabolic Panel  -     Lipid Panel  -     TSH    3. Essential hypertension   Hypertension: at goal. Evidence of target organ damage: none.   As above  Continue current medication  -     Comprehensive Metabolic Panel  -     TSH    4. Vitamin D deficiency  Continue supplementation with monitoring.  -     Vitamin D,25-Hydroxy    5. Vitamin B 12 deficiency  As above.  -     CBC & Differential  -     Vitamin B12  -     Folate RBC    6. Type 2 diabetes mellitus with peripheral neuropathy (HCC)  Diabetes mellitus Type II, under excellent control.   Encouraged to continue to pursue ADA diet  Encouraged aerobic exercise.  Continue current medication  Updated labs drawn.  -     Comprehensive Metabolic Panel  -     TSH  -     Hemoglobin A1c  -     Vitamin B12  -     MicroAlbumin, Urine, Random - Urine, Random Void    7. Fatty liver, alcoholic  -     CBC & Differential  -      Comprehensive Metabolic Panel    8. Erectile dysfunction due to arterial insufficiency    9. Benign non-nodular prostatic hyperplasia with lower urinary tract symptoms    10. Healthcare maintenance  Advised to discontinue ASA  Patient has already received a flu shot fall.  Recommended an updated bivalent COVID-19 vaccination.  Reminded that he is due for Shingrix and an updated Tdap  -     Tdap (BOOSTRIX) 5-2.5-18.5 LF-MCG/0.5 injection; Inject 0.5 mL into the appropriate muscle as directed by prescriber 1 (One) Time for 1 dose.  Dispense: 1 each; Refill: 0    11. COPD mixed type (HCC)   COPD is stable.  Encouraged to remain as active as symptoms allow for  Continue current medication  -     CBC & Differential    12. Chronic insomnia  -     TSH    13. Former smoker    14.  Alcohol use disorder with dependence  Supportive therapy  Reviewed options with respect to cessation.  Patient uninterested in pursuing anything at present but will consider and will let us know if he should change his mind    Follow Up:   Return in about 4 months (around 5/6/2023).     An After Visit Summary and PPPS were made available to the patient.

## 2023-01-07 VITALS
HEART RATE: 50 BPM | BODY MASS INDEX: 28.88 KG/M2 | TEMPERATURE: 98.6 F | SYSTOLIC BLOOD PRESSURE: 126 MMHG | DIASTOLIC BLOOD PRESSURE: 62 MMHG | RESPIRATION RATE: 14 BRPM | HEIGHT: 63 IN | WEIGHT: 163 LBS | OXYGEN SATURATION: 96 %

## 2023-01-07 PROBLEM — F10.20 ALCOHOL USE DISORDER, MODERATE, DEPENDENCE (HCC): Status: ACTIVE | Noted: 2023-01-07

## 2023-01-07 LAB
25(OH)D3 SERPL-MCNC: 37.4 NG/ML (ref 30–100)
ALBUMIN SERPL-MCNC: 4.4 G/DL (ref 3.5–5.2)
ALBUMIN UR-MCNC: 7.5 MG/DL
ALBUMIN/GLOB SERPL: 1.9 G/DL
ALP SERPL-CCNC: 50 U/L (ref 39–117)
ALT SERPL W P-5'-P-CCNC: 27 U/L (ref 1–41)
ANION GAP SERPL CALCULATED.3IONS-SCNC: 13.6 MMOL/L (ref 5–15)
AST SERPL-CCNC: 28 U/L (ref 1–40)
BASOPHILS # BLD AUTO: 0.04 10*3/MM3 (ref 0–0.2)
BASOPHILS NFR BLD AUTO: 0.6 % (ref 0–1.5)
BILIRUB SERPL-MCNC: 0.4 MG/DL (ref 0–1.2)
BUN SERPL-MCNC: 12 MG/DL (ref 8–23)
BUN/CREAT SERPL: 11.8 (ref 7–25)
CALCIUM SPEC-SCNC: 9.9 MG/DL (ref 8.6–10.5)
CHLORIDE SERPL-SCNC: 100 MMOL/L (ref 98–107)
CHOLEST SERPL-MCNC: 174 MG/DL (ref 0–200)
CO2 SERPL-SCNC: 26.4 MMOL/L (ref 22–29)
CREAT SERPL-MCNC: 1.02 MG/DL (ref 0.76–1.27)
DEPRECATED RDW RBC AUTO: 43.5 FL (ref 37–54)
EGFRCR SERPLBLD CKD-EPI 2021: 74.3 ML/MIN/1.73
EOSINOPHIL # BLD AUTO: 0.47 10*3/MM3 (ref 0–0.4)
EOSINOPHIL NFR BLD AUTO: 6.5 % (ref 0.3–6.2)
ERYTHROCYTE [DISTWIDTH] IN BLOOD BY AUTOMATED COUNT: 13.7 % (ref 12.3–15.4)
GLOBULIN UR ELPH-MCNC: 2.3 GM/DL
GLUCOSE SERPL-MCNC: 131 MG/DL (ref 65–99)
HBA1C MFR BLD: 6.7 % (ref 4.8–5.6)
HCT VFR BLD AUTO: 44.1 % (ref 37.5–51)
HDLC SERPL-MCNC: 46 MG/DL (ref 40–60)
HGB BLD-MCNC: 15 G/DL (ref 13–17.7)
IMM GRANULOCYTES # BLD AUTO: 0.03 10*3/MM3 (ref 0–0.05)
IMM GRANULOCYTES NFR BLD AUTO: 0.4 % (ref 0–0.5)
LDLC SERPL CALC-MCNC: 85 MG/DL (ref 0–100)
LDLC/HDLC SERPL: 1.66 {RATIO}
LYMPHOCYTES # BLD AUTO: 1.99 10*3/MM3 (ref 0.7–3.1)
LYMPHOCYTES NFR BLD AUTO: 27.5 % (ref 19.6–45.3)
MCH RBC QN AUTO: 30.1 PG (ref 26.6–33)
MCHC RBC AUTO-ENTMCNC: 34 G/DL (ref 31.5–35.7)
MCV RBC AUTO: 88.4 FL (ref 79–97)
MONOCYTES # BLD AUTO: 0.6 10*3/MM3 (ref 0.1–0.9)
MONOCYTES NFR BLD AUTO: 8.3 % (ref 5–12)
NEUTROPHILS NFR BLD AUTO: 4.11 10*3/MM3 (ref 1.7–7)
NEUTROPHILS NFR BLD AUTO: 56.7 % (ref 42.7–76)
NRBC BLD AUTO-RTO: 0 /100 WBC (ref 0–0.2)
PLATELET # BLD AUTO: 203 10*3/MM3 (ref 140–450)
PMV BLD AUTO: 10.1 FL (ref 6–12)
POTASSIUM SERPL-SCNC: 4.8 MMOL/L (ref 3.5–5.2)
PROT SERPL-MCNC: 6.7 G/DL (ref 6–8.5)
RBC # BLD AUTO: 4.99 10*6/MM3 (ref 4.14–5.8)
SODIUM SERPL-SCNC: 140 MMOL/L (ref 136–145)
TRIGL SERPL-MCNC: 259 MG/DL (ref 0–150)
TSH SERPL DL<=0.05 MIU/L-ACNC: 2.67 UIU/ML (ref 0.27–4.2)
VIT B12 BLD-MCNC: 1191 PG/ML (ref 211–946)
VLDLC SERPL-MCNC: 43 MG/DL (ref 5–40)
WBC NRBC COR # BLD: 7.24 10*3/MM3 (ref 3.4–10.8)

## 2023-01-07 RX ORDER — PRAVASTATIN SODIUM 40 MG
40 TABLET ORAL DAILY
Qty: 90 TABLET | Refills: 3 | Status: SHIPPED | OUTPATIENT
Start: 2023-01-07

## 2023-01-08 LAB
FOLATE BLD-MCNC: 516 NG/ML
FOLATE RBC-MCNC: 1178 NG/ML
HCT VFR BLD AUTO: 43.8 % (ref 37.5–51)

## 2023-02-28 NOTE — ASSESSMENT & PLAN NOTE
Right EAC  impaction removed today in the office.  Unable to removed left impaction. patient will use softening drops x1 week nightly And return to the clinic in 1 week for possible removal  
No

## 2023-03-08 DIAGNOSIS — F51.04 CHRONIC INSOMNIA: ICD-10-CM

## 2023-03-08 DIAGNOSIS — E11.42 TYPE 2 DIABETES MELLITUS WITH PERIPHERAL NEUROPATHY: ICD-10-CM

## 2023-03-08 RX ORDER — GABAPENTIN 600 MG/1
TABLET ORAL
Qty: 90 TABLET | Refills: 0 | Status: SHIPPED | OUTPATIENT
Start: 2023-03-08

## 2023-03-08 RX ORDER — LORAZEPAM 1 MG/1
TABLET ORAL
Qty: 90 TABLET | Refills: 0 | Status: SHIPPED | OUTPATIENT
Start: 2023-03-08

## 2023-05-05 ENCOUNTER — OFFICE VISIT (OUTPATIENT)
Dept: FAMILY MEDICINE CLINIC | Facility: CLINIC | Age: 83
End: 2023-05-05
Payer: MEDICARE

## 2023-05-05 DIAGNOSIS — Z87.891 FORMER SMOKER: ICD-10-CM

## 2023-05-05 DIAGNOSIS — N52.01 ERECTILE DYSFUNCTION DUE TO ARTERIAL INSUFFICIENCY: ICD-10-CM

## 2023-05-05 DIAGNOSIS — F10.20 ALCOHOL USE DISORDER, MODERATE, DEPENDENCE: Primary | ICD-10-CM

## 2023-05-05 DIAGNOSIS — J30.2 CHRONIC SEASONAL ALLERGIC RHINITIS: ICD-10-CM

## 2023-05-05 DIAGNOSIS — F51.04 CHRONIC INSOMNIA: ICD-10-CM

## 2023-05-05 DIAGNOSIS — Z00.00 HEALTHCARE MAINTENANCE: ICD-10-CM

## 2023-05-05 DIAGNOSIS — E53.8 VITAMIN B 12 DEFICIENCY: ICD-10-CM

## 2023-05-05 DIAGNOSIS — K70.0 FATTY LIVER, ALCOHOLIC: ICD-10-CM

## 2023-05-05 DIAGNOSIS — M25.50 CHRONIC JOINT PAIN: ICD-10-CM

## 2023-05-05 DIAGNOSIS — G89.29 CHRONIC JOINT PAIN: ICD-10-CM

## 2023-05-05 DIAGNOSIS — E55.9 VITAMIN D DEFICIENCY: ICD-10-CM

## 2023-05-05 DIAGNOSIS — E78.49 OTHER HYPERLIPIDEMIA: ICD-10-CM

## 2023-05-05 DIAGNOSIS — N40.1 BENIGN NON-NODULAR PROSTATIC HYPERPLASIA WITH LOWER URINARY TRACT SYMPTOMS: ICD-10-CM

## 2023-05-05 DIAGNOSIS — I10 ESSENTIAL HYPERTENSION: ICD-10-CM

## 2023-05-05 DIAGNOSIS — E78.2 MIXED HYPERLIPIDEMIA: ICD-10-CM

## 2023-05-05 DIAGNOSIS — E11.42 TYPE 2 DIABETES MELLITUS WITH PERIPHERAL NEUROPATHY: ICD-10-CM

## 2023-05-05 DIAGNOSIS — J30.9 CHRONIC ALLERGIC RHINITIS: ICD-10-CM

## 2023-05-05 DIAGNOSIS — J44.9 COPD MIXED TYPE: ICD-10-CM

## 2023-05-05 NOTE — PROGRESS NOTES
Subjective   Joselito Merida is a 82 y.o. male.     Chief Complaint  He returns for a scheduled reassessment of multiple medical problems including type 2 diabetes mellitus, hyperlipidemia, essential hypertension, chronic allergic rhinitis, chronic obstructive pulmonary disease, and chronic joint pain    History of Present Illness     Type 2 Diabetes Mellitus  He admits to paresthesias of the feet.  He continues to deny any visual disturbances, polydipsia, polyuria, hypoglycemia or foot ulcerations.  He remains on metformin with no apparent side effects.  He is taking gabapentin for his paresthesias and feels it helps considerably.    Lab Results   Component Value Date    HGBA1C 6.70 (H) 01/06/2023     Lab Results   Component Value Date    MICROALBUR 7.5 01/06/2023     Lab Results   Component Value Date    YPXAYCBE31 1,191 (H) 01/06/2023     Hyperlipidemia  His compliance with treatment has been fair.  He is fairly active about the house and yard.  He remains on pravastatin with no apparent side effects  Lab Results   Component Value Date    CHOL 174 01/06/2023    CHLPL 150 11/16/2021    TRIG 259 (H) 01/06/2023    HDL 46 01/06/2023    LDL 85 01/06/2023     Essential Hypertension  He admits to shortness of breath with above-average activity.  He does not feel this has changed and continues to deny any chest pain, palpitations, orthopnea, paroxysmal nocturnal dyspnea or peripheral edema.  He remains on lisinopril.   Lab Results   Component Value Date    GLUCOSE 131 (H) 01/06/2023    BUN 12 01/06/2023    CREATININE 1.02 01/06/2023    EGFR 74.3 01/06/2023    BCR 11.8 01/06/2023    K 4.8 01/06/2023    CO2 26.4 01/06/2023    CALCIUM 9.9 01/06/2023    PROTENTOTREF 6.7 11/16/2021    ALBUMIN 4.4 01/06/2023    BILITOT 0.4 01/06/2023    AST 28 01/06/2023    ALT 27 01/06/2023     Lab Results   Component Value Date    ALKPHOS 50 01/06/2023     Chronic Allergic Rhinitis  He continues to experience intermittent rhinorrhea, nasal  congestion, and postnasal drip.  He continues to deny any other upper respiratory tract symptoms and there is no history of any fever or chills.  He has tried several of the second-generation antihistamines with little impact on his symptoms.  He remains on montelukast in the morning and diphenhydramine in the evening with fairly good control of his symptoms    COPD  He feels that his cough and shortness of breath remain at baseline. He continues to deny any chest pain, hemoptysis, fever or chills. He states he is able to walk on level ground with no symptoms but continues to have trouble with inclines, stairs, or lifting. He is following the treatment plan, including medications as directed. He has not been using his maintenance inhaler since last here. He has not required a rescue inhaler for some time. He has a smoking history of 40 pack years.    Chronic Joint Pain  He admits to generalized joint pain.  Those areas most affected include the small joints of his hands, his shoulders, his low back, and his hips particularly the right.  The pain is described as a sharp ache with use. There is no history of any stiffness or swelling and he continues to deny any changes in his strength or sensation.  He continues to deny any limitation of his activities or sleep at present.  He is right-hand dominant. Plain films of the right hip performed on 1/27/2022 were unremarkable    Alcohol Abuse Disorder  He continues to drink 2-3 alcoholic beverages most nights (beer and moonshine).  He experiences acute withdrawal if he goes more than a day or 2 without a drink.  He accepts that he is an alcoholic but is not certain that he is ready to quit at present.  He successfully quit for nearly 30 years in the past. Most recent RBC folate level 1178    Labs  Most recent vitamin D 37.4  Lab Results   Component Value Date    TSH 2.670 01/06/2023     The following portions of the patient's history were reviewed and updated as  appropriate: allergies, current medications, past medical history, past social history and problem list.    Review of Systems   Constitutional: Positive for fatigue. Negative for appetite change, chills, fever and unexpected weight change.   HENT: Positive for rhinorrhea and sneezing. Negative for congestion, ear pain, postnasal drip, sinus pressure and sore throat.    Eyes: Negative for visual disturbance.   Respiratory: Positive for cough and shortness of breath. Negative for wheezing.    Cardiovascular: Negative for chest pain, palpitations and leg swelling.   Gastrointestinal: Negative for abdominal pain, blood in stool, constipation, diarrhea, nausea and vomiting.   Endocrine: Negative for polydipsia and polyuria.   Genitourinary: Negative for dysuria, frequency, hematuria and urgency.        Nocturia with decreased stream and occasional sense of incomplete voiding   Musculoskeletal: Positive for arthralgias. Negative for back pain and myalgias.   Skin: Negative for rash.   Neurological: Positive for numbness. Negative for weakness and headaches.   Psychiatric/Behavioral: Positive for sleep disturbance. Negative for dysphoric mood. The patient is not nervous/anxious.      Objective   Physical Exam  Constitutional:       General: He is not in acute distress.     Appearance: Normal appearance. He is well-developed. He is not ill-appearing or diaphoretic.      Comments: Bright and in fair spirits.  Normal gait.  No apparent distress. No pallor, jaundice, diaphoresis, or cyanosis.   HENT:      Head: Atraumatic.      Right Ear: Tympanic membrane, ear canal and external ear normal.      Left Ear: Tympanic membrane, ear canal and external ear normal.      Mouth/Throat:      Lips: No lesions.      Mouth: Mucous membranes are moist. No oral lesions.      Pharynx: No oropharyngeal exudate or posterior oropharyngeal erythema.   Eyes:      Conjunctiva/sclera: Conjunctivae normal.   Neck:      Thyroid: No thyroid mass or  thyromegaly.      Vascular: No carotid bruit or JVD.      Trachea: Trachea normal. No tracheal deviation.   Cardiovascular:      Rate and Rhythm: Normal rate and regular rhythm.      Heart sounds: Normal heart sounds, S1 normal and S2 normal. No murmur heard.    No gallop. No S3 or S4 sounds.   Pulmonary:      Effort: Pulmonary effort is normal.      Breath sounds: Examination of the right-lower field reveals decreased breath sounds. Examination of the left-lower field reveals decreased breath sounds. Decreased breath sounds and wheezing (diffuse - mild) present. No rales.      Comments: Pulmonary hyperinflation  Abdominal:      General: Bowel sounds are normal. There is no distension.   Musculoskeletal:      Lumbar back: Negative right straight leg raise test and negative left straight leg raise test.      Right lower leg: No edema.      Left lower leg: No edema.   Lymphadenopathy:      Head:      Right side of head: No submental, submandibular, tonsillar, preauricular, posterior auricular or occipital adenopathy.      Left side of head: No submental, submandibular, tonsillar, preauricular, posterior auricular or occipital adenopathy.      Cervical: No cervical adenopathy.      Upper Body:      Right upper body: No supraclavicular adenopathy.      Left upper body: No supraclavicular adenopathy.   Skin:     General: Skin is warm and dry.      Coloration: Skin is not cyanotic, jaundiced or pale.      Findings: No rash.      Nails: There is no clubbing.   Neurological:      Mental Status: He is alert and oriented to person, place, and time.      Cranial Nerves: No cranial nerve deficit.      Sensory: Sensory deficit (decreased vibration sense both feet) present.      Motor: No tremor.      Coordination: Coordination normal.      Gait: Gait normal.   Psychiatric:         Attention and Perception: Attention normal.         Mood and Affect: Mood normal.         Speech: Speech normal.         Behavior: Behavior normal.          Thought Content: Thought content normal.       Assessment & Plan   Problems Addressed this Visit        Allergies and Adverse Reactions    Chronic seasonal allergic rhinitis  Continue current medication  Encouraged to report if any worse or if any new symptoms or concerns.    Relevant Medications    montelukast (SINGULAIR) 10 MG tablet    hydrOXYzine pamoate (VISTARIL) 50 MG capsule    cetirizine (RA Allergy Relief) 10 MG tablet       Cardiac and Vasculature    Essential hypertension   Hypertension: at goal. Evidence of target organ damage: none.  Encouraged to continue to work on diet and exercise plan.   Continue current medication    Relevant Medications    lisinopril (PRINIVIL,ZESTRIL) 10 MG tablet    Mixed hyperlipidemia  As above.   Continue current medication.    Relevant Medications    pravastatin (PRAVACHOL) 40 MG tablet       Endocrine and Metabolic    Type 2 diabetes mellitus with peripheral neuropathy  Diabetes mellitus Type II, under excellent control.   Encouraged to continue to pursue ADA diet  Encouraged aerobic exercise.  Continue current medication  Updated labs will be drawn at his return.    Relevant Medications    metFORMIN (GLUCOPHAGE) 500 MG tablet    Vitamin B 12 deficiency  Continue supplementation with monitoring.    Relevant Medications    cyanocobalamin (VITAMIN B-12) 1000 MCG tablet    Vitamin D deficiency  As above.    Relevant Medications    cholecalciferol (Vitamin D) 25 MCG (1000 UT) tablet       Gastrointestinal Abdominal     Fatty liver, alcoholic  Will continue to monitor       Genitourinary and Reproductive     Benign non-nodular prostatic hyperplasia with lower urinary tract symptoms  Continue current medication    Relevant Medications    tamsulosin (FLOMAX) 0.4 MG capsule 24 hr capsule    Vasculogenic erectile dysfunction    Relevant Medications    sildenafil (REVATIO) 20 MG tablet       Health Encounters    Healthcare maintenance  Reminded that he is due for [employer] and  an updated Tdap    Relevant Medications    Zoster Vac Recomb Adjuvanted (Shingrix) 50 MCG/0.5ML reconstituted suspension       Mental Health    Alcohol use disorder, moderate, dependence   Patient remains uninterested in treatment at present    Relevant Orders    Urine Drug Screen - Urine, Clean Catch       Musculoskeletal and Injuries    Chronic joint pain  Reminded regarding symptomatic treatment.   Continue current medication  Encouraged to report if any worse or if any new symptoms or concerns.       Pulmonary and Pneumonias    COPD mixed type   COPD is stable.  Encouraged to remain off cigarettes  Encouraged to remain as active as symptoms allow for  Continue current medication    Relevant Medications    montelukast (SINGULAIR) 10 MG tablet    Fluticasone-Umeclidin-Vilant (TRELEGY) 100-62.5-25 MCG/ACT inhaler    cetirizine (RA Allergy Relief) 10 MG tablet       Sleep    Chronic insomnia    Relevant Medications    hydrOXYzine pamoate (VISTARIL) 50 MG capsule       Tobacco    Former smoker         Diagnoses       Codes Comments    Alcohol use disorder, moderate, dependence    -  Primary ICD-10-CM: F10.20  ICD-9-CM: 303.90     Chronic seasonal allergic rhinitis     ICD-10-CM: J30.2  ICD-9-CM: 477.8     Essential hypertension     ICD-10-CM: I10  ICD-9-CM: 401.9     Mixed hyperlipidemia     ICD-10-CM: E78.2  ICD-9-CM: 272.2     Type 2 diabetes mellitus with peripheral neuropathy     ICD-10-CM: E11.42  ICD-9-CM: 250.60, 357.2     Vitamin B 12 deficiency     ICD-10-CM: E53.8  ICD-9-CM: 266.2     Vitamin D deficiency     ICD-10-CM: E55.9  ICD-9-CM: 268.9     Fatty liver, alcoholic     ICD-10-CM: K70.0  ICD-9-CM: 571.0     Erectile dysfunction due to arterial insufficiency     ICD-10-CM: N52.01  ICD-9-CM: 607.84     Benign non-nodular prostatic hyperplasia with lower urinary tract symptoms     ICD-10-CM: N40.1  ICD-9-CM: 600.91     Healthcare maintenance     ICD-10-CM: Z00.00  ICD-9-CM: V70.0     Chronic right hip pain      ICD-10-CM: M25.551, G89.29  ICD-9-CM: 719.45, 338.29     COPD mixed type     ICD-10-CM: J44.9  ICD-9-CM: 496     Chronic insomnia     ICD-10-CM: F51.04  ICD-9-CM: 780.52     Former smoker     ICD-10-CM: Z87.891  ICD-9-CM: V15.82     Other hyperlipidemia     ICD-10-CM: E78.49  ICD-9-CM: 272.4     Chronic allergic rhinitis     ICD-10-CM: J30.9  ICD-9-CM: 477.9

## 2023-05-06 VITALS
WEIGHT: 162 LBS | SYSTOLIC BLOOD PRESSURE: 124 MMHG | TEMPERATURE: 98.6 F | HEART RATE: 56 BPM | BODY MASS INDEX: 28.7 KG/M2 | OXYGEN SATURATION: 97 % | HEIGHT: 63 IN | DIASTOLIC BLOOD PRESSURE: 80 MMHG | RESPIRATION RATE: 14 BRPM

## 2023-05-06 PROBLEM — M25.50 CHRONIC JOINT PAIN: Status: ACTIVE | Noted: 2022-01-27

## 2023-05-06 RX ORDER — SILDENAFIL CITRATE 20 MG/1
TABLET ORAL
Qty: 60 TABLET | Refills: 5 | Status: SHIPPED | OUTPATIENT
Start: 2023-05-06

## 2023-05-06 RX ORDER — HYDROXYZINE PAMOATE 50 MG/1
CAPSULE ORAL
Qty: 360 CAPSULE | Refills: 3 | Status: SHIPPED | OUTPATIENT
Start: 2023-05-06

## 2023-05-06 RX ORDER — LISINOPRIL 10 MG/1
10 TABLET ORAL DAILY
Qty: 90 TABLET | Refills: 3 | Status: SHIPPED | OUTPATIENT
Start: 2023-05-06

## 2023-05-06 RX ORDER — MELATONIN
1000 DAILY
Qty: 90 TABLET | Refills: 3 | Status: SHIPPED | OUTPATIENT
Start: 2023-05-06

## 2023-05-06 RX ORDER — TAMSULOSIN HYDROCHLORIDE 0.4 MG/1
1 CAPSULE ORAL
Qty: 90 CAPSULE | Refills: 3 | Status: SHIPPED | OUTPATIENT
Start: 2023-05-06

## 2023-05-06 RX ORDER — PRAVASTATIN SODIUM 40 MG
40 TABLET ORAL DAILY
Qty: 90 TABLET | Refills: 3 | Status: SHIPPED | OUTPATIENT
Start: 2023-05-06

## 2023-05-06 RX ORDER — MONTELUKAST SODIUM 10 MG/1
10 TABLET ORAL DAILY
Qty: 90 TABLET | Refills: 3 | Status: SHIPPED | OUTPATIENT
Start: 2023-05-06

## 2023-05-06 RX ORDER — CETIRIZINE HYDROCHLORIDE 10 MG/1
10 TABLET ORAL DAILY
Qty: 90 TABLET | Refills: 3 | Status: SHIPPED | OUTPATIENT
Start: 2023-05-06

## 2023-06-07 DIAGNOSIS — E11.42 TYPE 2 DIABETES MELLITUS WITH PERIPHERAL NEUROPATHY: ICD-10-CM

## 2023-06-07 DIAGNOSIS — F51.04 CHRONIC INSOMNIA: ICD-10-CM

## 2023-06-07 RX ORDER — LORAZEPAM 1 MG/1
TABLET ORAL
Qty: 90 TABLET | Refills: 0 | Status: SHIPPED | OUTPATIENT
Start: 2023-06-07

## 2023-06-07 RX ORDER — GABAPENTIN 600 MG/1
TABLET ORAL
Qty: 90 TABLET | Refills: 0 | Status: SHIPPED | OUTPATIENT
Start: 2023-06-07

## 2023-09-05 ENCOUNTER — OFFICE VISIT (OUTPATIENT)
Dept: FAMILY MEDICINE CLINIC | Facility: CLINIC | Age: 83
End: 2023-09-05
Payer: MEDICARE

## 2023-09-05 VITALS
SYSTOLIC BLOOD PRESSURE: 142 MMHG | DIASTOLIC BLOOD PRESSURE: 80 MMHG | TEMPERATURE: 98.4 F | HEIGHT: 63 IN | WEIGHT: 158 LBS | OXYGEN SATURATION: 92 % | HEART RATE: 51 BPM | BODY MASS INDEX: 28 KG/M2

## 2023-09-05 DIAGNOSIS — Z79.899 ENCOUNTER FOR LONG-TERM (CURRENT) USE OF HIGH-RISK MEDICATION: Primary | ICD-10-CM

## 2023-09-05 DIAGNOSIS — Z00.00 HEALTH MAINTENANCE EXAMINATION: ICD-10-CM

## 2023-09-05 DIAGNOSIS — E11.42 TYPE 2 DIABETES MELLITUS WITH PERIPHERAL NEUROPATHY: ICD-10-CM

## 2023-09-05 DIAGNOSIS — F51.04 CHRONIC INSOMNIA: ICD-10-CM

## 2023-09-05 DIAGNOSIS — E55.9 VITAMIN D DEFICIENCY: ICD-10-CM

## 2023-09-05 DIAGNOSIS — F10.90 ALCOHOL USE DISORDER: ICD-10-CM

## 2023-09-05 RX ORDER — GABAPENTIN 600 MG/1
600 TABLET ORAL DAILY
Qty: 30 TABLET | Refills: 1 | Status: SHIPPED | OUTPATIENT
Start: 2023-09-05

## 2023-09-05 RX ORDER — LORAZEPAM 1 MG/1
1 TABLET ORAL NIGHTLY PRN
Qty: 30 TABLET | Refills: 1 | Status: SHIPPED | OUTPATIENT
Start: 2023-09-05

## 2023-09-05 RX ORDER — GABAPENTIN 600 MG/1
600 TABLET ORAL DAILY
Qty: 3 TABLET | Refills: 0 | Status: SHIPPED | OUTPATIENT
Start: 2023-09-05 | End: 2023-09-05 | Stop reason: SDUPTHER

## 2023-09-05 RX ORDER — LORAZEPAM 1 MG/1
1 TABLET ORAL NIGHTLY PRN
Qty: 3 TABLET | Refills: 0 | Status: SHIPPED | OUTPATIENT
Start: 2023-09-05 | End: 2023-09-05 | Stop reason: SDUPTHER

## 2023-09-05 NOTE — Clinical Note
I sent 3 tablets of his Lorazepam and Neurontin to his pharmacy until Dr. Medrano is back later this week so that he doesn't miss any doses. Please send him a refill request for these medications.

## 2023-09-05 NOTE — PROGRESS NOTES
Subjective      Chief Complaint  Needs refills    Subjective      History of Present Illness  Joselito Merida is a 82 y.o. male who presents today to [facility] for refills.  Past medical history is significant for HTN, HLD, type 2 diabetes mellitus with peripheral neuropathy, alcohol use disorder, alcoholic fatty liver disease, COPD, and BPH.    Diabetic peripheral neuropathy:  Mr. Merdia reports a chronic history of a pins and needle sensation in both of his feet with known history of type 2 diabetes mellitus.  This is symmetrical.  He is chronically managed on gabapentin 600 mg daily.  He has been tolerating this well without any issues.    Insomnia:  Alcohol use disorder:  Mr. Merida has a chronic history of insomnia which has been managed with lorazepam 1 mg nightly as needed.  This is being prescribed by his PCP here in the office.  He denies any oversedation or sleeping long into the next day after taking it nightly as needed.  He does have a known history of alcohol use disorder.  He reports drinking about a pint of liquor at night to help him sleep and he has done this for some time.  He does report intermittent shaking if he does not drink alcohol.  He is not interested in quitting at this time.      Current Outpatient Medications:     cetirizine (RA Allergy Relief) 10 MG tablet, Take 1 tablet by mouth Daily., Disp: 90 tablet, Rfl: 3    cholecalciferol (Vitamin D) 25 MCG (1000 UT) tablet, Take 1 tablet by mouth Daily., Disp: 90 tablet, Rfl: 3    cyanocobalamin (VITAMIN B-12) 1000 MCG tablet, Take 1 tablet by mouth Daily., Disp: 90 tablet, Rfl: 3    Fluticasone-Umeclidin-Vilant (TRELEGY) 100-62.5-25 MCG/ACT inhaler, Inhale 1 puff Every Morning., Disp: 180 each, Rfl: 3    gabapentin (NEURONTIN) 600 MG tablet, Take 1 tablet by mouth Daily., Disp: 3 tablet, Rfl: 0    hydrOXYzine pamoate (VISTARIL) 50 MG capsule, 1 po bid and 2 po qhs, Disp: 360 capsule, Rfl: 3    lisinopril  "(PRINIVIL,ZESTRIL) 10 MG tablet, Take 1 tablet by mouth Daily., Disp: 90 tablet, Rfl: 3    LORazepam (ATIVAN) 1 MG tablet, Take 1 tablet by mouth At Night As Needed for Anxiety., Disp: 3 tablet, Rfl: 0    metFORMIN (GLUCOPHAGE) 500 MG tablet, Take 1 tablet by mouth 2 (Two) Times a Day With Meals., Disp: 180 tablet, Rfl: 3    montelukast (SINGULAIR) 10 MG tablet, Take 1 tablet by mouth Daily., Disp: 90 tablet, Rfl: 3    pravastatin (PRAVACHOL) 40 MG tablet, Take 1 tablet by mouth Daily., Disp: 90 tablet, Rfl: 3    sildenafil (REVATIO) 20 MG tablet, Take 2-5 po qd prn, Disp: 60 tablet, Rfl: 5    tamsulosin (FLOMAX) 0.4 MG capsule 24 hr capsule, Take 1 capsule by mouth every night at bedtime., Disp: 90 capsule, Rfl: 3    Zoster Vac Recomb Adjuvanted (Shingrix) 50 MCG/0.5ML reconstituted suspension, Inject 0.5 mL into the appropriate muscle as directed by prescriber See Admin Instructions. Repeat in 2-6 months, Disp: 1 each, Rfl: 1      Allergies   Allergen Reactions    No Known Drug Allergy      Objective     Objective   Vital Signs:  Blood Pressure 142/80   Pulse 51   Temperature 98.4 °F (36.9 °C) (Temporal)   Height 160 cm (63\")   Weight 71.7 kg (158 lb)   Oxygen Saturation 92%   Body Mass Index 27.99 kg/m²   Estimated body mass index is 27.99 kg/m² as calculated from the following:    Height as of this encounter: 160 cm (63\").    Weight as of this encounter: 71.7 kg (158 lb).    Past Medical History:   Diagnosis Date    Allergic rhinitis     Benign prostatic hypertrophy     Chronic insomnia     COPD (chronic obstructive pulmonary disease)     Diabetes mellitus     Diabetic neuropathy     Erectile dysfunction     Fatty liver, alcoholic     Hyperlipidemia     Hypertension     Seborrheic keratosis     Vitamin B 12 deficiency      Past Surgical History:   Procedure Laterality Date    CHOLECYSTECTOMY       Social History     Socioeconomic History    Marital status:    Tobacco Use    Smoking status: Never     " Passive exposure: Never    Smokeless tobacco: Current     Types: Chew   Substance and Sexual Activity    Alcohol use: Yes    Drug use: No    Sexual activity: Defer      Physical Exam  Vitals and nursing note reviewed.   Constitutional:       General: He is not in acute distress.     Appearance: He is well-developed. He is not diaphoretic.   HENT:      Head: Normocephalic and atraumatic.   Eyes:      General: No scleral icterus.        Right eye: No discharge.         Left eye: No discharge.      Conjunctiva/sclera: Conjunctivae normal.   Cardiovascular:      Rate and Rhythm: Regular rhythm. Bradycardia present.      Heart sounds: Normal heart sounds. No murmur heard.    No friction rub. No gallop.   Pulmonary:      Effort: Pulmonary effort is normal. No respiratory distress.      Breath sounds: Normal breath sounds. No wheezing or rales.   Chest:      Chest wall: No tenderness.   Musculoskeletal:         General: Normal range of motion.      Cervical back: Normal range of motion and neck supple.      Right lower leg: No edema.      Left lower leg: No edema.   Skin:     General: Skin is warm and dry.      Coloration: Skin is not pale.      Findings: No erythema or rash.   Neurological:      Mental Status: He is alert and oriented to person, place, and time.   Psychiatric:         Behavior: Behavior normal.      Result Review :  The following data was reviewed by: TAWANDA Mckenna on 09/05/2023:  Hemoglobin A1C   Date Value Ref Range Status   01/06/2023 6.70 (H) 4.80 - 5.60 % Final     TSH   Date Value Ref Range Status   01/06/2023 2.670 0.270 - 4.200 uIU/mL Final     HDL Cholesterol   Date Value Ref Range Status   01/06/2023 46 40 - 60 mg/dL Final     LDL Cholesterol    Date Value Ref Range Status   01/06/2023 85 0 - 100 mg/dL Final     LDL Chol Calc (NIH)   Date Value Ref Range Status   11/16/2021 51 0 - 100 mg/dL Final     Triglycerides   Date Value Ref Range Status   01/06/2023 259 (H) 0 - 150 mg/dL Final      Total Cholesterol   Date Value Ref Range Status   11/16/2021 150 0 - 200 mg/dL Final     Comment:     Cholesterol Reference Ranges  (U.S. Department of Health and Human Services ATP III  Classifications)  Desirable          <200 mg/dL  Borderline High    200-239 mg/dL  High Risk          >240 mg/dL  Triglyceride Reference Ranges  (U.S. Department of Health and Human Services ATP III  Classifications)  Normal           <150 mg/dL  Borderline High  150-199 mg/dL  High             200-499 mg/dL  Very High        >500 mg/dL  HDL Reference Ranges  (U.S. Department of Health and Human Services ATP III  Classifcations)  Low     <40 mg/dl (major risk factor for CHD)  High    >60 mg/dl ('negative' risk factor for CHD)  LDL Reference Ranges  (U.S. Department of Health and Human Services ATP III  Classifcations)  Optimal          <100 mg/dL  Near Optimal     100-129 mg/dL  Borderline High  130-159 mg/dL  High             160-189 mg/dL  Very High        >189 mg/dL               Assessment / Plan         Assessment   Diagnoses and all orders for this visit:    1. Encounter for long-term (current) use of high-risk medication (Primary)  2. Chronic insomnia  3. Type 2 diabetes mellitus with peripheral neuropathy  Mr. Merida requests refills of his chronic scheduled medications. His PCP who prescribes these medications for him is out of the office until later this week.   Tanmay report reviewed and listed above. Recent refills appropriate.   Updated UDS obtained today, results pending.   I have sent in 3 days of his gabapentin and lorazepam so that he does not miss any doses.   Will send refills requests to his PCP when he is back in the office later this week.   -     gabapentin (NEURONTIN) 600 MG tablet; Take 1 tablet by mouth Daily.  Dispense: 3 tablet; Refill: 0  -     Urine Drug Screen - Urine, Clean Catch; Future  -     LORazepam (ATIVAN) 1 MG tablet; Take 1 tablet by mouth At Night As Needed for Anxiety.  Dispense: 3  tablet; Refill: 0    4. Alcohol use disorder  Encouraged on cessation. He does not wish to quit for now.   Obtain updated folate and vitamin b12 levels prior to next visit.   -     Folate; Future  -     Vitamin B12; Future    5. Health maintenance examination  6. Vitamin D deficiency  We discussed updating his labs today. He wishes to wait until his next appt with Dr. Medrano. Recommended obtaining labs about 1 week prior so they can be discussed at his follow up visit in 10/2023.  His other chronic medications were sent in with a 1 year supply at his last visit. Continue.   -     CBC & Differential; Future  -     Comprehensive Metabolic Panel; Future  -     Lipid Panel; Future  -     TSH; Future  -     Hemoglobin A1c; Future  -     Vitamin D 25 hydroxy; Future       New Medications Ordered This Visit   Medications    gabapentin (NEURONTIN) 600 MG tablet     Sig: Take 1 tablet by mouth Daily.     Dispense:  3 tablet     Refill:  0     Sending 3 tablets until his PCP is back in the office later this week.    LORazepam (ATIVAN) 1 MG tablet     Sig: Take 1 tablet by mouth At Night As Needed for Anxiety.     Dispense:  3 tablet     Refill:  0     Sending 3 tablets until his PCP is back in the office later this week.     Health Maintenance  Consider Tdap, shingles vaccine series, diabetic eye exam.    Follow Up   Return for Follow up with PCP as scheduled.    Patient was given instructions and counseling regarding his condition or for health maintenance advice. Please see specific information pulled into the AVS if appropriate.       This document has been electronically signed by TAWANDA Mckenna   September 5, 2023 13:51 EDT    Dictated Utilizing Dragon Dictation: Part of this note may be an electronic transcription/translation of spoken language to printed text using the Dragon Dictation System.

## 2023-10-03 ENCOUNTER — FLU SHOT (OUTPATIENT)
Dept: FAMILY MEDICINE CLINIC | Facility: CLINIC | Age: 83
End: 2023-10-03
Payer: MEDICARE

## 2023-10-03 DIAGNOSIS — Z23 NEED FOR INFLUENZA VACCINATION: Primary | ICD-10-CM

## 2023-10-03 PROCEDURE — 90662 IIV NO PRSV INCREASED AG IM: CPT | Performed by: GENERAL PRACTICE

## 2023-10-03 PROCEDURE — 80061 LIPID PANEL: CPT | Performed by: PHYSICIAN ASSISTANT

## 2023-10-03 PROCEDURE — 85025 COMPLETE CBC W/AUTO DIFF WBC: CPT | Performed by: PHYSICIAN ASSISTANT

## 2023-10-03 PROCEDURE — 80053 COMPREHEN METABOLIC PANEL: CPT | Performed by: PHYSICIAN ASSISTANT

## 2023-10-03 PROCEDURE — 82306 VITAMIN D 25 HYDROXY: CPT | Performed by: PHYSICIAN ASSISTANT

## 2023-10-03 PROCEDURE — 83036 HEMOGLOBIN GLYCOSYLATED A1C: CPT | Performed by: PHYSICIAN ASSISTANT

## 2023-10-03 PROCEDURE — 82746 ASSAY OF FOLIC ACID SERUM: CPT | Performed by: PHYSICIAN ASSISTANT

## 2023-10-03 PROCEDURE — 84443 ASSAY THYROID STIM HORMONE: CPT | Performed by: PHYSICIAN ASSISTANT

## 2023-10-03 PROCEDURE — 82607 VITAMIN B-12: CPT | Performed by: PHYSICIAN ASSISTANT

## 2023-10-03 PROCEDURE — G0008 ADMIN INFLUENZA VIRUS VAC: HCPCS | Performed by: GENERAL PRACTICE

## 2023-10-16 ENCOUNTER — OFFICE VISIT (OUTPATIENT)
Dept: FAMILY MEDICINE CLINIC | Facility: CLINIC | Age: 83
End: 2023-10-16
Payer: MEDICARE

## 2023-10-16 VITALS
OXYGEN SATURATION: 98 % | BODY MASS INDEX: 28 KG/M2 | SYSTOLIC BLOOD PRESSURE: 134 MMHG | HEART RATE: 50 BPM | RESPIRATION RATE: 15 BRPM | WEIGHT: 158 LBS | DIASTOLIC BLOOD PRESSURE: 64 MMHG | HEIGHT: 63 IN | TEMPERATURE: 98.6 F

## 2023-10-16 DIAGNOSIS — Z00.00 HEALTHCARE MAINTENANCE: ICD-10-CM

## 2023-10-16 DIAGNOSIS — J30.2 CHRONIC SEASONAL ALLERGIC RHINITIS: Primary | ICD-10-CM

## 2023-10-16 DIAGNOSIS — I10 ESSENTIAL HYPERTENSION: ICD-10-CM

## 2023-10-16 DIAGNOSIS — E11.42 TYPE 2 DIABETES MELLITUS WITH PERIPHERAL NEUROPATHY: ICD-10-CM

## 2023-10-16 DIAGNOSIS — N52.01 ERECTILE DYSFUNCTION DUE TO ARTERIAL INSUFFICIENCY: ICD-10-CM

## 2023-10-16 DIAGNOSIS — L98.9 SKIN LESIONS: ICD-10-CM

## 2023-10-16 DIAGNOSIS — G89.29 CHRONIC JOINT PAIN: ICD-10-CM

## 2023-10-16 DIAGNOSIS — J44.9 COPD MIXED TYPE: ICD-10-CM

## 2023-10-16 DIAGNOSIS — F10.20 ALCOHOL USE DISORDER, MODERATE, DEPENDENCE: ICD-10-CM

## 2023-10-16 DIAGNOSIS — E55.9 VITAMIN D DEFICIENCY: ICD-10-CM

## 2023-10-16 DIAGNOSIS — Z87.891 FORMER SMOKER: ICD-10-CM

## 2023-10-16 DIAGNOSIS — M25.50 CHRONIC JOINT PAIN: ICD-10-CM

## 2023-10-16 DIAGNOSIS — N40.1 BENIGN NON-NODULAR PROSTATIC HYPERPLASIA WITH LOWER URINARY TRACT SYMPTOMS: ICD-10-CM

## 2023-10-16 DIAGNOSIS — E53.8 VITAMIN B 12 DEFICIENCY: ICD-10-CM

## 2023-10-16 DIAGNOSIS — K70.0 FATTY LIVER, ALCOHOLIC: ICD-10-CM

## 2023-10-16 DIAGNOSIS — E78.2 MIXED HYPERLIPIDEMIA: ICD-10-CM

## 2023-10-16 DIAGNOSIS — F51.04 CHRONIC INSOMNIA: ICD-10-CM

## 2023-10-16 PROBLEM — L30.8 ASTEOTOTIC DERMATITIS: Status: RESOLVED | Noted: 2020-02-26 | Resolved: 2023-10-16

## 2023-10-16 PROBLEM — L82.1 SEBORRHEIC KERATOSES: Status: RESOLVED | Noted: 2020-02-26 | Resolved: 2023-10-16

## 2023-10-16 PROCEDURE — 3075F SYST BP GE 130 - 139MM HG: CPT | Performed by: GENERAL PRACTICE

## 2023-10-16 PROCEDURE — 99214 OFFICE O/P EST MOD 30 MIN: CPT | Performed by: GENERAL PRACTICE

## 2023-10-16 PROCEDURE — 17000 DESTRUCT PREMALG LESION: CPT | Performed by: GENERAL PRACTICE

## 2023-10-16 PROCEDURE — 3078F DIAST BP <80 MM HG: CPT | Performed by: GENERAL PRACTICE

## 2023-10-16 RX ORDER — LORAZEPAM 1 MG/1
1 TABLET ORAL NIGHTLY PRN
Qty: 30 TABLET | Refills: 3 | Status: SHIPPED | OUTPATIENT
Start: 2023-10-16

## 2023-10-16 RX ORDER — GABAPENTIN 600 MG/1
600 TABLET ORAL DAILY
Qty: 30 TABLET | Refills: 3 | Status: SHIPPED | OUTPATIENT
Start: 2023-10-16

## 2023-10-16 NOTE — PROGRESS NOTES
Cryotherapy Procedure Note    Pre-operative Diagnosis: Actinic keratoses    Post-operative Diagnosis: Same    Locations: Right lower forehead, left upper posterior lateral neck     Indications: Ablation    Procedure Details   Patient informed of risks (permanent scarring, infection, light or dark discoloration, bleeding, infection, weakness, numbness and recurrence of the lesion) and benefits of the procedure and verbal informed consent obtained.    The areas are treated with liquid nitrogen therapy, frozen until ice ball extended 2 mm beyond lesion, allowed to thaw, and treated again. The patient tolerated procedure well.  The patient was instructed on post-op care, warned that there may be blister formation, redness and pain. Recommend OTC analgesia as needed for pain.    Condition:  Stable    Complications:  None .    Plan:  1. Instructed to keep the area dry and covered for 24-48h and clean thereafter.  2. Warning signs of infection were reviewed.    3. Recommended that the patient use OTC acetaminophen as needed for pain.   4. Return in 1 month if lesions do not resolve entirely.

## 2023-10-16 NOTE — PROGRESS NOTES
Subjective   Joselito Merida is a 83 y.o. male.     Chief Complaint  He returns for a scheduled reassessment of multiple medical problems including type 2 diabetes mellitus, hyperlipidemia, essential hypertension, chronic allergic rhinitis, chronic obstructive lung disease, chronic joint pain, and several skin lesions    History of Present Illness     Skin Lesions  Since last here he has developed lesions over his right lower forehead and left upper posterior lateral neck.  These developed suddenly and have not changed significantly.  To date, there have been no associated symptoms    Type 2 Diabetes Mellitus  He admits to paresthesias of the feet.  He continues to deny any visual disturbances, polydipsia, polyuria, hypoglycemia or foot ulcerations.  He remains on metformin with no apparent side effects.  He is taking gabapentin for his paresthesias and feels it helps considerably.    Lab Results   Component Value Date    HGBA1C 6.50 (H) 10/03/2023     Lab Results   Component Value Date    [id number] 762 10/03/2023     Hyperlipidemia  His compliance with treatment has been fair.  He is fairly active about the house and yard.  He remains on pravastatin with no apparent side effects  Lab Results   Component Value Date    CHOL 132 10/03/2023    CHLPL 150 11/16/2021    TRIG 220 (H) 10/03/2023    HDL 62 (H) 10/03/2023    LDL 36 10/03/2023     Essential Hypertension  He admits to shortness of breath with above-average activity.  He does not feel this has changed and continues to deny any chest pain, palpitations, orthopnea, paroxysmal nocturnal dyspnea or peripheral edema.  He remains on lisinopril.   Lab Results   Component Value Date    GLUCOSE 147 (H) 10/03/2023    BUN 16 10/03/2023    CREATININE 1.11 10/03/2023    EGFR 66.7 10/03/2023    BCR 14.4 10/03/2023    K 4.6 10/03/2023    CO2 27.5 10/03/2023    CALCIUM 9.5 10/03/2023    PROTENTOTREF 6.7 11/16/2021    ALBUMIN 4.2 10/03/2023    BILITOT 0.5 10/03/2023    AST 19  10/03/2023    ALT 14 10/03/2023     Lab Results   Component Value Date    ALKPHOS 46 10/03/2023     Chronic Allergic Rhinitis  He continues to experience intermittent rhinorrhea, nasal congestion, and postnasal drip.  He continues to deny any other upper respiratory tract symptoms and there is no history of any fever or chills.  He has tried several of the second-generation antihistamines with little impact on his symptoms.  He remains on montelukast in the morning and diphenhydramine in the evening with fairly good control of his symptoms    COPD  He feels that his cough and shortness of breath remain at baseline. He continues to deny any chest pain, hemoptysis, fever or chills. He states he is able to walk on level ground with no symptoms but continues to have trouble with inclines, stairs, or lifting. He is following the treatment plan, including medications as directed. He is on no medication for this at present. He has a smoking history of 40 pack years.    Chronic Joint Pain  He admits to generalized joint pain.  Those most affected include the small joints of his hands, his shoulders, his low back, and his hips particularly the right.  The pain is described as a sharp ache with use. There is no history of any stiffness or swelling and he continues to deny any changes in his strength or sensation.  He continues to deny any limitation of his activities or sleep at present.  He is right-hand dominant. Plain films of the right hip performed on 1/27/2022 were unremarkable    Alcohol Abuse Disorder  He is currently drinking 1-2 alcoholic beverages most nights (beer and moonshine).  He has generally experienced withdrawal if he goes more than a day or two without a drink.  He accepts that he is an alcoholic but is not certain that he is ready to quit at present.  He successfully quit for nearly 30 years in the past.   Lab Results   Component Value Date    FOLATE 10.60 10/03/2023     Labs  Most recent vitamin D  43.7  Lab Results   Component Value Date    WBC 6.60 10/03/2023    HGB 15.7 10/03/2023    HCT 45.0 10/03/2023    MCV 90.2 10/03/2023     10/03/2023     Lab Results   Component Value Date    TSH 2.850 10/03/2023     The following portions of the patient's history were reviewed and updated as appropriate: allergies, current medications, past medical history, past social history, and problem list.    Review of Systems   Constitutional:  Positive for fatigue. Negative for appetite change, chills, fever and unexpected weight change.   HENT:  Positive for rhinorrhea and sneezing. Negative for congestion, ear pain, postnasal drip, sinus pressure and sore throat.    Eyes:  Negative for visual disturbance.   Respiratory:  Positive for cough and shortness of breath. Negative for wheezing.    Cardiovascular:  Negative for chest pain, palpitations and leg swelling.   Gastrointestinal:  Negative for abdominal pain, blood in stool, constipation, diarrhea, nausea and vomiting.   Endocrine: Negative for polydipsia and polyuria.   Genitourinary:  Negative for dysuria, frequency, hematuria and urgency.        Nocturia with decreased stream and occasional sense of incomplete voiding   Musculoskeletal:  Positive for arthralgias. Negative for back pain and myalgias.   Skin:  Negative for rash.        Lesions   Neurological:  Positive for numbness. Negative for weakness and headaches.   Psychiatric/Behavioral:  Positive for sleep disturbance. Negative for dysphoric mood. The patient is not nervous/anxious.      Objective   Physical Exam  Constitutional:       General: He is not in acute distress.     Appearance: Normal appearance. He is well-developed. He is not ill-appearing or diaphoretic.      Comments: Bright and in fair spirits.  Normal gait.  No apparent distress. No pallor, jaundice, diaphoresis, or cyanosis.   HENT:      Head: Atraumatic.      Right Ear: Tympanic membrane, ear canal and external ear normal.      Left Ear:  Tympanic membrane, ear canal and external ear normal.      Mouth/Throat:      Lips: No lesions.      Mouth: Mucous membranes are moist. No oral lesions.      Pharynx: No oropharyngeal exudate or posterior oropharyngeal erythema.   Eyes:      Conjunctiva/sclera: Conjunctivae normal.   Neck:      Thyroid: No thyroid mass or thyromegaly.      Vascular: No carotid bruit or JVD.      Trachea: Trachea normal. No tracheal deviation.   Cardiovascular:      Rate and Rhythm: Normal rate and regular rhythm.      Heart sounds: Normal heart sounds, S1 normal and S2 normal. No murmur heard.     No gallop. No S3 or S4 sounds.   Pulmonary:      Effort: Pulmonary effort is normal.      Breath sounds: Examination of the right-lower field reveals decreased breath sounds. Examination of the left-lower field reveals decreased breath sounds. Decreased breath sounds and wheezing (diffuse - mild) present. No rales.      Comments: Pulmonary hyperinflation  Abdominal:      General: Bowel sounds are normal. There is no distension.   Musculoskeletal:      Lumbar back: Negative right straight leg raise test and negative left straight leg raise test.      Right lower leg: No edema.      Left lower leg: No edema.   Lymphadenopathy:      Head:      Right side of head: No submental, submandibular, tonsillar, preauricular, posterior auricular or occipital adenopathy.      Left side of head: No submental, submandibular, tonsillar, preauricular, posterior auricular or occipital adenopathy.      Cervical: No cervical adenopathy.      Upper Body:      Right upper body: No supraclavicular adenopathy.      Left upper body: No supraclavicular adenopathy.   Skin:     General: Skin is warm and dry.      Coloration: Skin is not cyanotic, jaundiced or pale.      Findings: Lesion (3-4 mm well demarcated oval slightly erythematous papule with slightly scaly surface right lower forehead -similar 7 to 8 mm circular lesion behind left ear) present. No rash.       Nails: There is no clubbing.   Neurological:      Mental Status: He is alert and oriented to person, place, and time.      Cranial Nerves: No cranial nerve deficit.      Sensory: Sensory deficit (decreased vibration sense both feet) present.      Motor: No tremor.      Coordination: Coordination normal.      Gait: Gait normal.   Psychiatric:         Attention and Perception: Attention normal.         Mood and Affect: Mood normal.         Speech: Speech normal.         Behavior: Behavior normal.         Thought Content: Thought content normal.       Assessment & Plan   Problems Addressed this Visit          Allergies and Adverse Reactions    Chronic seasonal allergic rhinitis        Cardiac and Vasculature    Essential hypertension   Hypertension: at goal. Evidence of target organ damage: none.  Encouraged to continue to work on diet and exercise plan.   Continue current medication    Mixed hyperlipidemia  As above.   Continue current medication.       Endocrine and Metabolic    Type 2 diabetes mellitus with peripheral neuropathy  Diabetes mellitus Type II, under excellent control.   Encouraged to continue to pursue ADA diet  Encouraged aerobic exercise.  Continue current medication    Relevant Medications    gabapentin (NEURONTIN) 600 MG tablet    Vitamin B 12 deficiency  Continue supplementation with monitoring.    Vitamin D deficiency  As above.       Gastrointestinal Abdominal     Fatty liver, alcoholic       Genitourinary and Reproductive     Benign non-nodular prostatic hyperplasia with lower urinary tract symptoms    Vasculogenic erectile dysfunction       Health Encounters    Healthcare maintenance  Patient has already received a flu shot fall.  Recommended an updated COVID-19 vaccination.  Reviewed the potential benefits of RSV vaccination.  Reminded that he is due for Shingrix and an updated Tdap       Mental Health    Alcohol use disorder, moderate, dependence       Musculoskeletal and Injuries    Chronic  joint pain       Pulmonary and Pneumonias    COPD mixed type   COPD is improving with smoking cessation.  Encouraged to remain as active as symptoms allow for       Skin    Skin lesions  Reviewed treatment options and agreed on a trial of cryotherapy. See procedure note.       Sleep    Chronic insomnia    Relevant Medications    LORazepam (ATIVAN) 1 MG tablet       Tobacco    Former smoker     Diagnoses         Codes Comments    Chronic seasonal allergic rhinitis    -  Primary ICD-10-CM: J30.2  ICD-9-CM: 477.8     Essential hypertension     ICD-10-CM: I10  ICD-9-CM: 401.9     Mixed hyperlipidemia     ICD-10-CM: E78.2  ICD-9-CM: 272.2     Type 2 diabetes mellitus with peripheral neuropathy     ICD-10-CM: E11.42  ICD-9-CM: 250.60, 357.2     Vitamin B 12 deficiency     ICD-10-CM: E53.8  ICD-9-CM: 266.2     Vitamin D deficiency     ICD-10-CM: E55.9  ICD-9-CM: 268.9     Fatty liver, alcoholic     ICD-10-CM: K70.0  ICD-9-CM: 571.0     Benign non-nodular prostatic hyperplasia with lower urinary tract symptoms     ICD-10-CM: N40.1  ICD-9-CM: 600.91     Erectile dysfunction due to arterial insufficiency     ICD-10-CM: N52.01  ICD-9-CM: 607.84     Healthcare maintenance     ICD-10-CM: Z00.00  ICD-9-CM: V70.0     Alcohol use disorder, moderate, dependence     ICD-10-CM: F10.20  ICD-9-CM: 303.90     Chronic joint pain     ICD-10-CM: M25.50, G89.29  ICD-9-CM: 719.40, 338.29     COPD mixed type     ICD-10-CM: J44.9  ICD-9-CM: 496     Chronic insomnia     ICD-10-CM: F51.04  ICD-9-CM: 780.52     Former smoker     ICD-10-CM: Z87.891  ICD-9-CM: V15.82     Skin lesions     ICD-10-CM: L98.9  ICD-9-CM: 709.9

## 2024-03-04 DIAGNOSIS — J30.9 CHRONIC ALLERGIC RHINITIS: ICD-10-CM

## 2024-03-05 RX ORDER — CETIRIZINE HYDROCHLORIDE 10 MG/1
TABLET ORAL
Qty: 90 TABLET | Refills: 3 | Status: SHIPPED | OUTPATIENT
Start: 2024-03-05

## 2024-07-01 DIAGNOSIS — E11.42 TYPE 2 DIABETES MELLITUS WITH PERIPHERAL NEUROPATHY: ICD-10-CM

## 2025-04-01 DIAGNOSIS — J30.9 CHRONIC ALLERGIC RHINITIS: ICD-10-CM

## 2025-04-01 RX ORDER — CETIRIZINE HYDROCHLORIDE 10 MG/1
10 TABLET ORAL DAILY
Qty: 90 TABLET | Refills: 3 | OUTPATIENT
Start: 2025-04-01